# Patient Record
Sex: FEMALE | Race: WHITE | NOT HISPANIC OR LATINO | Employment: UNEMPLOYED | URBAN - METROPOLITAN AREA
[De-identification: names, ages, dates, MRNs, and addresses within clinical notes are randomized per-mention and may not be internally consistent; named-entity substitution may affect disease eponyms.]

---

## 2019-08-13 ENCOUNTER — HOSPITAL ENCOUNTER (EMERGENCY)
Facility: HOSPITAL | Age: 34
Discharge: HOME | End: 2019-08-13
Attending: EMERGENCY MEDICINE | Admitting: EMERGENCY MEDICINE
Payer: COMMERCIAL

## 2019-08-13 ENCOUNTER — APPOINTMENT (EMERGENCY)
Dept: RADIOLOGY | Facility: HOSPITAL | Age: 34
End: 2019-08-13
Payer: COMMERCIAL

## 2019-08-13 VITALS
DIASTOLIC BLOOD PRESSURE: 85 MMHG | HEART RATE: 88 BPM | HEIGHT: 64 IN | SYSTOLIC BLOOD PRESSURE: 125 MMHG | OXYGEN SATURATION: 99 % | WEIGHT: 250 LBS | BODY MASS INDEX: 42.68 KG/M2 | RESPIRATION RATE: 16 BRPM | TEMPERATURE: 96.8 F

## 2019-08-13 DIAGNOSIS — M77.8 LEFT WRIST TENDINITIS: Primary | ICD-10-CM

## 2019-08-13 PROCEDURE — 99283 EMERGENCY DEPT VISIT LOW MDM: CPT

## 2019-08-13 PROCEDURE — 73110 X-RAY EXAM OF WRIST: CPT

## 2019-08-13 RX ORDER — TOPIRAMATE 50 MG/1
TABLET, FILM COATED ORAL
COMMUNITY
Start: 2019-05-10 | End: 2020-06-11 | Stop reason: SDUPTHER

## 2019-08-13 RX ORDER — MONTELUKAST SODIUM 10 MG/1
10 TABLET ORAL
COMMUNITY
Start: 2019-05-10 | End: 2020-06-11 | Stop reason: SDUPTHER

## 2019-08-13 RX ORDER — FLUOXETINE HYDROCHLORIDE 40 MG/1
CAPSULE ORAL
COMMUNITY
Start: 2018-06-07 | End: 2020-06-11 | Stop reason: ALTCHOICE

## 2019-08-13 RX ORDER — IBUPROFEN 600 MG/1
600 TABLET ORAL ONCE
Status: COMPLETED | OUTPATIENT
Start: 2019-08-13 | End: 2019-08-13

## 2019-08-13 RX ORDER — IBUPROFEN 600 MG/1
600 TABLET ORAL EVERY 6 HOURS PRN
Qty: 30 TABLET | Refills: 0 | Status: SHIPPED | OUTPATIENT
Start: 2019-08-13 | End: 2020-03-03 | Stop reason: ALTCHOICE

## 2019-08-13 RX ORDER — ALBUTEROL SULFATE 90 UG/1
2 AEROSOL, METERED RESPIRATORY (INHALATION)
COMMUNITY
Start: 2019-05-10 | End: 2020-06-11 | Stop reason: SDUPTHER

## 2019-08-13 RX ADMIN — IBUPROFEN 600 MG: 600 TABLET ORAL at 14:32

## 2019-08-13 NOTE — ED PROVIDER NOTES
History  Chief Complaint   Patient presents with    Wrist Pain     pain and swelling in the left wrist since Saturday, took tylenol and aleve and advil no relief      Patient is a 80-year-old woman with no medical history says on Saturday she developed pain in her left wrist   Patient denies any overuse injury or direct trauma  She has no fevers or chills  She says the wrist is not warm  She did not break the skin  She says she has pain with flexion and extension  Cannot display prior to admission medications because the patient has not been admitted in this contact  Past Medical History:   Diagnosis Date    Asthma        Past Surgical History:   Procedure Laterality Date    CHOLECYSTECTOMY      TONSILLECTOMY      TUBAL LIGATION         History reviewed  No pertinent family history  I have reviewed and agree with the history as documented  Social History     Tobacco Use    Smoking status: Current Every Day Smoker     Packs/day: 0 50    Smokeless tobacco: Never Used   Substance Use Topics    Alcohol use: Never     Frequency: Never    Drug use: Not Currently        Review of Systems   Constitutional: Negative for chills and fever  HENT: Negative for rhinorrhea and sore throat  Eyes: Negative for visual disturbance  Respiratory: Negative for cough and shortness of breath  Cardiovascular: Negative for chest pain and leg swelling  Gastrointestinal: Negative for abdominal pain, diarrhea, nausea and vomiting  Genitourinary: Negative for dysuria  Musculoskeletal: Negative for back pain and myalgias  Skin: Negative for rash  Neurological: Negative for dizziness and headaches  Psychiatric/Behavioral: Negative for confusion  All other systems reviewed and are negative  Physical Exam  Physical Exam   Constitutional: She is oriented to person, place, and time  She appears well-developed and well-nourished  HENT:   Head: Normocephalic and atraumatic     Nose: Nose normal    Eyes: Pupils are equal, round, and reactive to light  EOM are normal    Neck: Normal range of motion  Neck supple  Cardiovascular: Normal rate, regular rhythm and normal heart sounds  Exam reveals no gallop and no friction rub  No murmur heard  Pulmonary/Chest: Effort normal and breath sounds normal  No respiratory distress  She has no wheezes  She has no rales  Abdominal: Soft  She exhibits no distension  There is no tenderness  There is no rebound and no guarding  Musculoskeletal: She exhibits no edema  Patient's left wrist is not warm and there is no effusion  The appearance is normal   Patient has pain with both flexion and extension as well as inverting an everting her wrist   She has no pain with moving her fingers or her elbow  There is no bony point tenderness  Neurological: She is alert and oriented to person, place, and time  Skin: Skin is warm and dry  Psychiatric: She has a normal mood and affect  Her behavior is normal  Judgment and thought content normal    Nursing note and vitals reviewed        Vital Signs  ED Triage Vitals [08/13/19 1402]   Temperature Pulse Respirations Blood Pressure SpO2   (!) 96 8 °F (36 °C) 88 16 163/95 97 %      Temp Source Heart Rate Source Patient Position - Orthostatic VS BP Location FiO2 (%)   Temporal Monitor Sitting Right arm --      Pain Score       Worst Possible Pain           Vitals:    08/13/19 1402   BP: 163/95   Pulse: 88   Patient Position - Orthostatic VS: Sitting         Visual Acuity      ED Medications  Medications - No data to display    Diagnostic Studies  Results Reviewed     None                 No orders to display              Procedures  Procedures       ED Course  ED Course as of Aug 13 1527   Tue Aug 13, 2019   1525 XR wrist 3+ views LEFT                               MDM    Disposition  Final diagnoses:   None     ED Disposition     None      Follow-up Information    None         Patient's Medications   Discharge Prescriptions    No medications on file     No discharge procedures on file      ED Provider  Electronically Signed by           Jaskaran Montgomery MD  08/13/19 26 [id number]

## 2019-10-30 ENCOUNTER — HOSPITAL ENCOUNTER (EMERGENCY)
Facility: HOSPITAL | Age: 34
Discharge: HOME | End: 2019-10-30
Attending: EMERGENCY MEDICINE
Payer: COMMERCIAL

## 2019-10-30 VITALS
SYSTOLIC BLOOD PRESSURE: 115 MMHG | BODY MASS INDEX: 46.71 KG/M2 | WEIGHT: 273.59 LBS | HEIGHT: 64 IN | OXYGEN SATURATION: 94 % | DIASTOLIC BLOOD PRESSURE: 61 MMHG | HEART RATE: 80 BPM | TEMPERATURE: 97.6 F | RESPIRATION RATE: 12 BRPM

## 2019-10-30 DIAGNOSIS — R51.9 HEADACHE: Primary | ICD-10-CM

## 2019-10-30 PROCEDURE — 96361 HYDRATE IV INFUSION ADD-ON: CPT

## 2019-10-30 PROCEDURE — 96376 TX/PRO/DX INJ SAME DRUG ADON: CPT

## 2019-10-30 PROCEDURE — 96375 TX/PRO/DX INJ NEW DRUG ADDON: CPT

## 2019-10-30 PROCEDURE — 96365 THER/PROPH/DIAG IV INF INIT: CPT

## 2019-10-30 PROCEDURE — 93005 ELECTROCARDIOGRAM TRACING: CPT

## 2019-10-30 PROCEDURE — 99284 EMERGENCY DEPT VISIT MOD MDM: CPT

## 2019-10-30 PROCEDURE — 99284 EMERGENCY DEPT VISIT MOD MDM: CPT | Performed by: EMERGENCY MEDICINE

## 2019-10-30 RX ORDER — CYCLOBENZAPRINE HCL 10 MG
10 TABLET ORAL ONCE
Status: COMPLETED | OUTPATIENT
Start: 2019-10-30 | End: 2019-10-30

## 2019-10-30 RX ORDER — CYCLOBENZAPRINE HCL 10 MG
10 TABLET ORAL 3 TIMES DAILY
Qty: 20 TABLET | Refills: 0 | Status: SHIPPED | OUTPATIENT
Start: 2019-10-30 | End: 2020-06-11 | Stop reason: ALTCHOICE

## 2019-10-30 RX ORDER — METOCLOPRAMIDE HYDROCHLORIDE 5 MG/ML
10 INJECTION INTRAMUSCULAR; INTRAVENOUS ONCE
Status: COMPLETED | OUTPATIENT
Start: 2019-10-30 | End: 2019-10-30

## 2019-10-30 RX ORDER — MAGNESIUM SULFATE HEPTAHYDRATE 40 MG/ML
2 INJECTION, SOLUTION INTRAVENOUS ONCE
Status: COMPLETED | OUTPATIENT
Start: 2019-10-30 | End: 2019-10-30

## 2019-10-30 RX ORDER — DEXAMETHASONE SODIUM PHOSPHATE 10 MG/ML
10 INJECTION, SOLUTION INTRAMUSCULAR; INTRAVENOUS ONCE
Status: COMPLETED | OUTPATIENT
Start: 2019-10-30 | End: 2019-10-30

## 2019-10-30 RX ORDER — KETOROLAC TROMETHAMINE 30 MG/ML
30 INJECTION, SOLUTION INTRAMUSCULAR; INTRAVENOUS ONCE
Status: COMPLETED | OUTPATIENT
Start: 2019-10-30 | End: 2019-10-30

## 2019-10-30 RX ORDER — DIPHENHYDRAMINE HYDROCHLORIDE 50 MG/ML
25 INJECTION INTRAMUSCULAR; INTRAVENOUS ONCE
Status: COMPLETED | OUTPATIENT
Start: 2019-10-30 | End: 2019-10-30

## 2019-10-30 RX ADMIN — KETOROLAC TROMETHAMINE 30 MG: 30 INJECTION, SOLUTION INTRAMUSCULAR at 19:13

## 2019-10-30 RX ADMIN — MAGNESIUM SULFATE HEPTAHYDRATE 2 G: 40 INJECTION, SOLUTION INTRAVENOUS at 19:24

## 2019-10-30 RX ADMIN — METOCLOPRAMIDE 10 MG: 5 INJECTION, SOLUTION INTRAMUSCULAR; INTRAVENOUS at 19:15

## 2019-10-30 RX ADMIN — DIPHENHYDRAMINE HYDROCHLORIDE 25 MG: 50 INJECTION INTRAMUSCULAR; INTRAVENOUS at 19:14

## 2019-10-30 RX ADMIN — CYCLOBENZAPRINE HYDROCHLORIDE 10 MG: 10 TABLET, FILM COATED ORAL at 21:44

## 2019-10-30 RX ADMIN — DEXAMETHASONE SODIUM PHOSPHATE 10 MG: 10 INJECTION, SOLUTION INTRAMUSCULAR; INTRAVENOUS at 19:14

## 2019-10-30 RX ADMIN — METOCLOPRAMIDE 10 MG: 5 INJECTION, SOLUTION INTRAMUSCULAR; INTRAVENOUS at 21:43

## 2019-10-30 RX ADMIN — SODIUM CHLORIDE 1000 ML: 0.9 INJECTION, SOLUTION INTRAVENOUS at 19:13

## 2019-10-30 NOTE — ED PROVIDER NOTES
EMERGENCY DEPARTMENT ENCOUNTER NOTE  ? CHIEF COMPLAINT  Chief Complaint   Patient presents with    Headache     Pt reports headache for about a week  Pt reports nausea and vomiting  HPI  Eugenia Kaufman is a 36 y o  female with PMH of asthma, prior migraine headaches, presenting with a posterior headache over the past 3 days  Headache onset was gradual   It feels like a neck muscle spasm in addition to the headache  There is photosensitivity and phono sensitivity  There is nausea and some emesis  No fevers  Patient's migraines are usually frontal, but this headache is in the back  Patient tried taking Excedrin at home, but this did not help  Headache is associated with anterior chest pain  Chest pain is nonradiating  It is not worsened with activity  It is not worsened with breathing in or out  Patient has not had any cough  There is no shortness of breath  Denies recent head trauma  REVIEW OF SYSTEMS  Constitutional:  No fevers  Eyes:  Has known strabismus, reports having some ongoing issues with her vision  ENT: Denies earache or sore throat  CV: Denies chest pain   Resp: Denies shortness of breath or coughing  GI: ?Denies abdominal pain, vomiting, or diarrhea   Skin: No new rashes  Neuro:  History of migraines  Ten systems were reviewed otherwise were unremarkable    PAST MEDICAL HISTORY  Past Medical History:   Diagnosis Date    Asthma        SURGICAL HISTORY  Past Surgical History:   Procedure Laterality Date    CHOLECYSTECTOMY      TONSILLECTOMY      TUBAL LIGATION         FAMILY HISTORY  History reviewed  No pertinent family history  CURRENT MEDICATIONS  No current facility-administered medications on file prior to encounter        Current Outpatient Medications on File Prior to Encounter   Medication Sig    albuterol (VENTOLIN HFA) 90 mcg/act inhaler Inhale 2 puffs    FLUoxetine (PROzac) 40 MG capsule TAKE 1 CAPSULE BY MOUTH ONCE DAILY    ibuprofen (MOTRIN) 600 mg tablet Take 1 tablet (600 mg total) by mouth every 6 (six) hours as needed for mild pain    montelukast (SINGULAIR) 10 mg tablet Take 10 mg by mouth    topiramate (TOPAMAX) 50 MG tablet Take 1/2 tablet twice daily for 1 week, then 1/2 tab in morning and 1 tab at night for 1 week, then 1 tab twice a day       ALLERGIES  No Known Allergies    SOCIAL HISTORY  Social History     Socioeconomic History    Marital status: Legally      Spouse name: None    Number of children: None    Years of education: None    Highest education level: None   Occupational History    None   Social Needs    Financial resource strain: None    Food insecurity:     Worry: None     Inability: None    Transportation needs:     Medical: None     Non-medical: None   Tobacco Use    Smoking status: Current Every Day Smoker     Packs/day: 0 50     Types: Cigarettes    Smokeless tobacco: Never Used   Substance and Sexual Activity    Alcohol use: Never     Frequency: Never    Drug use: Not Currently    Sexual activity: None   Lifestyle    Physical activity:     Days per week: None     Minutes per session: None    Stress: None   Relationships    Social connections:     Talks on phone: None     Gets together: None     Attends Jew service: None     Active member of club or organization: None     Attends meetings of clubs or organizations: None     Relationship status: None    Intimate partner violence:     Fear of current or ex partner: None     Emotionally abused: None     Physically abused: None     Forced sexual activity: None   Other Topics Concern    None   Social History Narrative    None       PHYSICAL EXAM    /92 (BP Location: Left arm)   Pulse 88   Temp 97 6 °F (36 4 °C) (Temporal)   Resp 16   Ht 5' 4" (1 626 m)   Wt 124 kg (273 lb 9 5 oz)   LMP 10/13/2019   SpO2 97%   BMI 46 96 kg/m²   Vital signs and nursing notes reviewed  CONSTITUTIONAL: female appearing stated age resting in bed, in no acute distress  HEENT: atraumatic, normocephalic  Sclera anicteric, conjunctiva are not injected  Moist oral mucosa  CARDIOVASCULAR/CHEST: RRR, no M/R/G  2+ radial pulses  PULMONARY: Breathing comfortably on RA  Breath sounds are equal and clear to auscultation  ABDOMEN: non-distended  MSK:  No cervical spine tenderness, there is no significant paraspinal cervical muscle spasm noted  No meningismus  moves all extremities, no deformities, no peripheral edema  NEURO: Awake, alert, and oriented x 3  Pupils equal round reactive to light  Extraocular movements are intact with a caveat that patient has a left eye strabismus  Face symmetric  Overload elevates midline, tongue protrudes midline  5/5 strength in bilateral upper and lower extremities  No pronator drift  Normal finger-to-nose, normal heel-to-shin  Patient able to ambulate with a narrow based gait without difficulty  SKIN: Warm, appears well-perfused  MENTAL STATUS: Normal affect  ? ED COURSE & MEDICAL DECISION MAKING  ECG 12 Lead Documentation Only  Date/Time: 10/30/2019 7:07 PM  Performed by: Shea Suárez MD  Authorized by: Shea Suárez MD     Comments:      EKG:  Normal sinus rhythm, ventricular rate 72, ME interval 174, , , normal axis, no ST/T-wave changes to suggest ischemia, no STEMI        Medications   sodium chloride 0 9 % bolus 1,000 mL (0 mL Intravenous Stopped 10/30/19 2143)   diphenhydrAMINE (BENADRYL) injection 25 mg (25 mg Intravenous Given 10/30/19 1914)   metoclopramide (REGLAN) injection 10 mg (10 mg Intravenous Given 10/30/19 1915)   ketorolac (TORADOL) injection 30 mg (30 mg Intravenous Given 10/30/19 1913)   magnesium sulfate 2 g/50 mL IVPB (premix) 2 g (0 g Intravenous Stopped 10/30/19 2036)   dexamethasone (PF) (DECADRON) injection 10 mg (10 mg Intravenous Given 10/30/19 1914)   metoclopramide (REGLAN) injection 10 mg (10 mg Intravenous Given 10/30/19 2143)   cyclobenzaprine (FLEXERIL) tablet 10 mg (10 mg Oral Given 10/30/19 244)     56-year-old female presenting with posterior headache and muscle spasm  Vital signs reviewed, afebrile, mildly hypertensive  Differential diagnosis includes primary cause of headache, such as tension headache, migraine headache, cluster headache, with other etiologies including cervical strain with associated muscle spasm  There are no red flags such as head trauma, fevers, altered mental status, neurologic deficits, to point to other, more sinister etiologies of headache, such as CNS infection, subarachnoid hemorrhage  Patient is also presenting with anterior chest pain  Differential diagnosis includes musculoskeletal pain, acute coronary syndrome, pericarditis, versus another etiology of symptoms  EKG obtained, as read by me, as above, no ischemic changes  Will treat patient symptomatically with Toradol  IV access established, 1 L normal saline, Reglan, Benadryl, Toradol, and magnesium sulfate administered for headache  On re-evaluation, patient reports that pain is improved down to 7/10  Reglan and Flexeril administered  Decadron administered to minimize chance of rebound headache  On re-evaluation, patient reports the headache is now down to 2/10  Chest discomfort is improved  I will send a prescription for Flexeril to patient's preferred pharmacy, as I suspect that there is an element of cervical muscle spasm contributing to patient's symptoms  Patient is to continue with Excedrin  Follow up with primary care physician  Return to emergency department with new or worsening symptoms           MDM  Number of Diagnoses or Management Options  Headache: new and does not require workup     Amount and/or Complexity of Data Reviewed  Review and summarize past medical records: yes    Risk of Complications, Morbidity, and/or Mortality  Presenting problems: moderate  Diagnostic procedures: low  Management options: high    Patient Progress  Patient progress: improved      CLINICAL IMPRESSION  Final diagnoses:   Headache       DISPOSITION  Time reflects when diagnosis was documented in both MDM as applicable and the Disposition within this note     Time User Action Codes Description Comment    10/30/2019 10:33 PM An Oro Add [R51] Headache       ED Disposition     ED Disposition Condition Date/Time Comment    Discharge Stable Wed Oct 30, 2019 10:33 PM William Heena discharge to home/self care  Follow-up Information     Follow up With Specialties Details Why Contact Info Additional Information    Naheed Grover MD [facility] Schedule an appointment as soon as possible for a visit in 1 week As needed [address] [place] [address] Emergency Department Emergency Medicine Go to  As needed, If symptoms worsen [place] [id number] [phone] [place] ED, [address], [address]          This note has been generated using a voice recognition software  There may be typographic, grammatic, or word substitution errors that have escaped editorial review          Deanne Mclean MD  10/31/19 1295

## 2019-10-31 LAB
ATRIAL RATE: 72 BPM
P AXIS: 53 DEGREES
PR INTERVAL: 174 MS
QRS AXIS: 51 DEGREES
QRSD INTERVAL: 102 MS
QT INTERVAL: 414 MS
QTC INTERVAL: 453 MS
T WAVE AXIS: 54 DEGREES
VENTRICULAR RATE: 72 BPM

## 2019-10-31 PROCEDURE — 93010 ELECTROCARDIOGRAM REPORT: CPT | Performed by: INTERNAL MEDICINE

## 2020-03-02 ENCOUNTER — APPOINTMENT (EMERGENCY)
Dept: ULTRASOUND IMAGING | Facility: HOSPITAL | Age: 35
End: 2020-03-02
Payer: COMMERCIAL

## 2020-03-02 ENCOUNTER — HOSPITAL ENCOUNTER (EMERGENCY)
Facility: HOSPITAL | Age: 35
Discharge: HOME | End: 2020-03-03
Attending: EMERGENCY MEDICINE
Payer: COMMERCIAL

## 2020-03-02 ENCOUNTER — APPOINTMENT (EMERGENCY)
Dept: RADIOLOGY | Facility: HOSPITAL | Age: 35
End: 2020-03-02
Payer: COMMERCIAL

## 2020-03-02 ENCOUNTER — APPOINTMENT (EMERGENCY)
Dept: CT IMAGING | Facility: HOSPITAL | Age: 35
End: 2020-03-02
Payer: COMMERCIAL

## 2020-03-02 DIAGNOSIS — B34.9 VIRAL SYNDROME: Primary | ICD-10-CM

## 2020-03-02 DIAGNOSIS — G43.909 MIGRAINE: ICD-10-CM

## 2020-03-02 LAB
ALBUMIN SERPL BCP-MCNC: 3.5 G/DL (ref 3.5–5)
ALP SERPL-CCNC: 78 U/L (ref 46–116)
ALT SERPL W P-5'-P-CCNC: 41 U/L (ref 12–78)
ANION GAP SERPL CALCULATED.3IONS-SCNC: 9 MMOL/L (ref 4–13)
AST SERPL W P-5'-P-CCNC: 21 U/L (ref 5–45)
BACTERIA UR QL AUTO: NORMAL /HPF
BASOPHILS # BLD AUTO: 0.04 THOUSANDS/ΜL (ref 0–0.1)
BASOPHILS NFR BLD AUTO: 0 % (ref 0–1)
BILIRUB SERPL-MCNC: 0.2 MG/DL (ref 0.2–1)
BILIRUB UR QL STRIP: NEGATIVE
BUN SERPL-MCNC: 8 MG/DL (ref 5–25)
CALCIUM SERPL-MCNC: 9.2 MG/DL (ref 8.3–10.1)
CHLORIDE SERPL-SCNC: 104 MMOL/L (ref 100–108)
CLARITY UR: CLEAR
CO2 SERPL-SCNC: 27 MMOL/L (ref 21–32)
COLOR UR: YELLOW
CREAT SERPL-MCNC: 0.64 MG/DL (ref 0.6–1.3)
EOSINOPHIL # BLD AUTO: 0.33 THOUSAND/ΜL (ref 0–0.61)
EOSINOPHIL NFR BLD AUTO: 3 % (ref 0–6)
ERYTHROCYTE [DISTWIDTH] IN BLOOD BY AUTOMATED COUNT: 13.2 % (ref 11.6–15.1)
FLUAV RNA NPH QL NAA+PROBE: NORMAL
FLUBV RNA NPH QL NAA+PROBE: NORMAL
GFR SERPL CREATININE-BSD FRML MDRD: 118 ML/MIN/1.73SQ M
GLUCOSE SERPL-MCNC: 100 MG/DL (ref 65–140)
GLUCOSE UR STRIP-MCNC: NEGATIVE MG/DL
HCT VFR BLD AUTO: 42.4 % (ref 34.8–46.1)
HGB BLD-MCNC: 14.1 G/DL (ref 11.5–15.4)
HGB UR QL STRIP.AUTO: ABNORMAL
IMM GRANULOCYTES # BLD AUTO: 0.09 THOUSAND/UL (ref 0–0.2)
IMM GRANULOCYTES NFR BLD AUTO: 1 % (ref 0–2)
KETONES UR STRIP-MCNC: NEGATIVE MG/DL
LEUKOCYTE ESTERASE UR QL STRIP: NEGATIVE
LYMPHOCYTES # BLD AUTO: 2.9 THOUSANDS/ΜL (ref 0.6–4.47)
LYMPHOCYTES NFR BLD AUTO: 30 % (ref 14–44)
MAGNESIUM SERPL-MCNC: 1.9 MG/DL (ref 1.6–2.6)
MCH RBC QN AUTO: 29 PG (ref 26.8–34.3)
MCHC RBC AUTO-ENTMCNC: 33.3 G/DL (ref 31.4–37.4)
MCV RBC AUTO: 87 FL (ref 82–98)
MONOCYTES # BLD AUTO: 0.49 THOUSAND/ΜL (ref 0.17–1.22)
MONOCYTES NFR BLD AUTO: 5 % (ref 4–12)
NEUTROPHILS # BLD AUTO: 5.81 THOUSANDS/ΜL (ref 1.85–7.62)
NEUTS SEG NFR BLD AUTO: 61 % (ref 43–75)
NITRITE UR QL STRIP: NEGATIVE
NON-SQ EPI CELLS URNS QL MICRO: NORMAL /HPF
NRBC BLD AUTO-RTO: 0 /100 WBCS
PH UR STRIP.AUTO: 6 [PH]
PLATELET # BLD AUTO: 261 THOUSANDS/UL (ref 149–390)
PMV BLD AUTO: 9.2 FL (ref 8.9–12.7)
POTASSIUM SERPL-SCNC: 3.4 MMOL/L (ref 3.5–5.3)
PROT SERPL-MCNC: 7.6 G/DL (ref 6.4–8.2)
PROT UR STRIP-MCNC: NEGATIVE MG/DL
RBC # BLD AUTO: 4.87 MILLION/UL (ref 3.81–5.12)
RBC #/AREA URNS AUTO: NORMAL /HPF
RSV RNA NPH QL NAA+PROBE: NORMAL
SODIUM SERPL-SCNC: 140 MMOL/L (ref 136–145)
SP GR UR STRIP.AUTO: 1.01 (ref 1–1.03)
UROBILINOGEN UR QL STRIP.AUTO: 0.2 E.U./DL
WBC # BLD AUTO: 9.66 THOUSAND/UL (ref 4.31–10.16)
WBC #/AREA URNS AUTO: NORMAL /HPF

## 2020-03-02 PROCEDURE — 96365 THER/PROPH/DIAG IV INF INIT: CPT

## 2020-03-02 PROCEDURE — 76770 US EXAM ABDO BACK WALL COMP: CPT

## 2020-03-02 PROCEDURE — 74176 CT ABD & PELVIS W/O CONTRAST: CPT

## 2020-03-02 PROCEDURE — 83735 ASSAY OF MAGNESIUM: CPT | Performed by: EMERGENCY MEDICINE

## 2020-03-02 PROCEDURE — 96375 TX/PRO/DX INJ NEW DRUG ADDON: CPT

## 2020-03-02 PROCEDURE — 81001 URINALYSIS AUTO W/SCOPE: CPT | Performed by: EMERGENCY MEDICINE

## 2020-03-02 PROCEDURE — 99284 EMERGENCY DEPT VISIT MOD MDM: CPT | Performed by: EMERGENCY MEDICINE

## 2020-03-02 PROCEDURE — 99284 EMERGENCY DEPT VISIT MOD MDM: CPT

## 2020-03-02 PROCEDURE — 71046 X-RAY EXAM CHEST 2 VIEWS: CPT

## 2020-03-02 PROCEDURE — 36415 COLL VENOUS BLD VENIPUNCTURE: CPT | Performed by: EMERGENCY MEDICINE

## 2020-03-02 PROCEDURE — 85025 COMPLETE CBC W/AUTO DIFF WBC: CPT | Performed by: EMERGENCY MEDICINE

## 2020-03-02 PROCEDURE — 87631 RESP VIRUS 3-5 TARGETS: CPT | Performed by: EMERGENCY MEDICINE

## 2020-03-02 PROCEDURE — 80053 COMPREHEN METABOLIC PANEL: CPT | Performed by: EMERGENCY MEDICINE

## 2020-03-02 RX ORDER — MAGNESIUM SULFATE HEPTAHYDRATE 40 MG/ML
2 INJECTION, SOLUTION INTRAVENOUS ONCE
Status: COMPLETED | OUTPATIENT
Start: 2020-03-02 | End: 2020-03-02

## 2020-03-02 RX ORDER — KETOROLAC TROMETHAMINE 30 MG/ML
30 INJECTION, SOLUTION INTRAMUSCULAR; INTRAVENOUS ONCE
Status: COMPLETED | OUTPATIENT
Start: 2020-03-02 | End: 2020-03-02

## 2020-03-02 RX ORDER — DIPHENHYDRAMINE HYDROCHLORIDE 50 MG/ML
25 INJECTION INTRAMUSCULAR; INTRAVENOUS ONCE
Status: COMPLETED | OUTPATIENT
Start: 2020-03-02 | End: 2020-03-02

## 2020-03-02 RX ORDER — METOCLOPRAMIDE HYDROCHLORIDE 5 MG/ML
10 INJECTION INTRAMUSCULAR; INTRAVENOUS ONCE
Status: COMPLETED | OUTPATIENT
Start: 2020-03-02 | End: 2020-03-02

## 2020-03-02 RX ADMIN — KETOROLAC TROMETHAMINE 30 MG: 30 INJECTION, SOLUTION INTRAMUSCULAR at 22:42

## 2020-03-02 RX ADMIN — METOCLOPRAMIDE HYDROCHLORIDE 10 MG: 5 INJECTION INTRAMUSCULAR; INTRAVENOUS at 22:42

## 2020-03-02 RX ADMIN — MAGNESIUM SULFATE HEPTAHYDRATE 2 G: 40 INJECTION, SOLUTION INTRAVENOUS at 22:38

## 2020-03-02 RX ADMIN — SODIUM CHLORIDE 1000 ML: 0.9 INJECTION, SOLUTION INTRAVENOUS at 22:17

## 2020-03-02 RX ADMIN — DIPHENHYDRAMINE HYDROCHLORIDE 25 MG: 50 INJECTION INTRAMUSCULAR; INTRAVENOUS at 22:41

## 2020-03-03 VITALS
RESPIRATION RATE: 18 BRPM | HEART RATE: 98 BPM | WEIGHT: 260 LBS | BODY MASS INDEX: 44.63 KG/M2 | TEMPERATURE: 97.8 F | DIASTOLIC BLOOD PRESSURE: 76 MMHG | OXYGEN SATURATION: 94 % | SYSTOLIC BLOOD PRESSURE: 143 MMHG

## 2020-03-03 RX ORDER — DIPHENHYDRAMINE HCL 25 MG
CAPSULE ORAL
Qty: 20 CAPSULE | Refills: 0 | Status: SHIPPED | OUTPATIENT
Start: 2020-03-03 | End: 2020-10-27

## 2020-03-03 RX ORDER — METOCLOPRAMIDE 10 MG/1
10 TABLET ORAL EVERY 6 HOURS
Qty: 30 TABLET | Refills: 0 | Status: SHIPPED | OUTPATIENT
Start: 2020-03-03 | End: 2020-06-11 | Stop reason: ALTCHOICE

## 2020-03-03 RX ORDER — ONDANSETRON 4 MG/1
4 TABLET, ORALLY DISINTEGRATING ORAL EVERY 8 HOURS PRN
Qty: 12 TABLET | Refills: 0 | Status: SHIPPED | OUTPATIENT
Start: 2020-03-03 | End: 2020-06-11 | Stop reason: ALTCHOICE

## 2020-03-03 RX ORDER — IBUPROFEN 600 MG/1
600 TABLET ORAL EVERY 6 HOURS PRN
Qty: 30 TABLET | Refills: 0 | Status: SHIPPED | OUTPATIENT
Start: 2020-03-03 | End: 2020-06-11 | Stop reason: ALTCHOICE

## 2020-03-03 NOTE — ED NOTES
IV site assessed  Fluids and magnesium running  No complaints at this time        Claudis Moritz, RN  03/02/20 6422

## 2020-03-03 NOTE — ED PROVIDER NOTES
History  Chief Complaint   Patient presents with    URI     uri symptoms, cough, headache, sinus congestion, also rt lower back pain     Patient: Rishabh Betancur  34 y o /female  YOB: 1985  MRN: [id number]  PCP: Fausto See MD  Date of evaluation: 3/2/2020    (N B   [address] may have been used in the preparation of this document  Occasional wrong word or "sound-alike" substitutions may have occurred due to the inherent limitations of voice recognition software  Interpretation should be guided by context )    History provided by:  Patient  Flu Symptoms   Presenting symptoms: cough and headache    Presenting symptoms: no diarrhea, no fever, no nausea, no shortness of breath and no vomiting    Presenting symptoms comment:  Sinus congestion  Onset quality:  Gradual  Chronicity:  New  Relieved by:  Nothing  Associated symptoms: no chills, no mental status change and no neck stiffness        Prior to Admission Medications   Prescriptions Last Dose Informant Patient Reported? Taking? FLUoxetine (PROzac) 40 MG capsule Not Taking at Unknown time  Yes No   Sig: TAKE 1 CAPSULE BY MOUTH ONCE DAILY   albuterol (VENTOLIN HFA) 90 mcg/act inhaler Not Taking at Unknown time  Yes No   Sig: Inhale 2 puffs   cyclobenzaprine (FLEXERIL) 10 mg tablet   No No   Sig: Take 1 tablet (10 mg total) by mouth 3 (three) times a day for 7 days   montelukast (SINGULAIR) 10 mg tablet Not Taking at Unknown time  Yes No   Sig: Take 10 mg by mouth   topiramate (TOPAMAX) 50 MG tablet Not Taking at Unknown time  Yes No   Sig: Take 1/2 tablet twice daily for 1 week, then 1/2 tab in morning and 1 tab at night for 1 week, then 1 tab twice a day      Facility-Administered Medications: None       Past Medical History:   Diagnosis Date    Asthma        Past Surgical History:   Procedure Laterality Date    CHOLECYSTECTOMY      TONSILLECTOMY      TUBAL LIGATION         History reviewed   No pertinent family history  I have reviewed and agree with the history as documented  E-Cigarette/Vaping     E-Cigarette/Vaping Substances     Social History     Tobacco Use    Smoking status: Current Every Day Smoker     Packs/day: 0 50     Types: Cigarettes    Smokeless tobacco: Never Used   Substance Use Topics    Alcohol use: Never     Frequency: Never    Drug use: Not Currently       Review of Systems   Constitutional: Negative for chills and fever  HENT: Negative for hearing loss, trouble swallowing and voice change  Eyes: Negative for pain, redness and visual disturbance  Respiratory: Positive for cough  Negative for shortness of breath  Cardiovascular: Negative for chest pain and palpitations  Gastrointestinal: Negative for abdominal pain, constipation, diarrhea, nausea and vomiting  Genitourinary: Negative for dysuria, hematuria, vaginal bleeding and vaginal discharge  Musculoskeletal: Negative for back pain, gait problem, neck pain and neck stiffness  Skin: Negative for color change and rash  Neurological: Positive for headaches  Negative for weakness and light-headedness  Psychiatric/Behavioral: Negative for confusion and decreased concentration  The patient is not nervous/anxious  All other systems reviewed and are negative  Physical Exam  Physical Exam   Constitutional: She is oriented to person, place, and time  She appears well-developed and well-nourished  HENT:   Mouth/Throat: Oropharynx is clear and moist and mucous membranes are normal    Voice normal   Eyes: Pupils are equal, round, and reactive to light  EOM are normal    Cardiovascular: Normal rate and regular rhythm  Pulmonary/Chest: Effort normal    Abdominal: Soft  Bowel sounds are normal    Neurological: She is alert and oriented to person, place, and time  GCS eye subscore is 4  GCS verbal subscore is 5  GCS motor subscore is 6  Skin: Skin is warm and dry  Psychiatric: She has a normal mood and affect   Her speech is normal and behavior is normal    Nursing note and vitals reviewed        Vital Signs  ED Triage Vitals [03/02/20 2110]   Temperature Pulse Respirations Blood Pressure SpO2   97 8 °F (36 6 °C) 100 18 140/83 97 %      Temp Source Heart Rate Source Patient Position - Orthostatic VS BP Location FiO2 (%)   Temporal Monitor Sitting Right arm --      Pain Score       8           Vitals:    03/02/20 2245 03/02/20 2300 03/02/20 2330 03/03/20 0000   BP: 127/75 147/80 141/89 143/76   Pulse: 93 95 94 98   Patient Position - Orthostatic VS: Sitting Sitting Sitting Sitting         Visual Acuity  Visual Acuity      Most Recent Value   L Pupil Size (mm)  3   R Pupil Size (mm)  3          ED Medications  Medications   sodium chloride 0 9 % bolus 1,000 mL (0 mL Intravenous Stopped 3/2/20 2331)   diphenhydrAMINE (BENADRYL) injection 25 mg (25 mg Intravenous Given 3/2/20 2241)   metoclopramide (REGLAN) injection 10 mg (10 mg Intravenous Given 3/2/20 2242)   ketorolac (TORADOL) injection 30 mg (30 mg Intravenous Given 3/2/20 2242)   magnesium sulfate 2 g/50 mL IVPB (premix) 2 g (0 g Intravenous Stopped 3/2/20 2335)       Diagnostic Studies  Results Reviewed     Procedure Component Value Units Date/Time    Urine Microscopic [[id number]]  (Normal) Collected:  03/02/20 2218    Lab Status:  Final result Specimen:  Urine, Clean Catch Updated:  03/02/20 2244     RBC, UA None Seen /hpf      WBC, UA None Seen /hpf      Epithelial Cells Occasional /hpf      Bacteria, UA Occasional /hpf     Comprehensive metabolic panel [[id number]]  (Abnormal) Collected:  03/02/20 2218    Lab Status:  Final result Specimen:  Blood from Arm, Left Updated:  03/02/20 2241     Sodium 140 mmol/L      Potassium 3 4 mmol/L      Chloride 104 mmol/L      CO2 27 mmol/L      ANION GAP 9 mmol/L      BUN 8 mg/dL      Creatinine 0 64 mg/dL      Glucose 100 mg/dL      Calcium 9 2 mg/dL      AST 21 U/L      ALT 41 U/L      Alkaline Phosphatase 78 U/L      Total Protein 7 6 g/dL Albumin 3 5 g/dL      Total Bilirubin 0 20 mg/dL      eGFR 118 ml/min/1 73sq m     Narrative:       [place] guidelines for Chronic Kidney Disease (CKD):     Stage 1 with normal or high GFR (GFR > 90 mL/min/1 73 square meters)    Stage 2 Mild CKD (GFR = 60-89 mL/min/1 73 square meters)    Stage 3A Moderate CKD (GFR = 45-59 mL/min/1 73 square meters)    Stage 3B Moderate CKD (GFR = 30-44 mL/min/1 73 square meters)    Stage 4 Severe CKD (GFR = 15-29 mL/min/1 73 square meters)    Stage 5 End Stage CKD (GFR <15 mL/min/1 73 square meters)  Note: GFR calculation is accurate only with a steady state creatinine    Magnesium [[id number]]  (Normal) Collected:  03/02/20 2218    Lab Status:  Final result Specimen:  Blood from Arm, Left Updated:  03/02/20 2235     Magnesium 1 9 mg/dL     UA w Reflex to Microscopic w Reflex to Culture [[id number]]  (Abnormal) Collected:  03/02/20 2218    Lab Status:  Final result Specimen:  Urine, Clean Catch Updated:  03/02/20 2224     Color, UA Yellow     Clarity, UA Clear     Specific Gravity, UA 1 010     pH, UA 6 0     Leukocytes, UA Negative     Nitrite, UA Negative     Protein, UA Negative mg/dl      Glucose, UA Negative mg/dl      Ketones, UA Negative mg/dl      Urobilinogen, UA 0 2 E U /dl      Bilirubin, UA Negative     Blood, UA Trace-Intact    CBC and differential [[id number]] Collected:  03/02/20 2218    Lab Status:  Final result Specimen:  Blood from Arm, Left Updated:  03/02/20 2224     WBC 9 66 Thousand/uL      RBC 4 87 Million/uL      Hemoglobin 14 1 g/dL      Hematocrit 42 4 %      MCV 87 fL      MCH 29 0 pg      MCHC 33 3 g/dL      RDW 13 2 %      MPV 9 2 fL      Platelets 544 Thousands/uL      nRBC 0 /100 WBCs      Neutrophils Relative 61 %      Immat GRANS % 1 %      Lymphocytes Relative 30 %      Monocytes Relative 5 %      Eosinophils Relative 3 %      Basophils Relative 0 %      Neutrophils Absolute 5 81 Thousands/µL      Immature Grans Absolute 0 09 Thousand/uL      Lymphocytes Absolute 2 90 Thousands/µL      Monocytes Absolute 0 49 Thousand/µL      Eosinophils Absolute 0 33 Thousand/µL      Basophils Absolute 0 04 Thousands/µL     Influenza A/B and RSV PCR [[id number]]  (Normal) Collected:  03/02/20 2134    Lab Status:  Final result Specimen:  Nose Updated:  03/02/20 2223     INFLUENZA A PCR None Detected     INFLUENZA B PCR None Detected     RSV PCR None Detected                 CT renal stone study abdomen pelvis without contrast   Final Result by Agustín Nagel MD (03/03 0033)      No evidence of obstructive uropathy  No hydronephrosis  No evidence of bowel obstruction  Normal appendix  Workstation performed: [id number]         US kidney and bladder   Final Result by Nathan Martinez MD (03/02 2304)      Unremarkable kidneys given limitations  Minimally distended limited bladder evaluation, correlate with urinalysis  Workstation performed: [id number]         XR chest 2 views   ED Interpretation by Dilia Velasco MD (03/02 2314)   Diffuse interstitial prominence; no thing focal      Final Result by Yuan Mcadams MD (03/03 1772)      No acute cardiopulmonary disease              Workstation performed: [id number]                    Procedures  Procedures         ED Course  ED Course as of Mar 14 0424   Mon Mar 02, 2020   2237 Leukocytes, UA: Negative   2237 Nitrite, UA: Negative   2237 Blood, UA(!): Trace-Intact   2237 WBC: 9 66   2237 Hemoglobin: 14 1   2237 Platelet Count: 621   5880 INFLU A PCR: None Detected   2237 INFLU B PCR: None Detected   2237 RSV PCR: None Detected   2312 Sodium: 140   2312 Potassium(!): 3 4   2312 Anion Gap: 9   2312 eGFR: 118   2312 Leukocytes, UA: Negative   2312 Nitrite, UA: Negative   2312 Blood, UA(!): Trace-Intact   2312 Bacteria, UA: Occasional   2312 WBC, UA: None Seen   2312 RBC, UA: None Seen   2313 ULTRASOUND:Formal Radiologist reading includes the following:    Minimally distended limited bladder evaluation although no identified although no obvious intraluminal mass  Bilateral ureteral jets detected         IMPRESSION:     Unremarkable kidneys given limitations  Minimally distended limited bladder evaluation, correlate with urinalysis      Workstation performed: [id number]      5911 Awaiting response to migraine therapy  2353 Staff report pt's HA is down to 4/10  MDM  Number of Diagnoses or Management Options  Migraine:   Viral syndrome:      Amount and/or Complexity of Data Reviewed  Tests in the radiology section of CPT®: ordered and reviewed  Tests in the medicine section of CPT®: ordered and reviewed    Risk of Complications, Morbidity, and/or Mortality  Diagnostic procedures: high    Patient Progress  Patient progress: improved        Disposition  Final diagnoses:   Viral syndrome   Migraine     Time reflects when diagnosis was documented in both MDM as applicable and the Disposition within this note     Time User Action Codes Description Comment    3/3/2020  1:20 AM Diana GALO Add [B34 9] Viral syndrome     3/3/2020  1:20 AM Liset Benites Add [G43 909] Migraine       ED Disposition     ED Disposition Condition Date/Time Comment    Discharge Stable Tue Mar 3, 2020  1:20 AM [facility] discharge to home/self care              Follow-up Information     Follow up With Specialties Details Why [address] [id number] [facility]  [phone]            Discharge Medication List as of 3/3/2020  1:22 AM      START taking these medications    Details   diphenhydrAMINE (BENADRYL) 25 mg capsule Take one with each Reglan (metoclopramide), Normal      ibuprofen (MOTRIN) 600 mg tablet Take 1 tablet (600 mg total) by mouth every 6 (six) hours as needed (pain, fever (= 3 of the 200 mg OTC tablets)), Starting Tue 3/3/2020, Normal      metoclopramide (REGLAN) 10 mg tablet Take 1 tablet (10 mg total) by mouth every 6 (six) hours Take with a Benadryl, Starting Tue 3/3/2020, Normal      ondansetron (ZOFRAN-ODT) 4 mg disintegrating tablet Take 1 tablet (4 mg total) by mouth every 8 (eight) hours as needed for nausea or vomiting, Starting Tue 3/3/2020, Normal         CONTINUE these medications which have NOT CHANGED    Details   albuterol (VENTOLIN HFA) 90 mcg/act inhaler Inhale 2 puffs, Starting Fri 5/10/2019, Historical Med      cyclobenzaprine (FLEXERIL) 10 mg tablet Take 1 tablet (10 mg total) by mouth 3 (three) times a day for 7 days, Starting Wed 10/30/2019, Until Wed 11/6/2019, Normal      FLUoxetine (PROzac) 40 MG capsule TAKE 1 CAPSULE BY MOUTH ONCE DAILY, Historical Med      montelukast (SINGULAIR) 10 mg tablet Take 10 mg by mouth, Starting Fri 5/10/2019, Historical Med      topiramate (TOPAMAX) 50 MG tablet Take 1/2 tablet twice daily for 1 week, then 1/2 tab in morning and 1 tab at night for 1 week, then 1 tab twice a day, Historical Med           No discharge procedures on file      PDMP Review     None          ED Provider  Electronically Signed by           Esau Mckinnon MD  03/14/20 0251

## 2020-06-11 ENCOUNTER — OFFICE VISIT (OUTPATIENT)
Dept: FAMILY MEDICINE CLINIC | Facility: CLINIC | Age: 35
End: 2020-06-11
Payer: COMMERCIAL

## 2020-06-11 VITALS
WEIGHT: 293 LBS | HEIGHT: 64 IN | HEART RATE: 106 BPM | BODY MASS INDEX: 50.02 KG/M2 | RESPIRATION RATE: 18 BRPM | SYSTOLIC BLOOD PRESSURE: 116 MMHG | OXYGEN SATURATION: 96 % | TEMPERATURE: 98.6 F | DIASTOLIC BLOOD PRESSURE: 84 MMHG

## 2020-06-11 DIAGNOSIS — Z76.89 ENCOUNTER TO ESTABLISH CARE: Primary | ICD-10-CM

## 2020-06-11 DIAGNOSIS — G43.009 MIGRAINE WITHOUT AURA AND WITHOUT STATUS MIGRAINOSUS, NOT INTRACTABLE: ICD-10-CM

## 2020-06-11 DIAGNOSIS — Z11.4 SCREENING FOR HIV WITHOUT PRESENCE OF RISK FACTORS: ICD-10-CM

## 2020-06-11 DIAGNOSIS — M25.552 BILATERAL HIP PAIN: ICD-10-CM

## 2020-06-11 DIAGNOSIS — E66.01 MORBID OBESITY WITH BMI OF 50.0-59.9, ADULT (HCC): ICD-10-CM

## 2020-06-11 DIAGNOSIS — K21.9 GASTROESOPHAGEAL REFLUX DISEASE, ESOPHAGITIS PRESENCE NOT SPECIFIED: ICD-10-CM

## 2020-06-11 DIAGNOSIS — J45.30 MILD PERSISTENT ASTHMA WITHOUT COMPLICATION: ICD-10-CM

## 2020-06-11 DIAGNOSIS — M25.551 BILATERAL HIP PAIN: ICD-10-CM

## 2020-06-11 DIAGNOSIS — Z12.4 CERVICAL CANCER SCREENING: ICD-10-CM

## 2020-06-11 PROBLEM — F31.76 BIPOLAR DISORDER, IN FULL REMISSION, MOST RECENT EPISODE DEPRESSED (HCC): Status: ACTIVE | Noted: 2019-05-10

## 2020-06-11 PROCEDURE — 99214 OFFICE O/P EST MOD 30 MIN: CPT | Performed by: FAMILY MEDICINE

## 2020-06-11 PROCEDURE — T1015 CLINIC SERVICE: HCPCS | Performed by: FAMILY MEDICINE

## 2020-06-11 RX ORDER — ALBUTEROL SULFATE 2.5 MG/3ML
2.5 SOLUTION RESPIRATORY (INHALATION) EVERY 6 HOURS PRN
Qty: 60 VIAL | Refills: 5 | Status: SHIPPED | OUTPATIENT
Start: 2020-06-11 | End: 2022-07-26 | Stop reason: SDUPTHER

## 2020-06-11 RX ORDER — TOPIRAMATE 50 MG/1
50 TABLET, FILM COATED ORAL 2 TIMES DAILY
Qty: 60 TABLET | Refills: 5 | Status: SHIPPED | OUTPATIENT
Start: 2020-06-11

## 2020-06-11 RX ORDER — MONTELUKAST SODIUM 10 MG/1
10 TABLET ORAL DAILY
Qty: 30 TABLET | Refills: 5 | Status: SHIPPED | OUTPATIENT
Start: 2020-06-11

## 2020-06-11 RX ORDER — LORAZEPAM 1 MG/1
1 TABLET ORAL
COMMUNITY

## 2020-06-11 RX ORDER — FAMOTIDINE 20 MG/1
20 TABLET, FILM COATED ORAL 2 TIMES DAILY
Qty: 60 TABLET | Refills: 5 | Status: SHIPPED | OUTPATIENT
Start: 2020-06-11 | End: 2020-08-17 | Stop reason: SDUPTHER

## 2020-06-11 RX ORDER — ALBUTEROL SULFATE 90 UG/1
2 AEROSOL, METERED RESPIRATORY (INHALATION) EVERY 4 HOURS PRN
Qty: 18 G | Refills: 5 | Status: SHIPPED | OUTPATIENT
Start: 2020-06-11 | End: 2022-07-26 | Stop reason: SDUPTHER

## 2020-06-16 ENCOUNTER — TELEPHONE (OUTPATIENT)
Dept: FAMILY MEDICINE CLINIC | Facility: CLINIC | Age: 35
End: 2020-06-16

## 2020-06-22 ENCOUNTER — OFFICE VISIT (OUTPATIENT)
Dept: FAMILY MEDICINE CLINIC | Facility: CLINIC | Age: 35
End: 2020-06-22
Payer: COMMERCIAL

## 2020-06-22 ENCOUNTER — APPOINTMENT (OUTPATIENT)
Dept: LAB | Facility: HOSPITAL | Age: 35
End: 2020-06-22
Payer: COMMERCIAL

## 2020-06-22 VITALS
HEART RATE: 100 BPM | HEIGHT: 66 IN | DIASTOLIC BLOOD PRESSURE: 84 MMHG | RESPIRATION RATE: 18 BRPM | BODY MASS INDEX: 47.09 KG/M2 | WEIGHT: 293 LBS | SYSTOLIC BLOOD PRESSURE: 120 MMHG | OXYGEN SATURATION: 99 % | TEMPERATURE: 97.4 F

## 2020-06-22 DIAGNOSIS — Z72.0 TOBACCO ABUSE: Primary | ICD-10-CM

## 2020-06-22 DIAGNOSIS — E78.00 PURE HYPERCHOLESTEROLEMIA: ICD-10-CM

## 2020-06-22 DIAGNOSIS — M25.551 BILATERAL HIP PAIN: ICD-10-CM

## 2020-06-22 DIAGNOSIS — E66.01 MORBID OBESITY WITH BMI OF 50.0-59.9, ADULT (HCC): ICD-10-CM

## 2020-06-22 DIAGNOSIS — Z11.4 SCREENING FOR HIV WITHOUT PRESENCE OF RISK FACTORS: ICD-10-CM

## 2020-06-22 DIAGNOSIS — M25.552 BILATERAL HIP PAIN: ICD-10-CM

## 2020-06-22 LAB
ALBUMIN SERPL BCP-MCNC: 3.6 G/DL (ref 3.5–5)
ALP SERPL-CCNC: 80 U/L (ref 46–116)
ALT SERPL W P-5'-P-CCNC: 52 U/L (ref 12–78)
ANION GAP SERPL CALCULATED.3IONS-SCNC: 11 MMOL/L (ref 4–13)
AST SERPL W P-5'-P-CCNC: 31 U/L (ref 5–45)
BASOPHILS # BLD AUTO: 0.05 THOUSANDS/ΜL (ref 0–0.1)
BASOPHILS NFR BLD AUTO: 1 % (ref 0–1)
BILIRUB SERPL-MCNC: 0.5 MG/DL (ref 0.2–1)
BUN SERPL-MCNC: 8 MG/DL (ref 5–25)
CALCIUM SERPL-MCNC: 9 MG/DL (ref 8.3–10.1)
CHLORIDE SERPL-SCNC: 106 MMOL/L (ref 100–108)
CHOLEST SERPL-MCNC: 204 MG/DL (ref 50–200)
CO2 SERPL-SCNC: 22 MMOL/L (ref 21–32)
CREAT SERPL-MCNC: 0.68 MG/DL (ref 0.6–1.3)
EOSINOPHIL # BLD AUTO: 0.24 THOUSAND/ΜL (ref 0–0.61)
EOSINOPHIL NFR BLD AUTO: 2 % (ref 0–6)
ERYTHROCYTE [DISTWIDTH] IN BLOOD BY AUTOMATED COUNT: 13.4 % (ref 11.6–15.1)
GFR SERPL CREATININE-BSD FRML MDRD: 115 ML/MIN/1.73SQ M
GLUCOSE P FAST SERPL-MCNC: 97 MG/DL (ref 65–99)
HCT VFR BLD AUTO: 44.5 % (ref 34.8–46.1)
HDLC SERPL-MCNC: 30 MG/DL
HGB BLD-MCNC: 14.7 G/DL (ref 11.5–15.4)
IMM GRANULOCYTES # BLD AUTO: 0.08 THOUSAND/UL (ref 0–0.2)
IMM GRANULOCYTES NFR BLD AUTO: 1 % (ref 0–2)
LDLC SERPL CALC-MCNC: 148 MG/DL (ref 0–100)
LYMPHOCYTES # BLD AUTO: 2.6 THOUSANDS/ΜL (ref 0.6–4.47)
LYMPHOCYTES NFR BLD AUTO: 24 % (ref 14–44)
MCH RBC QN AUTO: 29.2 PG (ref 26.8–34.3)
MCHC RBC AUTO-ENTMCNC: 33 G/DL (ref 31.4–37.4)
MCV RBC AUTO: 88 FL (ref 82–98)
MONOCYTES # BLD AUTO: 0.46 THOUSAND/ΜL (ref 0.17–1.22)
MONOCYTES NFR BLD AUTO: 4 % (ref 4–12)
NEUTROPHILS # BLD AUTO: 7.35 THOUSANDS/ΜL (ref 1.85–7.62)
NEUTS SEG NFR BLD AUTO: 68 % (ref 43–75)
NONHDLC SERPL-MCNC: 174 MG/DL
NRBC BLD AUTO-RTO: 0 /100 WBCS
PLATELET # BLD AUTO: 314 THOUSANDS/UL (ref 149–390)
PMV BLD AUTO: 8.8 FL (ref 8.9–12.7)
POTASSIUM SERPL-SCNC: 3.7 MMOL/L (ref 3.5–5.3)
PROT SERPL-MCNC: 7.7 G/DL (ref 6.4–8.2)
RBC # BLD AUTO: 5.04 MILLION/UL (ref 3.81–5.12)
SODIUM SERPL-SCNC: 139 MMOL/L (ref 136–145)
TRIGL SERPL-MCNC: 131 MG/DL
TSH SERPL DL<=0.05 MIU/L-ACNC: 1.37 UIU/ML (ref 0.36–3.74)
WBC # BLD AUTO: 10.78 THOUSAND/UL (ref 4.31–10.16)

## 2020-06-22 PROCEDURE — 80053 COMPREHEN METABOLIC PANEL: CPT

## 2020-06-22 PROCEDURE — 84443 ASSAY THYROID STIM HORMONE: CPT

## 2020-06-22 PROCEDURE — 85025 COMPLETE CBC W/AUTO DIFF WBC: CPT

## 2020-06-22 PROCEDURE — 87389 HIV-1 AG W/HIV-1&-2 AB AG IA: CPT

## 2020-06-22 PROCEDURE — 36415 COLL VENOUS BLD VENIPUNCTURE: CPT

## 2020-06-22 PROCEDURE — 80061 LIPID PANEL: CPT

## 2020-06-22 PROCEDURE — 99213 OFFICE O/P EST LOW 20 MIN: CPT | Performed by: FAMILY MEDICINE

## 2020-06-22 PROCEDURE — 3008F BODY MASS INDEX DOCD: CPT | Performed by: ORTHOPAEDIC SURGERY

## 2020-06-22 PROCEDURE — T1015 CLINIC SERVICE: HCPCS | Performed by: FAMILY MEDICINE

## 2020-06-22 RX ORDER — ARIPIPRAZOLE 10 MG/1
10 TABLET ORAL DAILY
COMMUNITY

## 2020-06-22 RX ORDER — ATORVASTATIN CALCIUM 10 MG/1
10 TABLET, FILM COATED ORAL DAILY
Qty: 30 TABLET | Refills: 2 | Status: SHIPPED | OUTPATIENT
Start: 2020-06-22 | End: 2020-09-30 | Stop reason: SDUPTHER

## 2020-06-22 RX ORDER — MELOXICAM 7.5 MG/1
7.5 TABLET ORAL DAILY
Qty: 30 TABLET | Refills: 2 | Status: SHIPPED | OUTPATIENT
Start: 2020-06-22 | End: 2020-07-21 | Stop reason: ALTCHOICE

## 2020-06-22 RX ORDER — VARENICLINE TARTRATE 25 MG
KIT ORAL
Qty: 53 TABLET | Refills: 0 | Status: SHIPPED | OUTPATIENT
Start: 2020-06-22 | End: 2020-07-21 | Stop reason: DRUGHIGH

## 2020-06-23 LAB — HIV 1+2 AB+HIV1 P24 AG SERPL QL IA: NORMAL

## 2020-07-02 ENCOUNTER — HOSPITAL ENCOUNTER (OUTPATIENT)
Dept: RADIOLOGY | Facility: HOSPITAL | Age: 35
Discharge: HOME | End: 2020-07-02
Payer: COMMERCIAL

## 2020-07-02 DIAGNOSIS — M25.552 BILATERAL HIP PAIN: ICD-10-CM

## 2020-07-02 DIAGNOSIS — M25.551 BILATERAL HIP PAIN: ICD-10-CM

## 2020-07-02 PROCEDURE — 73523 X-RAY EXAM HIPS BI 5/> VIEWS: CPT

## 2020-07-08 DIAGNOSIS — M16.0 OSTEOARTHRITIS OF BOTH HIPS, UNSPECIFIED OSTEOARTHRITIS TYPE: Primary | ICD-10-CM

## 2020-07-14 DIAGNOSIS — Z72.0 TOBACCO ABUSE: ICD-10-CM

## 2020-07-14 RX ORDER — VARENICLINE TARTRATE 25 MG
KIT ORAL
Qty: 53 TABLET | Refills: 0 | Status: CANCELLED | OUTPATIENT
Start: 2020-07-14

## 2020-07-15 DIAGNOSIS — Z72.0 TOBACCO ABUSE: ICD-10-CM

## 2020-07-15 RX ORDER — VARENICLINE TARTRATE 25 MG
KIT ORAL
Qty: 53 TABLET | Refills: 0 | OUTPATIENT
Start: 2020-07-15

## 2020-07-15 NOTE — TELEPHONE ENCOUNTER
Patient should scheduled apt with Aileen Pérez to discuss if continuing chantix is appropriate  Thanks!

## 2020-07-16 NOTE — TELEPHONE ENCOUNTER
I put her in for a virtual visit for next Monday with [facility]  Thanks!

## 2020-07-21 ENCOUNTER — HOSPITAL ENCOUNTER (EMERGENCY)
Facility: HOSPITAL | Age: 35
Discharge: HOME | End: 2020-07-21
Attending: EMERGENCY MEDICINE | Admitting: EMERGENCY MEDICINE
Payer: COMMERCIAL

## 2020-07-21 ENCOUNTER — APPOINTMENT (EMERGENCY)
Dept: RADIOLOGY | Facility: HOSPITAL | Age: 35
End: 2020-07-21
Payer: COMMERCIAL

## 2020-07-21 ENCOUNTER — TELEMEDICINE (OUTPATIENT)
Dept: FAMILY MEDICINE CLINIC | Facility: CLINIC | Age: 35
End: 2020-07-21
Payer: COMMERCIAL

## 2020-07-21 VITALS
TEMPERATURE: 97 F | HEIGHT: 64 IN | WEIGHT: 293 LBS | SYSTOLIC BLOOD PRESSURE: 133 MMHG | BODY MASS INDEX: 50.02 KG/M2 | OXYGEN SATURATION: 97 % | HEART RATE: 100 BPM | RESPIRATION RATE: 16 BRPM | DIASTOLIC BLOOD PRESSURE: 85 MMHG

## 2020-07-21 DIAGNOSIS — M16.0 OSTEOARTHRITIS OF BOTH HIPS, UNSPECIFIED OSTEOARTHRITIS TYPE: ICD-10-CM

## 2020-07-21 DIAGNOSIS — M25.512 SHOULDER PAIN, LEFT: Primary | ICD-10-CM

## 2020-07-21 DIAGNOSIS — Z72.0 TOBACCO ABUSE: Primary | ICD-10-CM

## 2020-07-21 PROCEDURE — 99284 EMERGENCY DEPT VISIT MOD MDM: CPT | Performed by: EMERGENCY MEDICINE

## 2020-07-21 PROCEDURE — G0071 COMM SVCS BY RHC/FQHC 5 MIN: HCPCS | Performed by: FAMILY MEDICINE

## 2020-07-21 PROCEDURE — 96372 THER/PROPH/DIAG INJ SC/IM: CPT

## 2020-07-21 PROCEDURE — 99283 EMERGENCY DEPT VISIT LOW MDM: CPT

## 2020-07-21 PROCEDURE — 73030 X-RAY EXAM OF SHOULDER: CPT

## 2020-07-21 RX ORDER — COVID-19 ANTIGEN TEST
KIT MISCELLANEOUS
COMMUNITY
End: 2020-10-27

## 2020-07-21 RX ORDER — METHOCARBAMOL 500 MG/1
500 TABLET, FILM COATED ORAL 2 TIMES DAILY
Qty: 20 TABLET | Refills: 0 | Status: SHIPPED | OUTPATIENT
Start: 2020-07-21 | End: 2020-10-27

## 2020-07-21 RX ORDER — METHOCARBAMOL 500 MG/1
500 TABLET, FILM COATED ORAL ONCE
Status: COMPLETED | OUTPATIENT
Start: 2020-07-21 | End: 2020-07-21

## 2020-07-21 RX ORDER — ACETAMINOPHEN 325 MG/1
650 TABLET ORAL ONCE
Status: COMPLETED | OUTPATIENT
Start: 2020-07-21 | End: 2020-07-21

## 2020-07-21 RX ORDER — KETOROLAC TROMETHAMINE 30 MG/ML
15 INJECTION, SOLUTION INTRAMUSCULAR; INTRAVENOUS ONCE
Status: COMPLETED | OUTPATIENT
Start: 2020-07-21 | End: 2020-07-21

## 2020-07-21 RX ORDER — VARENICLINE TARTRATE 1 MG/1
1 TABLET, FILM COATED ORAL 2 TIMES DAILY
Qty: 56 TABLET | Refills: 1 | Status: SHIPPED | OUTPATIENT
Start: 2020-07-21 | End: 2020-10-27

## 2020-07-21 RX ADMIN — ACETAMINOPHEN 650 MG: 325 TABLET, FILM COATED ORAL at 20:03

## 2020-07-21 RX ADMIN — METHOCARBAMOL TABLETS 500 MG: 500 TABLET, COATED ORAL at 20:03

## 2020-07-21 RX ADMIN — KETOROLAC TROMETHAMINE 15 MG: 30 INJECTION, SOLUTION INTRAMUSCULAR at 20:03

## 2020-07-21 NOTE — PROGRESS NOTES
Virtual Brief Visit    Assessment/Plan:    Problem List Items Addressed This Visit     None      Visit Diagnoses     Tobacco abuse    -  Primary    Relevant Medications    varenicline (Chantix Continuing Month Kenan) 1 mg tablet    Osteoarthritis of both hips, unspecified osteoarthritis type        Relevant Medications    diclofenac sodium (VOLTAREN) 50 mg EC tablet        - Will stop meloxicam and try Voltaren 50 mg BID for bilateral hip pain  Advised to schedule with orthopedics  - Will continue with Chantix and follow up in 1 month        Reason for visit is   Chief Complaint   Patient presents with    Medication Refill     pt wants her chantix refilled, down to 3-4 cigarettes a week - currently on chantix starter pack     Virtual Brief Visit        Encounter provider William Garcia PA-C    Provider located at [address] [address]    Recent Visits  No visits were found meeting these conditions  Showing recent visits within past 7 days and meeting all other requirements     Today's Visits  Date Type Provider Dept   07/21/20 [address] [facility], [place] [address] today's visits and meeting all other requirements     Future Appointments  No visits were found meeting these conditions  Showing future appointments within next 150 days and meeting all other requirements        After connecting through telephone, the patient was identified by name and date of birth  Jovana Liz was informed that this is a telemedicine visit and that the visit is being conducted through telephone  My office door was closed  No one else was in the room  She acknowledged consent and understanding of privacy and security of the platform  The patient has agreed to participate and understands she can discontinue the visit at any time    It was my intent to perform this visit via video technology but the patient was not able to do a video connection so the visit was completed via audio telephone only  Patient is aware this is a billable service  Subjective    Adriana Lopez is a 30 y o  female  Adithya Lane is a 36year old female presenting for follow up since starting chantix on 6/22 for smoking cessation  Patient was previously smoking 1/2 ppd, now down to 3-4 cigarettes per day  Denies any side effects from the medication, including bad dreams, worsening depression or anxiety      Patient also complains of ongoing bilateral hip pain  She has tried naproxen and voltaren gel in the past with little relief  At last visit, she was started on meloxicam 7 5 mg daily which she states did not do anything for her pain  Xrays done 7/2/20 showed bilateral hip joint mild osteoarthritis  She has been referred to orthopedics but has not yet followed up         Past Medical History:   Diagnosis Date    Asthma        Past Surgical History:   Procedure Laterality Date    CHOLECYSTECTOMY      TONSILLECTOMY      TUBAL LIGATION         Current Outpatient Medications   Medication Sig Dispense Refill    albuterol (2 5 mg/3 mL) 0 083 % nebulizer solution Take 1 vial (2 5 mg total) by nebulization every 6 (six) hours as needed for wheezing or shortness of breath 60 vial 5    albuterol (Ventolin HFA) 90 mcg/act inhaler Inhale 2 puffs every 4 (four) hours as needed for wheezing or shortness of breath 18 g 5    ARIPiprazole (ABILIFY) 10 mg tablet Take 10 mg by mouth daily      atorvastatin (LIPITOR) 10 mg tablet Take 1 tablet (10 mg total) by mouth daily 30 tablet 2    diclofenac sodium (VOLTAREN) 1 % Apply 2 g topically 4 (four) times a day 100 g 2    famotidine (PEPCID) 20 mg tablet Take 1 tablet (20 mg total) by mouth 2 (two) times a day 60 tablet 5    LORazepam (ATIVAN) 1 mg tablet Take 1 mg by mouth daily at bedtime      montelukast (SINGULAIR) 10 mg tablet Take 1 tablet (10 mg total) by mouth daily 30 tablet 5    Nebulizers (NEBULIZER COMPRESSOR) MISC Use as directed 1 each 0    topiramate (Topamax) 50 MG tablet Take 1 tablet (50 mg total) by mouth 2 (two) times a day 60 tablet 5    diclofenac sodium (VOLTAREN) 50 mg EC tablet Take 1 tablet (50 mg total) by mouth 2 (two) times a day 60 tablet 1    diphenhydrAMINE (BENADRYL) 25 mg capsule Take one with each Reglan (metoclopramide) (Patient not taking: Reported on 6/22/2020) 20 capsule 0    varenicline (Chantix Continuing Month Kenan) 1 mg tablet Take 1 tablet (1 mg total) by mouth 2 (two) times a day 56 tablet 1     No current facility-administered medications for this visit  Allergies   Allergen Reactions    Medical Tape Rash     Rips skin off       Review of Systems   Constitutional: Negative for appetite change, chills, diaphoresis, fever and unexpected weight change  HENT: Negative for ear pain, sore throat and trouble swallowing  Eyes: Negative for photophobia, pain, discharge, redness, itching and visual disturbance  Respiratory: Negative for cough, chest tightness, shortness of breath and wheezing  Cardiovascular: Negative for chest pain, palpitations and leg swelling  Gastrointestinal: Negative for abdominal pain, constipation, diarrhea, nausea and vomiting  Genitourinary: Negative for difficulty urinating, dysuria, frequency and hematuria  Musculoskeletal: Positive for arthralgias (bilateral hips)  Negative for back pain, neck pain and neck stiffness  Skin: Negative for rash and wound  Neurological: Positive for headaches (chronic)  Negative for dizziness, syncope, weakness and light-headedness  Psychiatric/Behavioral: Negative for dysphoric mood, self-injury, sleep disturbance and suicidal ideas  The patient is not nervous/anxious  There were no vitals filed for this visit        I spent 10 minutes directly with the patient during this visit    VIRTUAL VISIT DISCLAIMER    Rosa Sebastian acknowledges that she has consented to an online visit or consultation  She understands that the online visit is based solely on information provided by her, and that, in the absence of a face-to-face physical evaluation by the physician, the diagnosis she receives is both limited and provisional in terms of accuracy and completeness  This is not intended to replace a full medical face-to-face evaluation by the physician  James Ma understands and accepts these terms

## 2020-07-23 NOTE — ED PROVIDER NOTES
History  Chief Complaint   Patient presents with    Shoulder Pain     c/o left shoulder pain x2 weeks  denies injury  last had ibuprofen this morning      HPI  61-year-old woman comes in with left shoulder pain  Patient states it has been intermittent but progressively worsening for the last 2 weeks  Denies any inciting event, denies any trauma, denies any lifting  Pain is left side of her neck, left trapezius into her left shoulder  She now has pain movement of her left shoulder  Has not been taking any over-the-counter medicine  Denies any numbness or tingling in her left arm, denies any problems with   Denies fevers chills sweats denies chest pain or abdominal pain  Prior to Admission Medications   Prescriptions Last Dose Informant Patient Reported? Taking?    ARIPiprazole (ABILIFY) 10 mg tablet 7/21/2020 at Unknown time  Yes Yes   Sig: Take 10 mg by mouth daily   LORazepam (ATIVAN) 1 mg tablet 7/20/2020 at Unknown time  Yes Yes   Sig: Take 1 mg by mouth daily at bedtime   Naproxen Sodium (Aleve) 220 MG CAPS 7/21/2020 at Unknown time  Yes Yes   Sig: Take by mouth   Nebulizers (NEBULIZER COMPRESSOR) MISC   No No   Sig: Use as directed   albuterol (2 5 mg/3 mL) 0 083 % nebulizer solution Past Week at Unknown time  No Yes   Sig: Take 1 vial (2 5 mg total) by nebulization every 6 (six) hours as needed for wheezing or shortness of breath   albuterol (Ventolin HFA) 90 mcg/act inhaler 7/21/2020 at Unknown time  No Yes   Sig: Inhale 2 puffs every 4 (four) hours as needed for wheezing or shortness of breath   atorvastatin (LIPITOR) 10 mg tablet 7/21/2020 at Unknown time  No Yes   Sig: Take 1 tablet (10 mg total) by mouth daily   diclofenac sodium (VOLTAREN) 1 % 7/21/2020 at Unknown time  No Yes   Sig: Apply 2 g topically 4 (four) times a day   diclofenac sodium (VOLTAREN) 50 mg EC tablet 7/21/2020 at Unknown time  No Yes   Sig: Take 1 tablet (50 mg total) by mouth 2 (two) times a day   diphenhydrAMINE (BENADRYL) 25 mg capsule Not Taking at Unknown time  No No   Sig: Take one with each Reglan (metoclopramide)   Patient not taking: Reported on 6/22/2020   famotidine (PEPCID) 20 mg tablet 7/21/2020 at Unknown time  No Yes   Sig: Take 1 tablet (20 mg total) by mouth 2 (two) times a day   montelukast (SINGULAIR) 10 mg tablet 7/21/2020 at Unknown time  No Yes   Sig: Take 1 tablet (10 mg total) by mouth daily   topiramate (Topamax) 50 MG tablet 7/21/2020 at Unknown time  No Yes   Sig: Take 1 tablet (50 mg total) by mouth 2 (two) times a day   varenicline (Chantix Continuing Month Kenan) 1 mg tablet 7/21/2020 at Unknown time  No Yes   Sig: Take 1 tablet (1 mg total) by mouth 2 (two) times a day      Facility-Administered Medications: None       Past Medical History:   Diagnosis Date    Asthma        Past Surgical History:   Procedure Laterality Date    CHOLECYSTECTOMY      TONSILLECTOMY      TUBAL LIGATION         Family History   Problem Relation Age of Onset    Mental illness Mother     Depression Mother     Cancer Mother     Hyperlipidemia Mother     Asthma Father     Arthritis Father     Cancer Brother     Psoriasis Daughter      I have reviewed and agree with the history as documented  E-Cigarette/Vaping    E-Cigarette Use Never User      E-Cigarette/Vaping Substances    Nicotine No     THC No     CBD No     Flavoring No     Other No     Unknown No      Social History     Tobacco Use    Smoking status: Current Some Day Smoker     Packs/day: 0 50     Types: Cigarettes    Smokeless tobacco: Never Used   Substance Use Topics    Alcohol use: Never     Frequency: Never    Drug use: Not Currently       Review of Systems   Constitutional: Negative  Negative for chills and fever  HENT: Negative  Negative for congestion and sore throat  Eyes: Negative  Negative for discharge and redness  Respiratory: Negative  Negative for chest tightness and shortness of breath      Cardiovascular: Negative  Negative for chest pain and palpitations  Gastrointestinal: Negative  Negative for abdominal pain, nausea and vomiting  Endocrine: Negative  Negative for cold intolerance and polyphagia  Genitourinary: Negative  Negative for difficulty urinating and dysuria  Musculoskeletal: Positive for arthralgias (Left shoulder pain)  Negative for back pain  Skin: Negative  Negative for color change and wound  Allergic/Immunologic: Negative  Negative for environmental allergies  Neurological: Negative  Negative for dizziness, weakness and headaches  Hematological: Negative  Psychiatric/Behavioral: Negative  Negative for behavioral problems  The patient is not nervous/anxious  All other systems reviewed and are negative  Physical Exam  Physical Exam   Constitutional: She is oriented to person, place, and time  She appears well-developed and well-nourished  No distress  HENT:   Head: Normocephalic and atraumatic  Right Ear: External ear normal    Left Ear: External ear normal    Mouth/Throat: Oropharynx is clear and moist    Eyes: Pupils are equal, round, and reactive to light  Conjunctivae and EOM are normal  Right eye exhibits no discharge  Left eye exhibits no discharge  No scleral icterus  Neck: Normal range of motion  Neck supple  No tracheal deviation present  No thyromegaly present  Cardiovascular: Normal rate, regular rhythm and intact distal pulses  Exam reveals no gallop and no friction rub  No murmur heard  Pulmonary/Chest: Effort normal and breath sounds normal  No stridor  No respiratory distress  She has no wheezes  She has no rales  Abdominal: Soft  Bowel sounds are normal  She exhibits no distension  There is no tenderness  There is no rebound and no guarding  Musculoskeletal: She exhibits tenderness (Patient is tender in the left trapezius)  She exhibits no edema or deformity     Patient has decreased range of motion of left shoulder, she has a hard time raising her arms above her head, she has a hard time abducting her arm, external rotation is very hard on the left  Neurological: She is alert and oriented to person, place, and time  No cranial nerve deficit  Skin: Skin is warm and dry  No rash noted  She is not diaphoretic  No erythema  Psychiatric: She has a normal mood and affect  Her behavior is normal  Thought content normal    Nursing note and vitals reviewed  Vital Signs  ED Triage Vitals [07/21/20 1912]   Temperature Pulse Respirations Blood Pressure SpO2   (!) 97 °F (36 1 °C) 96 16 132/79 98 %      Temp Source Heart Rate Source Patient Position - Orthostatic VS BP Location FiO2 (%)   Temporal Monitor Lying Left arm --      Pain Score       8           Vitals:    07/21/20 1912 07/21/20 2010   BP: 132/79 133/85   Pulse: 96 100   Patient Position - Orthostatic VS: Lying Lying         Visual Acuity      ED Medications  Medications   ketorolac (TORADOL) injection 15 mg (15 mg Intramuscular Given 7/21/20 2003)   methocarbamol (ROBAXIN) tablet 500 mg (500 mg Oral Given 7/21/20 2003)   acetaminophen (TYLENOL) tablet 650 mg (650 mg Oral Given 7/21/20 2003)       Diagnostic Studies  Results Reviewed     None                 XR shoulder 2+ views LEFT   Final Result by Dede Mishra MD (07/21 2048)      No acute osseous abnormality  Workstation performed: [id number]                    Procedures  Procedures         ED Course      x-ray unremarkable  Will discharge patient with follow-up with her primary care provider  I personally discussed return precautions with this patient and family  I provided the patient with written discharge instructions and particularly highlighted specific areas of interest to this patient, including but not limited to: medications for symptom managment, follow up recommendations, and return precautions  Patient and family are in agreement with this plan as outlined above          US AUDIT      Most Recent Value Initial Alcohol Screen: US AUDIT-C    1  How often do you have a drink containing alcohol?  0 Filed at: 07/21/2020 1912   2  How many drinks containing alcohol do you have on a typical day you are drinking? 0 Filed at: 07/21/2020 1912   3b  FEMALE Any Age, or MALE 65+: How often do you have 4 or more drinks on one occassion? 0 Filed at: 07/21/2020 1912   Audit-C Score  0 Filed at: 07/21/2020 1912                  YINKA/DAST-10      Most Recent Value   How many times in the past year have you    Used an illegal drug or used a prescription medication for non-medical reasons? Never Filed at: 07/21/2020 1920                                MDM  Number of Diagnoses or Management Options  Shoulder pain, left:   Diagnosis management comments: 30 woman with left shoulder pain  Will get an x-ray to evaluate for any fracture or dislocation or arthritis  Will give muscle relaxants  Disposition  Final diagnoses:   Shoulder pain, left     Time reflects when diagnosis was documented in both MDM as applicable and the Disposition within this note     Time User Action Codes Description Comment    7/21/2020  7:49 PM Jayla Meraz Add [E47 [id number]] Shoulder pain, left       ED Disposition     ED Disposition Condition Date/Time Comment    Discharge Stable Tue Jul 21, 2020  8:05 PM Rambo Graff discharge to home/self care              Follow-up Information     Follow up With Specialties Details Why Contact Info Additional [address] Deon Hebert PA-C Family Medicine Call in 1 day follow up being seen in the emergency department [address] [place] [address] [id number] [place] [address] Emergency Department Emergency Medicine Go to  As needed, If symptoms worsen [phone]  [phone] [place] [facility], [address], [address]          Discharge Medication List as of 7/21/2020  8:05 PM      START taking these medications    Details methocarbamol (ROBAXIN) 500 mg tablet Take 1 tablet (500 mg total) by mouth 2 (two) times a day, Starting Tue 7/21/2020, Normal         CONTINUE these medications which have NOT CHANGED    Details   albuterol (2 5 mg/3 mL) 0 083 % nebulizer solution Take 1 vial (2 5 mg total) by nebulization every 6 (six) hours as needed for wheezing or shortness of breath, Starting Thu 6/11/2020, Normal      albuterol (Ventolin HFA) 90 mcg/act inhaler Inhale 2 puffs every 4 (four) hours as needed for wheezing or shortness of breath, Starting Thu 6/11/2020, Normal      ARIPiprazole (ABILIFY) 10 mg tablet Take 10 mg by mouth daily, Historical Med      atorvastatin (LIPITOR) 10 mg tablet Take 1 tablet (10 mg total) by mouth daily, Starting Mon 6/22/2020, Normal      diclofenac sodium (VOLTAREN) 1 % Apply 2 g topically 4 (four) times a day, Starting Thu 6/11/2020, Normal      diclofenac sodium (VOLTAREN) 50 mg EC tablet Take 1 tablet (50 mg total) by mouth 2 (two) times a day, Starting Tue 7/21/2020, Normal      famotidine (PEPCID) 20 mg tablet Take 1 tablet (20 mg total) by mouth 2 (two) times a day, Starting Thu 6/11/2020, Normal      LORazepam (ATIVAN) 1 mg tablet Take 1 mg by mouth daily at bedtime, Historical Med      montelukast (SINGULAIR) 10 mg tablet Take 1 tablet (10 mg total) by mouth daily, Starting Thu 6/11/2020, Normal      Naproxen Sodium (Aleve) 220 MG CAPS Take by mouth, Historical Med      topiramate (Topamax) 50 MG tablet Take 1 tablet (50 mg total) by mouth 2 (two) times a day, Starting Thu 6/11/2020, Normal      varenicline (Chantix Continuing Month Pak) 1 mg tablet Take 1 tablet (1 mg total) by mouth 2 (two) times a day, Starting Tue 7/21/2020, Normal      diphenhydrAMINE (BENADRYL) 25 mg capsule Take one with each Reglan (metoclopramide), Normal      Nebulizers (NEBULIZER COMPRESSOR) MISC Use as directed, Normal           No discharge procedures on file      PDMP Review     None          ED Provider  Electronically Signed by           Андрей Delgadillo MD  07/23/20 9182

## 2020-07-28 ENCOUNTER — OFFICE VISIT (OUTPATIENT)
Dept: OBGYN CLINIC | Facility: CLINIC | Age: 35
End: 2020-07-28
Payer: COMMERCIAL

## 2020-07-28 VITALS
WEIGHT: 293 LBS | HEART RATE: 74 BPM | DIASTOLIC BLOOD PRESSURE: 81 MMHG | TEMPERATURE: 97.7 F | BODY MASS INDEX: 53.21 KG/M2 | SYSTOLIC BLOOD PRESSURE: 109 MMHG

## 2020-07-28 DIAGNOSIS — E66.01 CLASS 3 SEVERE OBESITY WITH BODY MASS INDEX (BMI) OF 50.0 TO 59.9 IN ADULT, UNSPECIFIED OBESITY TYPE, UNSPECIFIED WHETHER SERIOUS COMORBIDITY PRESENT (HCC): ICD-10-CM

## 2020-07-28 DIAGNOSIS — M16.0 BILATERAL HIP JOINT ARTHRITIS: Primary | ICD-10-CM

## 2020-07-28 PROCEDURE — 99203 OFFICE O/P NEW LOW 30 MIN: CPT | Performed by: ORTHOPAEDIC SURGERY

## 2020-07-28 NOTE — PROGRESS NOTES
30 y o female presents for evaluation of right hip pain  She was told she has bilateral hip arthritis  Her BMI is 53 21  Her brother has gone to [facility] She has not had any other treatment for this  She notes her right hip is more symptomatic than her left  Her primary complaints are pain over the anterior lateral aspect of each hip without radiation down the leg  She states that she will have morning stiffness that is present for approximately 1 hour upon rising  She denies numbness or tingling  She has no known contributing trauma or injury but did play basketball when she was younger  Although having multiple tattoos she does have a fear of needles and has some concern about hip injection      Review of Systems  Review of systems negative unless otherwise specified in HPI    Past Medical History  Past Medical History:   Diagnosis Date    Asthma      Past Surgical History  Past Surgical History:   Procedure Laterality Date    CHOLECYSTECTOMY      TONSILLECTOMY      TUBAL LIGATION       Current Medications  Current Outpatient Medications on File Prior to Visit   Medication Sig Dispense Refill    albuterol (2 5 mg/3 mL) 0 083 % nebulizer solution Take 1 vial (2 5 mg total) by nebulization every 6 (six) hours as needed for wheezing or shortness of breath 60 vial 5    albuterol (Ventolin HFA) 90 mcg/act inhaler Inhale 2 puffs every 4 (four) hours as needed for wheezing or shortness of breath 18 g 5    ARIPiprazole (ABILIFY) 10 mg tablet Take 10 mg by mouth daily      atorvastatin (LIPITOR) 10 mg tablet Take 1 tablet (10 mg total) by mouth daily 30 tablet 2    diclofenac sodium (VOLTAREN) 1 % Apply 2 g topically 4 (four) times a day 100 g 2    diclofenac sodium (VOLTAREN) 50 mg EC tablet Take 1 tablet (50 mg total) by mouth 2 (two) times a day 60 tablet 1    famotidine (PEPCID) 20 mg tablet Take 1 tablet (20 mg total) by mouth 2 (two) times a day 60 tablet 5    LORazepam (ATIVAN) 1 mg tablet Take 1 mg by mouth daily at bedtime      methocarbamol (ROBAXIN) 500 mg tablet Take 1 tablet (500 mg total) by mouth 2 (two) times a day 20 tablet 0    montelukast (SINGULAIR) 10 mg tablet Take 1 tablet (10 mg total) by mouth daily 30 tablet 5    Naproxen Sodium (Aleve) 220 MG CAPS Take by mouth      Nebulizers (NEBULIZER COMPRESSOR) MISC Use as directed 1 each 0    topiramate (Topamax) 50 MG tablet Take 1 tablet (50 mg total) by mouth 2 (two) times a day 60 tablet 5    varenicline (Chantix Continuing Month Kenan) 1 mg tablet Take 1 tablet (1 mg total) by mouth 2 (two) times a day 56 tablet 1    diphenhydrAMINE (BENADRYL) 25 mg capsule Take one with each Reglan (metoclopramide) (Patient not taking: Reported on 6/22/2020) 20 capsule 0     No current facility-administered medications on file prior to visit  Recent Labs [facility])  0   Lab Value Date/Time    HCT 44 5 06/22/2020 1226    HCT 44 4 04/14/2015 1426    HGB 14 7 06/22/2020 1226    HGB 15 4 04/14/2015 1426    WBC 10 78 (H) 06/22/2020 1226    WBC 9 96 04/14/2015 1426    INR 0 99 12/05/2014 2155    GLUCOSE 111 04/14/2015 1400    HGBA1C 5 3 02/23/2018 0917     Physical exam  Body mass index is 53 21 kg/m²  · General: Awake, Alert, Oriented  · Eyes: Pupils equal, round and reactive to light  · Heart: regular rate and rhythm  · Lungs: No audible wheezing  · Abdomen: soft  bilateral Lower extremity  · Patient was slight antalgic gait upon rising for the 1st few steps  Seated right hip motion approximately 20° of internal rotation approximately 40° external rotation  Seated left hip motion notes 35° of internal rotation and 50° of external rotation  Leg lengths grossly the same  Thigh is a large in rotund  Adequate abduction strength and abduction strength  Grossly neurovascular intact  Procedure  None today    Imaging  Previous x-rays both hips from July 2, 2020 note mild arthritic changes bleed be osteoarthritis    No fracture dislocation  1  Bilateral hip joint arthritis      Assessment:  bilateral hip hip joint arthritis and a young person with a BMI 53 21  Plan:  Patient already has referral for physical therapy set up her PCP  We will also refer the patient to bariatric  Also refer the patient for a right hip cortisone and local anesthetic hip injection with the standard orthopedic protocol  Will see this patient back in 3 months for follow-up  She is encouraged to do or with the weight loss  She may use over-the-counter Tylenol or ibuprofen or other anti-inflammatory medication (Voltaren and Aleve show on her medications) but is not to use more than 1 anti-inflammatory medication on an as-needed basis  Ice to the hip as needed for discomfort  Case discussed with Dr Isabel Sawyer  This note was created with voice recognition software

## 2020-07-28 NOTE — PATIENT INSTRUCTIONS
Patient already has referral for physical therapy set up her PCP  We will also refer the patient to bariatric  Also refer the patient for a right hip cortisone and local anesthetic hip injection with the standard orthopedic protocol  Will see this patient back in 3 months for follow-up  She is encouraged to do or with the weight loss  She may use over-the-counter Tylenol or ibuprofen or other anti-inflammatory medication but is not to use more than 1 anti-inflammatory medication on an as-needed basis  Ice to the hip as needed for discomfort

## 2020-08-10 ENCOUNTER — HOSPITAL ENCOUNTER (OUTPATIENT)
Dept: RADIOLOGY | Facility: HOSPITAL | Age: 35
Discharge: HOME | End: 2020-08-10
Admitting: RADIOLOGY
Payer: COMMERCIAL

## 2020-08-10 DIAGNOSIS — M16.0 BILATERAL HIP JOINT ARTHRITIS: ICD-10-CM

## 2020-08-10 PROCEDURE — 77002 NEEDLE LOCALIZATION BY XRAY: CPT

## 2020-08-10 PROCEDURE — 20610 DRAIN/INJ JOINT/BURSA W/O US: CPT

## 2020-08-10 RX ORDER — LIDOCAINE HYDROCHLORIDE 10 MG/ML
5 INJECTION, SOLUTION EPIDURAL; INFILTRATION; INTRACAUDAL; PERINEURAL
Status: COMPLETED | OUTPATIENT
Start: 2020-08-10 | End: 2020-08-10

## 2020-08-10 RX ORDER — METHYLPREDNISOLONE ACETATE 80 MG/ML
80 INJECTION, SUSPENSION INTRA-ARTICULAR; INTRALESIONAL; INTRAMUSCULAR; SOFT TISSUE
Status: COMPLETED | OUTPATIENT
Start: 2020-08-10 | End: 2020-08-10

## 2020-08-10 RX ORDER — BUPIVACAINE HYDROCHLORIDE 2.5 MG/ML
20 INJECTION, SOLUTION EPIDURAL; INFILTRATION; INTRACAUDAL
Status: COMPLETED | OUTPATIENT
Start: 2020-08-10 | End: 2020-08-10

## 2020-08-10 RX ADMIN — BUPIVACAINE HYDROCHLORIDE 2 ML: 2.5 INJECTION, SOLUTION EPIDURAL; INFILTRATION; INTRACAUDAL; PERINEURAL at 11:48

## 2020-08-10 RX ADMIN — METHYLPREDNISOLONE ACETATE 80 MG: 80 INJECTION, SUSPENSION INTRA-ARTICULAR; INTRALESIONAL; INTRAMUSCULAR; SOFT TISSUE at 11:46

## 2020-08-10 RX ADMIN — IOHEXOL 1 ML: 300 INJECTION, SOLUTION INTRAVENOUS at 11:43

## 2020-08-10 RX ADMIN — LIDOCAINE HYDROCHLORIDE 7 ML: 10 INJECTION, SOLUTION EPIDURAL; INFILTRATION; INTRACAUDAL; PERINEURAL at 11:43

## 2020-08-16 ENCOUNTER — HOSPITAL ENCOUNTER (EMERGENCY)
Facility: HOSPITAL | Age: 35
Discharge: HOME | End: 2020-08-16
Attending: EMERGENCY MEDICINE | Admitting: EMERGENCY MEDICINE
Payer: COMMERCIAL

## 2020-08-16 VITALS
HEIGHT: 64 IN | OXYGEN SATURATION: 94 % | HEART RATE: 83 BPM | WEIGHT: 293 LBS | RESPIRATION RATE: 18 BRPM | SYSTOLIC BLOOD PRESSURE: 124 MMHG | DIASTOLIC BLOOD PRESSURE: 60 MMHG | BODY MASS INDEX: 50.02 KG/M2 | TEMPERATURE: 97.4 F

## 2020-08-16 DIAGNOSIS — G43.909 MIGRAINE: Primary | ICD-10-CM

## 2020-08-16 LAB
EXT PREG TEST URINE: NEGATIVE
EXT. CONTROL ED NAV: NORMAL

## 2020-08-16 PROCEDURE — 81025 URINE PREGNANCY TEST: CPT | Performed by: EMERGENCY MEDICINE

## 2020-08-16 PROCEDURE — 99284 EMERGENCY DEPT VISIT MOD MDM: CPT | Performed by: EMERGENCY MEDICINE

## 2020-08-16 PROCEDURE — 96367 TX/PROPH/DG ADDL SEQ IV INF: CPT

## 2020-08-16 PROCEDURE — 96365 THER/PROPH/DIAG IV INF INIT: CPT

## 2020-08-16 PROCEDURE — 96375 TX/PRO/DX INJ NEW DRUG ADDON: CPT

## 2020-08-16 PROCEDURE — 96372 THER/PROPH/DIAG INJ SC/IM: CPT

## 2020-08-16 PROCEDURE — 99284 EMERGENCY DEPT VISIT MOD MDM: CPT

## 2020-08-16 RX ORDER — DIPHENHYDRAMINE HYDROCHLORIDE 50 MG/ML
25 INJECTION INTRAMUSCULAR; INTRAVENOUS ONCE
Status: COMPLETED | OUTPATIENT
Start: 2020-08-16 | End: 2020-08-16

## 2020-08-16 RX ORDER — KETOROLAC TROMETHAMINE 30 MG/ML
15 INJECTION, SOLUTION INTRAMUSCULAR; INTRAVENOUS ONCE
Status: COMPLETED | OUTPATIENT
Start: 2020-08-16 | End: 2020-08-16

## 2020-08-16 RX ORDER — MAGNESIUM SULFATE HEPTAHYDRATE 40 MG/ML
2 INJECTION, SOLUTION INTRAVENOUS ONCE
Status: COMPLETED | OUTPATIENT
Start: 2020-08-16 | End: 2020-08-16

## 2020-08-16 RX ORDER — HALOPERIDOL 5 MG/ML
5 INJECTION INTRAMUSCULAR ONCE
Status: COMPLETED | OUTPATIENT
Start: 2020-08-16 | End: 2020-08-16

## 2020-08-16 RX ADMIN — SODIUM CHLORIDE 1000 ML: 0.9 INJECTION, SOLUTION INTRAVENOUS at 18:24

## 2020-08-16 RX ADMIN — VALPROATE SODIUM 1000 MG: 100 INJECTION, SOLUTION INTRAVENOUS at 19:20

## 2020-08-16 RX ADMIN — KETOROLAC TROMETHAMINE 15 MG: 30 INJECTION, SOLUTION INTRAMUSCULAR at 18:20

## 2020-08-16 RX ADMIN — MAGNESIUM SULFATE IN WATER 2 G: 40 INJECTION, SOLUTION INTRAVENOUS at 18:25

## 2020-08-16 RX ADMIN — DIPHENHYDRAMINE HYDROCHLORIDE 25 MG: 50 INJECTION, SOLUTION INTRAMUSCULAR; INTRAVENOUS at 18:18

## 2020-08-16 RX ADMIN — HALOPERIDOL LACTATE 5 MG: 5 INJECTION, SOLUTION INTRAMUSCULAR at 18:21

## 2020-08-16 NOTE — ED PROVIDER NOTES
History  Chief Complaint   Patient presents with    Migraine     patient reports a generalized migraine for the past 1 1/2 weeks  she reports nausea without vomiting  she has tried advil, aleve, and topamax all without relief  This is a 78-year-old female with a history of migraine who presents with her typical migraine  Patient states that for the past week, she has been suffering from a diffuse headache described as throbbing, constant, waxing and waning in intensity, associated with nausea without vomiting  Headache is made worse with bright lights and loud noises  She has taken Advil, Aleve, and Topamax without relief  States that this feels the same as her previous migraines  Denies fever/chills, vomiting, lightheadedness/dizziness, numbness/weakness, change in vision, URI symptoms, neck pain, chest pain, palpitations, shortness of breath, cough, back pain, flank pain, abdominal pain, diarrhea, hematochezia, melena, dysuria, hematuria, abnormal vaginal discharge/bleeding  Prior to Admission Medications   Prescriptions Last Dose Informant Patient Reported? Taking?    ARIPiprazole (ABILIFY) 10 mg tablet   Yes No   Sig: Take 10 mg by mouth daily   LORazepam (ATIVAN) 1 mg tablet   Yes No   Sig: Take 1 mg by mouth daily at bedtime   Naproxen Sodium (Aleve) 220 MG CAPS   Yes No   Sig: Take by mouth   Nebulizers (NEBULIZER COMPRESSOR) MISC   No No   Sig: Use as directed   albuterol (2 5 mg/3 mL) 0 083 % nebulizer solution   No No   Sig: Take 1 vial (2 5 mg total) by nebulization every 6 (six) hours as needed for wheezing or shortness of breath   albuterol (Ventolin HFA) 90 mcg/act inhaler   No No   Sig: Inhale 2 puffs every 4 (four) hours as needed for wheezing or shortness of breath   atorvastatin (LIPITOR) 10 mg tablet   No No   Sig: Take 1 tablet (10 mg total) by mouth daily   diclofenac sodium (VOLTAREN) 1 %   No No   Sig: Apply 2 g topically 4 (four) times a day   diclofenac sodium (VOLTAREN) 50 mg EC tablet   No No   Sig: Take 1 tablet (50 mg total) by mouth 2 (two) times a day   diphenhydrAMINE (BENADRYL) 25 mg capsule   No No   Sig: Take one with each Reglan (metoclopramide)   Patient not taking: Reported on 6/22/2020   famotidine (PEPCID) 20 mg tablet   No No   Sig: Take 1 tablet (20 mg total) by mouth 2 (two) times a day   methocarbamol (ROBAXIN) 500 mg tablet   No No   Sig: Take 1 tablet (500 mg total) by mouth 2 (two) times a day   montelukast (SINGULAIR) 10 mg tablet   No No   Sig: Take 1 tablet (10 mg total) by mouth daily   topiramate (Topamax) 50 MG tablet   No No   Sig: Take 1 tablet (50 mg total) by mouth 2 (two) times a day   varenicline (Chantix Continuing Month Kenan) 1 mg tablet   No No   Sig: Take 1 tablet (1 mg total) by mouth 2 (two) times a day      Facility-Administered Medications: None       Past Medical History:   Diagnosis Date    Asthma        Past Surgical History:   Procedure Laterality Date    CHOLECYSTECTOMY      FL INJECTION RIGHT HIP (NON ARTHROGRAM)  8/10/2020    TONSILLECTOMY      TUBAL LIGATION         Family History   Problem Relation Age of Onset    Mental illness Mother     Depression Mother     Cancer Mother     Hyperlipidemia Mother     Asthma Father     Arthritis Father     Cancer Brother     Psoriasis Daughter      I have reviewed and agree with the history as documented  E-Cigarette/Vaping    E-Cigarette Use Never User      E-Cigarette/Vaping Substances    Nicotine No     THC No     CBD No     Flavoring No     Other No     Unknown No      Social History     Tobacco Use    Smoking status: Current Some Day Smoker     Packs/day: 0 50     Types: Cigarettes    Smokeless tobacco: Never Used   Substance Use Topics    Alcohol use: Never     Frequency: Never    Drug use: Not Currently       Review of Systems   Constitutional: Negative for chills and fever  HENT: Negative for rhinorrhea, sore throat and trouble swallowing      Eyes: Negative for photophobia and visual disturbance  Respiratory: Negative for cough, chest tightness and shortness of breath  Cardiovascular: Negative for chest pain, palpitations and leg swelling  Gastrointestinal: Positive for nausea  Negative for abdominal pain, blood in stool, diarrhea and vomiting  Endocrine: Negative for polyuria  Genitourinary: Negative for dysuria, flank pain, hematuria, vaginal bleeding and vaginal discharge  Musculoskeletal: Negative for back pain and neck pain  Skin: Negative for color change and rash  Allergic/Immunologic: Negative for immunocompromised state  Neurological: Positive for headaches  Negative for dizziness, weakness, light-headedness and numbness  All other systems reviewed and are negative  Physical Exam  Physical Exam  Constitutional:       General: She is not in acute distress  Appearance: Normal appearance  She is well-developed  She is not ill-appearing or toxic-appearing  HENT:      Head: Normocephalic and atraumatic  Nose: Nose normal       Mouth/Throat:      Pharynx: Uvula midline  Tonsils: No tonsillar exudate  Eyes:      General: Lids are normal       Conjunctiva/sclera: Conjunctivae normal       Pupils: Pupils are equal, round, and reactive to light  Funduscopic exam:     Right eye: No AV nicking or papilledema  Left eye: No AV nicking or papilledema  Neck:      Musculoskeletal: Full passive range of motion without pain, normal range of motion and neck supple  Thyroid: No thyroid mass  Trachea: Trachea normal       Meningeal: Brudzinski's sign and Kernig's sign absent  Cardiovascular:      Rate and Rhythm: Normal rate and regular rhythm  Pulses: Normal pulses  Heart sounds: Normal heart sounds  No murmur  Pulmonary:      Effort: Pulmonary effort is normal       Breath sounds: Normal breath sounds  Abdominal:      General: Bowel sounds are normal       Palpations: Abdomen is soft   Abdomen is not rigid  Tenderness: There is no abdominal tenderness  There is no guarding or rebound  Neurological:      Mental Status: She is alert  Cranial Nerves: No cranial nerve deficit  Sensory: No sensory deficit  Coordination: Coordination normal       Gait: Gait normal       Comments: Cranial nerves 2-12 intact, strength 5/5 throughout, sensation intact throughout  Visual fields normal  Normal finger-to-nose and heel-to-shin  Normal gait  Psychiatric:         Speech: Speech normal          Behavior: Behavior normal  Behavior is cooperative  Thought Content:  Thought content normal          Vital Signs  ED Triage Vitals [08/16/20 1727]   Temperature Pulse Respirations Blood Pressure SpO2   (!) 97 4 °F (36 3 °C) 86 16 132/77 97 %      Temp Source Heart Rate Source Patient Position - Orthostatic VS BP Location FiO2 (%)   Temporal Monitor Sitting Left arm --      Pain Score       9           Vitals:    08/16/20 1727 08/16/20 1730   BP: 132/77 123/60   Pulse: 86 90   Patient Position - Orthostatic VS: Sitting Sitting         Visual Acuity      ED Medications  Medications   sodium chloride 0 9 % bolus 1,000 mL (1,000 mL Intravenous New Bag 8/16/20 1824)   magnesium sulfate 2 g/50 mL IVPB (premix) 2 g (2 g Intravenous New Bag 8/16/20 1825)   valproate (DEPACON) 1,000 mg in sodium chloride 0 9 % 50 mL for headache (has no administration in time range)   diphenhydrAMINE (BENADRYL) injection 25 mg (25 mg Intravenous Given 8/16/20 1818)   ketorolac (TORADOL) injection 15 mg (15 mg Intravenous Given 8/16/20 1820)   haloperidol lactate (HALDOL) injection 5 mg (5 mg Intramuscular Given 8/16/20 1821)       Diagnostic Studies  Results Reviewed     Procedure Component Value Units Date/Time    POCT pregnancy, urine [[id number]]  (Normal) Resulted:  08/16/20 1800    Lab Status:  Final result Specimen:  Urine Updated:  08/16/20 1800     EXT PREG TEST UR (Ref: Negative) Negative     Control Valid No orders to display              Procedures  Procedures         ED Course       US AUDIT      Most Recent Value   Initial Alcohol Screen: US AUDIT-C    1  How often do you have a drink containing alcohol?  0 Filed at: 08/16/2020 1728   2  How many drinks containing alcohol do you have on a typical day you are drinking? 0 Filed at: 08/16/2020 1728   3a  Male UNDER 65: How often do you have five or more drinks on one occasion? 0 Filed at: 08/16/2020 1728   3b  FEMALE Any Age, or MALE 65+: How often do you have 4 or more drinks on one occassion? 0 Filed at: 08/16/2020 1728   Audit-C Score  0 Filed at: 08/16/2020 1728                  YINKA/DAST-10      Most Recent Value   How many times in the past year have you    Used an illegal drug or used a prescription medication for non-medical reasons? Never Filed at: 08/16/2020 1729                                [facility]  Number of Diagnoses or Management Options  Diagnosis management comments: Plan to treat the patient symptomatically  Plan to re-eval and discharge if feeling better  Disposition  Final diagnoses:   Migraine     Time reflects when diagnosis was documented in both MDM as applicable and the Disposition within this note     Time User Action Codes Description Comment    8/16/2020  6:22 PM Fabby Thayer Add [S19 [id number]] Migraine       ED Disposition     ED Disposition Condition Date/Time Comment    Discharge Stable Sun Aug 16, 2020  6:22 PM Catalina Rivera discharge to home/self care              Follow-up Information     Follow up With Specialties Details Why Contact Info Additional [address], [place] Family Medicine Schedule an appointment as soon as possible for a visit    O  [address]       [facility] Emergency Department Emergency Medicine Go to  If symptoms worsen Asif Gautam [phone]  [phone] [place] ED, [address], [address]          Patient's Medications   Discharge Prescriptions    No medications on file     No discharge procedures on file      PDMP Review     None          ED Provider  Electronically Signed by           Diallo Goode MD  08/16/20 8496

## 2020-08-17 DIAGNOSIS — K21.9 GASTROESOPHAGEAL REFLUX DISEASE, ESOPHAGITIS PRESENCE NOT SPECIFIED: ICD-10-CM

## 2020-08-17 RX ORDER — FAMOTIDINE 20 MG/1
20 TABLET, FILM COATED ORAL 2 TIMES DAILY
Qty: 60 TABLET | Refills: 2 | Status: SHIPPED | OUTPATIENT
Start: 2020-08-17

## 2020-09-02 ENCOUNTER — APPOINTMENT (EMERGENCY)
Dept: RADIOLOGY | Facility: HOSPITAL | Age: 35
End: 2020-09-02
Payer: COMMERCIAL

## 2020-09-02 ENCOUNTER — HOSPITAL ENCOUNTER (EMERGENCY)
Facility: HOSPITAL | Age: 35
Discharge: HOME | End: 2020-09-02
Attending: EMERGENCY MEDICINE
Payer: COMMERCIAL

## 2020-09-02 VITALS
WEIGHT: 293 LBS | BODY MASS INDEX: 50.02 KG/M2 | DIASTOLIC BLOOD PRESSURE: 89 MMHG | HEIGHT: 64 IN | HEART RATE: 106 BPM | SYSTOLIC BLOOD PRESSURE: 139 MMHG | RESPIRATION RATE: 18 BRPM | OXYGEN SATURATION: 100 % | TEMPERATURE: 98.2 F

## 2020-09-02 DIAGNOSIS — S93.402A LEFT ANKLE SPRAIN: Primary | ICD-10-CM

## 2020-09-02 PROCEDURE — 99283 EMERGENCY DEPT VISIT LOW MDM: CPT

## 2020-09-02 PROCEDURE — 99283 EMERGENCY DEPT VISIT LOW MDM: CPT | Performed by: EMERGENCY MEDICINE

## 2020-09-02 PROCEDURE — 73610 X-RAY EXAM OF ANKLE: CPT

## 2020-09-02 NOTE — ED PROVIDER NOTES
History  Chief Complaint   Patient presents with    Ankle Pain     c/o left lateral ankle pain and swelling x2 days  denies injury          67-year-old female with past medical history of morbid obesity, migraine, and asthma who is presenting with left ankle pain  Patient states that her symptoms began 2 days ago  She is not aware of any specific injuries but she states that she is clumsy and might have twisted her ankle without realizing it  She noticed diffuse ankle swelling and pain starting 2 days ago  The swelling has improved but the pain is persistent  It is located primarily along the anterolateral left ankle  Patient denies any numbness, tingling, or weakness of her left foot  No foot pain or calf pain  No other complaints on review of systems  She has been taking ibuprofen and Tylenol with some relief of her symptoms  Last doses were this morning  Prior to Admission Medications   Prescriptions Last Dose Informant Patient Reported? Taking?    ARIPiprazole (ABILIFY) 10 mg tablet   Yes No   Sig: Take 10 mg by mouth daily   LORazepam (ATIVAN) 1 mg tablet   Yes No   Sig: Take 1 mg by mouth daily at bedtime   Naproxen Sodium (Aleve) 220 MG CAPS   Yes No   Sig: Take by mouth   Nebulizers (NEBULIZER COMPRESSOR) MISC   No No   Sig: Use as directed   albuterol (2 5 mg/3 mL) 0 083 % nebulizer solution   No No   Sig: Take 1 vial (2 5 mg total) by nebulization every 6 (six) hours as needed for wheezing or shortness of breath   albuterol (Ventolin HFA) 90 mcg/act inhaler   No No   Sig: Inhale 2 puffs every 4 (four) hours as needed for wheezing or shortness of breath   atorvastatin (LIPITOR) 10 mg tablet   No No   Sig: Take 1 tablet (10 mg total) by mouth daily   diclofenac sodium (VOLTAREN) 1 %   No No   Sig: Apply 2 g topically 4 (four) times a day   diclofenac sodium (VOLTAREN) 50 mg EC tablet   No No   Sig: Take 1 tablet (50 mg total) by mouth 2 (two) times a day   diphenhydrAMINE (BENADRYL) 25 mg capsule   No No   Sig: Take one with each Reglan (metoclopramide)   Patient not taking: Reported on 6/22/2020   famotidine (PEPCID) 20 mg tablet   No No   Sig: Take 1 tablet (20 mg total) by mouth 2 (two) times a day   methocarbamol (ROBAXIN) 500 mg tablet   No No   Sig: Take 1 tablet (500 mg total) by mouth 2 (two) times a day   montelukast (SINGULAIR) 10 mg tablet   No No   Sig: Take 1 tablet (10 mg total) by mouth daily   topiramate (Topamax) 50 MG tablet   No No   Sig: Take 1 tablet (50 mg total) by mouth 2 (two) times a day   varenicline (Chantix Continuing Month Kenan) 1 mg tablet   No No   Sig: Take 1 tablet (1 mg total) by mouth 2 (two) times a day      Facility-Administered Medications: None       Past Medical History:   Diagnosis Date    Asthma        Past Surgical History:   Procedure Laterality Date    CHOLECYSTECTOMY      FL INJECTION RIGHT HIP (NON ARTHROGRAM)  8/10/2020    TONSILLECTOMY      TUBAL LIGATION         Family History   Problem Relation Age of Onset    Mental illness Mother     Depression Mother     Cancer Mother     Hyperlipidemia Mother     Asthma Father     Arthritis Father     Cancer Brother     Psoriasis Daughter      I have reviewed and agree with the history as documented  E-Cigarette/Vaping    E-Cigarette Use Never User      E-Cigarette/Vaping Substances    Nicotine No     THC No     CBD No     Flavoring No     Other No     Unknown No      Social History     Tobacco Use    Smoking status: Current Some Day Smoker     Packs/day: 0 50     Types: Cigarettes    Smokeless tobacco: Never Used   Substance Use Topics    Alcohol use: Never     Frequency: Never    Drug use: Not Currently       Review of Systems   Constitutional: Negative for diaphoresis, fever and unexpected weight change  HENT: Negative for congestion, rhinorrhea and sore throat  Eyes: Negative for pain, discharge and visual disturbance     Respiratory: Negative for cough, shortness of breath and wheezing  Cardiovascular: Negative for chest pain, palpitations and leg swelling  Gastrointestinal: Negative for abdominal pain, blood in stool, constipation, diarrhea, nausea and vomiting  Genitourinary: Negative for dysuria, flank pain and hematuria  Musculoskeletal: Positive for arthralgias (left ankle)  Negative for joint swelling  Skin: Negative for rash and wound  Allergic/Immunologic: Negative for environmental allergies and food allergies  Neurological: Negative for dizziness, seizures, weakness and numbness  Hematological: Negative for adenopathy  Psychiatric/Behavioral: Negative for confusion and hallucinations  Physical Exam  Physical Exam  Vitals signs and nursing note reviewed  Constitutional:       General: She is not in acute distress  Appearance: She is well-developed  She is obese  HENT:      Head: Normocephalic and atraumatic  Right Ear: External ear normal       Left Ear: External ear normal    Eyes:      Conjunctiva/sclera: Conjunctivae normal       Pupils: Pupils are equal, round, and reactive to light  Pulmonary:      Effort: Pulmonary effort is normal  No respiratory distress  Musculoskeletal: Normal range of motion  General: Tenderness present  No deformity  Comments: Tenderness to palpation of the anterolateral left ankle  No deformity  No ecchymosis  DP pulse palpable and 2+ in the left foot  No foot tenderness, calf tenderness, or proximal fibular tenderness  Skin:     General: Skin is warm and dry  Neurological:      Mental Status: She is alert and oriented to person, place, and time  Comments: No gross motor deficits noted  Cranial nerves II-XII are intact  Speech is fluent without dysarthria or aphasia     Psychiatric:         Mood and Affect: Mood normal          Behavior: Behavior normal          Vital Signs  ED Triage Vitals [09/02/20 1951]   Temperature Pulse Respirations Blood Pressure SpO2   98 2 °F (36 8 °C) (!) 106 18 139/89 100 %      Temp Source Heart Rate Source Patient Position - Orthostatic VS BP Location FiO2 (%)   Temporal Monitor Lying Left arm --      Pain Score       --           Vitals:    09/02/20 1951 09/02/20 2044   BP: 139/89 139/89   Pulse: (!) 106 (!) 106   Patient Position - Orthostatic VS: Lying Sitting         Visual Acuity      ED Medications  Medications - No data to display    Diagnostic Studies  Results Reviewed     None                 XR ankle 3+ views LEFT   ED Interpretation by Priyank Mares MD (09/02 2028)   X-ray interpreted by me, formal Radiology read to follow  No fracture or dislocation  Procedures  Procedures         ED Course       US AUDIT      Most Recent Value   Initial Alcohol Screen: US AUDIT-C    1  How often do you have a drink containing alcohol?  0 Filed at: 09/02/2020 1952   2  How many drinks containing alcohol do you have on a typical day you are drinking? 0 Filed at: 09/02/2020 1952   3b  FEMALE Any Age, or MALE 65+: How often do you have 4 or more drinks on one occassion? 0 Filed at: 09/02/2020 1952   Audit-C Score  0 Filed at: 09/02/2020 1952                                            [facility]  Number of Diagnoses or Management Options  Left ankle sprain: new and requires workup  Diagnosis management comments:     Patient presented with left ankle pain and swelling as detailed above  Physical examination demonstrated anterolateral left ankle tenderness  No ecchymosis or deformity  Left foot neurovascularly intact  History and examination consistent with left ankle sprain  X-ray was ordered and did not demonstrate any fracture or dislocation  Patient was given an Aircast   She was advised to continue with conservative treatment  Return precautions were discussed  Patient verbalized understanding         Amount and/or Complexity of Data Reviewed  Tests in the radiology section of CPT®: ordered and reviewed  Decide to obtain previous medical records or to obtain history from someone other than the patient: yes  Review and summarize past medical records: yes  Independent visualization of images, tracings, or specimens: yes    Risk of Complications, Morbidity, and/or Mortality  Presenting problems: low  Diagnostic procedures: minimal  Management options: minimal    Patient Progress  Patient progress: stable        Disposition  Final diagnoses:   Left ankle sprain     Time reflects when diagnosis was documented in both MDM as applicable and the Disposition within this note     Time User Action Codes Description Comment    9/2/2020  8:29 PM Jonny Olea Add [K90 726I] Left ankle sprain       ED Disposition     ED Disposition Condition Date/Time Comment    Discharge Good Wed Sep 2, 2020  8:29 PM Chiquita August discharge to home/self care  Follow-up Information     Follow up With Specialties Details Why Contact Info Additional [address], [place] Family Medicine Call  As needed  [address]  [facility] [phone]       [facility] Emergency Department Emergency Medicine Go to  If symptoms worsen   [place] [id number]  [phone] [place] ED, [address], [address] PA-C Family Medicine   [facility] [place] [address]  [phone]             Discharge Medication List as of 9/2/2020  8:30 PM      CONTINUE these medications which have NOT CHANGED    Details   albuterol (2 5 mg/3 mL) 0 083 % nebulizer solution Take 1 vial (2 5 mg total) by nebulization every 6 (six) hours as needed for wheezing or shortness of breath, Starting Thu 6/11/2020, Normal      albuterol (Ventolin HFA) 90 mcg/act inhaler Inhale 2 puffs every 4 (four) hours as needed for wheezing or shortness of breath, Starting Thu 6/11/2020, Normal      ARIPiprazole (ABILIFY) 10 mg tablet Take 10 mg by mouth daily, Historical Med      atorvastatin (LIPITOR) 10 mg tablet Take 1 tablet (10 mg total) by mouth daily, Starting Mon 6/22/2020, Normal      diclofenac sodium (VOLTAREN) 1 % Apply 2 g topically 4 (four) times a day, Starting Thu 6/11/2020, Normal      diclofenac sodium (VOLTAREN) 50 mg EC tablet Take 1 tablet (50 mg total) by mouth 2 (two) times a day, Starting Tue 7/21/2020, Normal      diphenhydrAMINE (BENADRYL) 25 mg capsule Take one with each Reglan (metoclopramide), Normal      famotidine (PEPCID) 20 mg tablet Take 1 tablet (20 mg total) by mouth 2 (two) times a day, Starting Mon 8/17/2020, Normal      LORazepam (ATIVAN) 1 mg tablet Take 1 mg by mouth daily at bedtime, Historical Med      methocarbamol (ROBAXIN) 500 mg tablet Take 1 tablet (500 mg total) by mouth 2 (two) times a day, Starting Tue 7/21/2020, Normal      montelukast (SINGULAIR) 10 mg tablet Take 1 tablet (10 mg total) by mouth daily, Starting Thu 6/11/2020, Normal      Naproxen Sodium (Aleve) 220 MG CAPS Take by mouth, Historical Med      Nebulizers (NEBULIZER COMPRESSOR) MISC Use as directed, Normal      topiramate (Topamax) 50 MG tablet Take 1 tablet (50 mg total) by mouth 2 (two) times a day, Starting Thu 6/11/2020, Normal      varenicline (Chantix Continuing Month Kenan) 1 mg tablet Take 1 tablet (1 mg total) by mouth 2 (two) times a day, Starting Tue 7/21/2020, Normal           No discharge procedures on file      PDMP Review     None          ED Provider  Electronically Signed by           Kelly Pepper MD  09/02/20 3940

## 2020-09-30 DIAGNOSIS — E78.00 PURE HYPERCHOLESTEROLEMIA: ICD-10-CM

## 2020-09-30 RX ORDER — ATORVASTATIN CALCIUM 10 MG/1
10 TABLET, FILM COATED ORAL DAILY
Qty: 30 TABLET | Refills: 5 | Status: SHIPPED | OUTPATIENT
Start: 2020-09-30

## 2020-10-27 ENCOUNTER — OFFICE VISIT (OUTPATIENT)
Dept: FAMILY MEDICINE CLINIC | Facility: CLINIC | Age: 35
End: 2020-10-27
Payer: COMMERCIAL

## 2020-10-27 VITALS
TEMPERATURE: 98.3 F | OXYGEN SATURATION: 98 % | WEIGHT: 293 LBS | BODY MASS INDEX: 54.28 KG/M2 | HEART RATE: 100 BPM | DIASTOLIC BLOOD PRESSURE: 90 MMHG | RESPIRATION RATE: 16 BRPM | SYSTOLIC BLOOD PRESSURE: 124 MMHG

## 2020-10-27 DIAGNOSIS — G43.009 MIGRAINE WITHOUT AURA AND WITHOUT STATUS MIGRAINOSUS, NOT INTRACTABLE: Primary | ICD-10-CM

## 2020-10-27 DIAGNOSIS — G89.29 CHRONIC LOW BACK PAIN WITHOUT SCIATICA, UNSPECIFIED BACK PAIN LATERALITY: ICD-10-CM

## 2020-10-27 DIAGNOSIS — M54.50 CHRONIC LOW BACK PAIN WITHOUT SCIATICA, UNSPECIFIED BACK PAIN LATERALITY: ICD-10-CM

## 2020-10-27 PROCEDURE — T1015 CLINIC SERVICE: HCPCS | Performed by: FAMILY MEDICINE

## 2020-10-27 PROCEDURE — 99213 OFFICE O/P EST LOW 20 MIN: CPT | Performed by: FAMILY MEDICINE

## 2020-10-27 RX ORDER — SUMATRIPTAN 50 MG/1
50 TABLET, FILM COATED ORAL ONCE AS NEEDED
Qty: 9 TABLET | Refills: 0 | Status: SHIPPED | OUTPATIENT
Start: 2020-10-27 | End: 2022-07-26 | Stop reason: SDUPTHER

## 2020-10-28 ENCOUNTER — OFFICE VISIT (OUTPATIENT)
Dept: OBGYN CLINIC | Facility: CLINIC | Age: 35
End: 2020-10-28
Payer: COMMERCIAL

## 2020-10-28 VITALS
BODY MASS INDEX: 48.82 KG/M2 | WEIGHT: 293 LBS | HEART RATE: 88 BPM | HEIGHT: 65 IN | TEMPERATURE: 97.8 F | SYSTOLIC BLOOD PRESSURE: 107 MMHG | DIASTOLIC BLOOD PRESSURE: 72 MMHG

## 2020-10-28 DIAGNOSIS — E66.01 CLASS 3 SEVERE OBESITY WITH BODY MASS INDEX (BMI) OF 50.0 TO 59.9 IN ADULT, UNSPECIFIED OBESITY TYPE, UNSPECIFIED WHETHER SERIOUS COMORBIDITY PRESENT (HCC): ICD-10-CM

## 2020-10-28 DIAGNOSIS — M16.0 BILATERAL HIP JOINT ARTHRITIS: Primary | ICD-10-CM

## 2020-10-28 PROCEDURE — 99213 OFFICE O/P EST LOW 20 MIN: CPT | Performed by: ORTHOPAEDIC SURGERY

## 2020-11-10 ENCOUNTER — CONSULT (OUTPATIENT)
Dept: PAIN MEDICINE | Facility: CLINIC | Age: 35
End: 2020-11-10
Payer: COMMERCIAL

## 2020-11-10 VITALS
HEIGHT: 65 IN | BODY MASS INDEX: 48.82 KG/M2 | SYSTOLIC BLOOD PRESSURE: 124 MMHG | WEIGHT: 293 LBS | DIASTOLIC BLOOD PRESSURE: 78 MMHG | TEMPERATURE: 97.4 F

## 2020-11-10 DIAGNOSIS — Z79.891 LONG-TERM CURRENT USE OF OPIATE ANALGESIC: ICD-10-CM

## 2020-11-10 DIAGNOSIS — G89.4 CHRONIC PAIN SYNDROME: ICD-10-CM

## 2020-11-10 DIAGNOSIS — M16.0 BILATERAL HIP JOINT ARTHRITIS: ICD-10-CM

## 2020-11-10 DIAGNOSIS — S73.191D TEAR OF RIGHT ACETABULAR LABRUM, SUBSEQUENT ENCOUNTER: Primary | ICD-10-CM

## 2020-11-10 DIAGNOSIS — F11.20 UNCOMPLICATED OPIOID DEPENDENCE (HCC): ICD-10-CM

## 2020-11-10 PROCEDURE — 3008F BODY MASS INDEX DOCD: CPT | Performed by: ANESTHESIOLOGY

## 2020-11-10 PROCEDURE — 99244 OFF/OP CNSLTJ NEW/EST MOD 40: CPT | Performed by: ANESTHESIOLOGY

## 2020-11-11 ENCOUNTER — HOSPITAL ENCOUNTER (EMERGENCY)
Facility: HOSPITAL | Age: 35
Discharge: HOME | End: 2020-11-11
Attending: EMERGENCY MEDICINE | Admitting: EMERGENCY MEDICINE
Payer: COMMERCIAL

## 2020-11-11 ENCOUNTER — TRANSCRIBE ORDERS (OUTPATIENT)
Dept: ADMINISTRATIVE | Facility: HOSPITAL | Age: 35
End: 2020-11-11

## 2020-11-11 ENCOUNTER — APPOINTMENT (EMERGENCY)
Dept: CT IMAGING | Facility: HOSPITAL | Age: 35
End: 2020-11-11
Payer: COMMERCIAL

## 2020-11-11 VITALS
BODY MASS INDEX: 53.6 KG/M2 | WEIGHT: 293 LBS | RESPIRATION RATE: 16 BRPM | TEMPERATURE: 97 F | HEART RATE: 90 BPM | SYSTOLIC BLOOD PRESSURE: 104 MMHG | OXYGEN SATURATION: 95 % | DIASTOLIC BLOOD PRESSURE: 59 MMHG

## 2020-11-11 DIAGNOSIS — G43.009 MIGRAINE WITHOUT AURA AND WITHOUT STATUS MIGRAINOSUS, NOT INTRACTABLE: Primary | ICD-10-CM

## 2020-11-11 DIAGNOSIS — S73.191S TEAR OF ACETABULAR LABRUM, RIGHT, SEQUELA: Primary | ICD-10-CM

## 2020-11-11 DIAGNOSIS — M16.0 BILATERAL PRIMARY OSTEOARTHRITIS OF HIP: ICD-10-CM

## 2020-11-11 LAB
ALBUMIN SERPL BCP-MCNC: 3.4 G/DL (ref 3.5–5)
ALP SERPL-CCNC: 93 U/L (ref 46–116)
ALT SERPL W P-5'-P-CCNC: 59 U/L (ref 12–78)
ANION GAP SERPL CALCULATED.3IONS-SCNC: 10 MMOL/L (ref 4–13)
APTT PPP: 32 SECONDS (ref 23–37)
AST SERPL W P-5'-P-CCNC: 37 U/L (ref 5–45)
BASOPHILS # BLD AUTO: 0.05 THOUSANDS/ΜL (ref 0–0.1)
BASOPHILS NFR BLD AUTO: 1 % (ref 0–1)
BILIRUB SERPL-MCNC: 0.4 MG/DL (ref 0.2–1)
BILIRUB UR QL STRIP: NEGATIVE
BUN SERPL-MCNC: 5 MG/DL (ref 5–25)
CALCIUM ALBUM COR SERPL-MCNC: 9.7 MG/DL (ref 8.3–10.1)
CALCIUM SERPL-MCNC: 9.2 MG/DL (ref 8.3–10.1)
CHLORIDE SERPL-SCNC: 102 MMOL/L (ref 100–108)
CLARITY UR: CLEAR
CO2 SERPL-SCNC: 27 MMOL/L (ref 21–32)
COLOR UR: YELLOW
CREAT SERPL-MCNC: 0.79 MG/DL (ref 0.6–1.3)
EOSINOPHIL # BLD AUTO: 0.27 THOUSAND/ΜL (ref 0–0.61)
EOSINOPHIL NFR BLD AUTO: 3 % (ref 0–6)
ERYTHROCYTE [DISTWIDTH] IN BLOOD BY AUTOMATED COUNT: 13.2 % (ref 11.6–15.1)
EXT PREG TEST URINE: NEGATIVE
EXT. CONTROL ED NAV: NORMAL
GFR SERPL CREATININE-BSD FRML MDRD: 98 ML/MIN/1.73SQ M
GLUCOSE SERPL-MCNC: 140 MG/DL (ref 65–140)
GLUCOSE UR STRIP-MCNC: NEGATIVE MG/DL
HCT VFR BLD AUTO: 43.7 % (ref 34.8–46.1)
HGB BLD-MCNC: 14.7 G/DL (ref 11.5–15.4)
HGB UR QL STRIP.AUTO: NEGATIVE
IMM GRANULOCYTES # BLD AUTO: 0.08 THOUSAND/UL (ref 0–0.2)
IMM GRANULOCYTES NFR BLD AUTO: 1 % (ref 0–2)
INR PPP: 0.96 (ref 0.84–1.19)
KETONES UR STRIP-MCNC: NEGATIVE MG/DL
LEUKOCYTE ESTERASE UR QL STRIP: NEGATIVE
LYMPHOCYTES # BLD AUTO: 2.93 THOUSANDS/ΜL (ref 0.6–4.47)
LYMPHOCYTES NFR BLD AUTO: 30 % (ref 14–44)
MAGNESIUM SERPL-MCNC: 2 MG/DL (ref 1.6–2.6)
MCH RBC QN AUTO: 29.5 PG (ref 26.8–34.3)
MCHC RBC AUTO-ENTMCNC: 33.6 G/DL (ref 31.4–37.4)
MCV RBC AUTO: 88 FL (ref 82–98)
MONOCYTES # BLD AUTO: 0.4 THOUSAND/ΜL (ref 0.17–1.22)
MONOCYTES NFR BLD AUTO: 4 % (ref 4–12)
NEUTROPHILS # BLD AUTO: 6 THOUSANDS/ΜL (ref 1.85–7.62)
NEUTS SEG NFR BLD AUTO: 61 % (ref 43–75)
NITRITE UR QL STRIP: NEGATIVE
NRBC BLD AUTO-RTO: 0 /100 WBCS
PH UR STRIP.AUTO: 6 [PH]
PLATELET # BLD AUTO: 280 THOUSANDS/UL (ref 149–390)
PMV BLD AUTO: 9.1 FL (ref 8.9–12.7)
POTASSIUM SERPL-SCNC: 3.1 MMOL/L (ref 3.5–5.3)
PROT SERPL-MCNC: 7.6 G/DL (ref 6.4–8.2)
PROT UR STRIP-MCNC: NEGATIVE MG/DL
PROTHROMBIN TIME: 12.6 SECONDS (ref 11.6–14.5)
RBC # BLD AUTO: 4.99 MILLION/UL (ref 3.81–5.12)
SODIUM SERPL-SCNC: 139 MMOL/L (ref 136–145)
SP GR UR STRIP.AUTO: 1.02 (ref 1–1.03)
UROBILINOGEN UR QL STRIP.AUTO: 0.2 E.U./DL
WBC # BLD AUTO: 9.73 THOUSAND/UL (ref 4.31–10.16)

## 2020-11-11 PROCEDURE — 99284 EMERGENCY DEPT VISIT MOD MDM: CPT | Performed by: EMERGENCY MEDICINE

## 2020-11-11 PROCEDURE — 36415 COLL VENOUS BLD VENIPUNCTURE: CPT | Performed by: EMERGENCY MEDICINE

## 2020-11-11 PROCEDURE — 85730 THROMBOPLASTIN TIME PARTIAL: CPT | Performed by: EMERGENCY MEDICINE

## 2020-11-11 PROCEDURE — 96361 HYDRATE IV INFUSION ADD-ON: CPT

## 2020-11-11 PROCEDURE — 85610 PROTHROMBIN TIME: CPT | Performed by: EMERGENCY MEDICINE

## 2020-11-11 PROCEDURE — 70450 CT HEAD/BRAIN W/O DYE: CPT

## 2020-11-11 PROCEDURE — 81025 URINE PREGNANCY TEST: CPT | Performed by: EMERGENCY MEDICINE

## 2020-11-11 PROCEDURE — 96374 THER/PROPH/DIAG INJ IV PUSH: CPT

## 2020-11-11 PROCEDURE — 81003 URINALYSIS AUTO W/O SCOPE: CPT | Performed by: EMERGENCY MEDICINE

## 2020-11-11 PROCEDURE — G1004 CDSM NDSC: HCPCS

## 2020-11-11 PROCEDURE — 80053 COMPREHEN METABOLIC PANEL: CPT | Performed by: EMERGENCY MEDICINE

## 2020-11-11 PROCEDURE — 85025 COMPLETE CBC W/AUTO DIFF WBC: CPT | Performed by: EMERGENCY MEDICINE

## 2020-11-11 PROCEDURE — 96375 TX/PRO/DX INJ NEW DRUG ADDON: CPT

## 2020-11-11 PROCEDURE — 99284 EMERGENCY DEPT VISIT MOD MDM: CPT

## 2020-11-11 PROCEDURE — 83735 ASSAY OF MAGNESIUM: CPT | Performed by: EMERGENCY MEDICINE

## 2020-11-11 RX ORDER — METOCLOPRAMIDE 10 MG/1
10 TABLET ORAL EVERY 6 HOURS PRN
Qty: 30 TABLET | Refills: 0 | Status: SHIPPED | OUTPATIENT
Start: 2020-11-11

## 2020-11-11 RX ORDER — NAPROXEN 500 MG/1
500 TABLET ORAL 2 TIMES DAILY WITH MEALS
Qty: 10 TABLET | Refills: 0 | Status: SHIPPED | OUTPATIENT
Start: 2020-11-11 | End: 2020-12-08

## 2020-11-11 RX ORDER — LORAZEPAM 2 MG/ML
2 INJECTION INTRAMUSCULAR ONCE
Status: COMPLETED | OUTPATIENT
Start: 2020-11-11 | End: 2020-11-11

## 2020-11-11 RX ORDER — KETOROLAC TROMETHAMINE 30 MG/ML
30 INJECTION, SOLUTION INTRAMUSCULAR; INTRAVENOUS ONCE
Status: COMPLETED | OUTPATIENT
Start: 2020-11-11 | End: 2020-11-11

## 2020-11-11 RX ORDER — METOCLOPRAMIDE HYDROCHLORIDE 5 MG/ML
10 INJECTION INTRAMUSCULAR; INTRAVENOUS ONCE
Status: COMPLETED | OUTPATIENT
Start: 2020-11-11 | End: 2020-11-11

## 2020-11-11 RX ADMIN — SODIUM CHLORIDE 1000 ML: 0.9 INJECTION, SOLUTION INTRAVENOUS at 18:31

## 2020-11-11 RX ADMIN — LORAZEPAM 2 MG: 2 INJECTION INTRAMUSCULAR; INTRAVENOUS at 18:39

## 2020-11-11 RX ADMIN — KETOROLAC TROMETHAMINE 30 MG: 30 INJECTION, SOLUTION INTRAMUSCULAR at 18:39

## 2020-11-11 RX ADMIN — METOCLOPRAMIDE HYDROCHLORIDE 10 MG: 5 INJECTION INTRAMUSCULAR; INTRAVENOUS at 18:39

## 2020-11-12 LAB
6MAM UR QL CFM: NEGATIVE NG/ML
7AMINOCLONAZEPAM UR QL CFM: NEGATIVE NG/ML
A-OH ALPRAZ UR QL CFM: NEGATIVE NG/ML
ACCEPTABLE CREAT UR QL: NORMAL MG/DL
ACCEPTIBLE SP GR UR QL: NORMAL
AMITRIP UR QL CFM: NEGATIVE NG/ML
AMPHET UR QL CFM: NEGATIVE NG/ML
AMPHET UR QL CFM: NEGATIVE NG/ML
BENZODIAZ UR QL SCN: NORMAL
BUPRENORPHINE UR QL CFM: NEGATIVE NG/ML
BUTALBITAL UR QL CFM: NEGATIVE NG/ML
BZE UR QL CFM: NEGATIVE NG/ML
CARISOPRODOL UR QL CFM: NEGATIVE NG/ML
CODEINE UR QL CFM: NEGATIVE NG/ML
DESIPRAMINE UR QL CFM: NEGATIVE NG/ML
EDDP UR QL CFM: NEGATIVE NG/ML
FENTANYL UR QL CFM: NEGATIVE NG/ML
GLIADIN IGG SER IA-ACNC: NEGATIVE NG/ML
GLUCOSE 30M P 50 G LAC PO SERPL-MCNC: NORMAL NG/ML
HYDROCODONE UR QL CFM: NEGATIVE NG/ML
HYDROCODONE UR QL CFM: NEGATIVE NG/ML
HYDROMORPHONE UR QL CFM: NEGATIVE NG/ML
LORAZEPAM UR QL CFM: NORMAL NG/ML
MDMA UR QL CFM: NEGATIVE NG/ML
MEPROBAMATE UR QL CFM: NEGATIVE NG/ML
METHADONE UR QL CFM: NEGATIVE NG/ML
METHAMPHET UR QL CFM: NEGATIVE NG/ML
MORPHINE UR QL CFM: NEGATIVE NG/ML
MORPHINE UR QL CFM: NEGATIVE NG/ML
NALTREXONE UR QL CFM: NEGATIVE NG/ML
NITRITE UR QL: NORMAL UG/ML
NORBUPRENORPHINE UR QL CFM: NEGATIVE NG/ML
NORDIAZEPAM UR QL CFM: NEGATIVE NG/ML
NORFENTANYL UR QL CFM: NEGATIVE NG/ML
NORHYDROCODONE UR QL CFM: NEGATIVE NG/ML
NORHYDROCODONE UR QL CFM: NEGATIVE NG/ML
NOROXYCODONE UR QL CFM: NEGATIVE NG/ML
NORTRIP UR QL CFM: NEGATIVE NG/ML
OPC-3373 QUANTIFICATION: NORMAL
OXAZEPAM UR QL CFM: NEGATIVE NG/ML
OXYCODONE UR QL CFM: NEGATIVE NG/ML
OXYMORPHONE UR QL CFM: NEGATIVE NG/ML
OXYMORPHONE UR QL CFM: NEGATIVE NG/ML
PCP UR QL CFM: NEGATIVE NG/ML
PHENOBARB UR QL CFM: NEGATIVE NG/ML
RESULT ALL_PRESCRIBED MEDS AND SPECIAL INSTRUCTIONS: NORMAL
SECOBARBITAL UR QL CFM: NEGATIVE NG/ML
SL AMB 3-METHYL-FENTANYL QUANTIFICATION: NORMAL NG/ML
SL AMB 4-ANPP QUANTIFICATION: NORMAL NG/ML
SL AMB 4-FIBF QUANTIFICATION: NORMAL NG/ML
SL AMB 7-OH-MITRAGYNINE (KRATOM ALKALOID) QUANTIFICATION: NEGATIVE NG/ML
SL AMB ACETYL FENTANYL QUANTIFICATION: NORMAL NG/ML
SL AMB ACETYL NORFENTANYL QUANTIFICATION: NORMAL NG/ML
SL AMB ACRYL FENTANYL QUANTIFICATION: NORMAL NG/ML
SL AMB BUTRYL FENTANYL QUANTIFICATION: NORMAL NG/ML
SL AMB CARFENTANIL QUANTIFICATION: NORMAL NG/ML
SL AMB CLOZAPINE QUANTIFICATION: NEGATIVE NG/ML
SL AMB CTHC (MARIJUANA METABOLITE) QUANTIFICATION: NEGATIVE NG/ML
SL AMB CYCLOPROPYL FENTANYL QUANTIFICATION: NORMAL NG/ML
SL AMB FURANYL FENTANYL QUANTIFICATION: NORMAL NG/ML
SL AMB HALOPERIDOL  QUANTIFICATION: NEGATIVE NG/ML
SL AMB HALOPERIDOL METABOLITE QUANTIFICATION: NEGATIVE NG/ML
SL AMB HYDROXYBUPROPION QUANTIFICATION: NEGATIVE NG/ML
SL AMB HYDROXYRISPERIDONE QUANTIFICATION: NEGATIVE NG/ML
SL AMB METHOXYACETYL FENTANYL QUANTIFICATION: NORMAL NG/ML
SL AMB N-DESMETHYL U-47700 QUANTIFICATION: NORMAL NG/ML
SL AMB N-DESMETHYL-TRAMADOL QUANTIFICATION: NEGATIVE NG/ML
SL AMB N-DESMETHYLCLOZAPINE QUANTIFICATION: NEGATIVE NG/ML
SL AMB PHENTERMINE QUANTIFICATION: NEGATIVE NG/ML
SL AMB PREGABALIN QUANTIFICATION: NEGATIVE
SL AMB RISPERIDONE QUANTIFICATION: NEGATIVE NG/ML
SL AMB U-47700 QUANTIFICATION: NORMAL NG/ML
SMOOTH MUSCLE AB TITR SER IF: NEGATIVE NG/ML
SPECIMEN PH ACCEPTABLE UR: NORMAL
TAPENTADOL UR QL CFM: NEGATIVE NG/ML
TEMAZEPAM UR QL CFM: NEGATIVE NG/ML
TEMAZEPAM UR QL CFM: NEGATIVE NG/ML
TRAMADOL UR QL CFM: NEGATIVE NG/ML
URATE/CREAT 24H UR: NEGATIVE NG/ML

## 2020-11-19 ENCOUNTER — TELEPHONE (OUTPATIENT)
Dept: INTERVENTIONAL RADIOLOGY/VASCULAR | Facility: HOSPITAL | Age: 35
End: 2020-11-19

## 2020-11-23 ENCOUNTER — HOSPITAL ENCOUNTER (OUTPATIENT)
Dept: MRI IMAGING | Facility: HOSPITAL | Age: 35
Discharge: HOME | End: 2020-11-23
Attending: ANESTHESIOLOGY
Payer: COMMERCIAL

## 2020-11-23 ENCOUNTER — HOSPITAL ENCOUNTER (OUTPATIENT)
Dept: RADIOLOGY | Facility: HOSPITAL | Age: 35
Discharge: HOME | End: 2020-11-23
Attending: ANESTHESIOLOGY
Payer: COMMERCIAL

## 2020-11-23 DIAGNOSIS — M16.0 BILATERAL HIP JOINT ARTHRITIS: ICD-10-CM

## 2020-11-23 DIAGNOSIS — M16.0 BILATERAL PRIMARY OSTEOARTHRITIS OF HIP: ICD-10-CM

## 2020-11-23 DIAGNOSIS — S73.191D TEAR OF RIGHT ACETABULAR LABRUM, SUBSEQUENT ENCOUNTER: ICD-10-CM

## 2020-11-23 DIAGNOSIS — S73.191S TEAR OF ACETABULAR LABRUM, RIGHT, SEQUELA: ICD-10-CM

## 2020-11-23 PROCEDURE — 27095 INJECTION FOR HIP X-RAY: CPT

## 2020-11-23 PROCEDURE — 73722 MRI JOINT OF LWR EXTR W/DYE: CPT

## 2020-11-23 PROCEDURE — G1004 CDSM NDSC: HCPCS

## 2020-11-23 PROCEDURE — 77002 NEEDLE LOCALIZATION BY XRAY: CPT

## 2020-11-23 PROCEDURE — A9585 GADOBUTROL INJECTION: HCPCS | Performed by: ANESTHESIOLOGY

## 2020-11-23 RX ORDER — SODIUM CHLORIDE 9 MG/ML
5 INJECTION INTRAVENOUS
Status: COMPLETED | OUTPATIENT
Start: 2020-11-23 | End: 2020-11-23

## 2020-11-23 RX ORDER — LIDOCAINE HYDROCHLORIDE 10 MG/ML
10 INJECTION, SOLUTION EPIDURAL; INFILTRATION; INTRACAUDAL; PERINEURAL
Status: COMPLETED | OUTPATIENT
Start: 2020-11-23 | End: 2020-11-23

## 2020-11-23 RX ADMIN — IOHEXOL 3 ML: 300 INJECTION, SOLUTION INTRAVENOUS at 11:58

## 2020-11-23 RX ADMIN — LIDOCAINE HYDROCHLORIDE 7 ML: 10 INJECTION, SOLUTION EPIDURAL; INFILTRATION; INTRACAUDAL; PERINEURAL at 11:58

## 2020-11-23 RX ADMIN — GADOBUTROL 2 ML: 604.72 INJECTION INTRAVENOUS at 11:58

## 2020-11-23 RX ADMIN — SODIUM CHLORIDE 5 ML: 9 INJECTION, SOLUTION INTRAMUSCULAR; INTRAVENOUS; SUBCUTANEOUS at 11:59

## 2020-12-08 ENCOUNTER — OFFICE VISIT (OUTPATIENT)
Dept: PAIN MEDICINE | Facility: CLINIC | Age: 35
End: 2020-12-08
Payer: COMMERCIAL

## 2020-12-08 VITALS
DIASTOLIC BLOOD PRESSURE: 70 MMHG | SYSTOLIC BLOOD PRESSURE: 108 MMHG | WEIGHT: 293 LBS | HEIGHT: 65 IN | BODY MASS INDEX: 48.82 KG/M2

## 2020-12-08 DIAGNOSIS — M70.61 GREATER TROCHANTERIC BURSITIS OF BOTH HIPS: ICD-10-CM

## 2020-12-08 DIAGNOSIS — M46.1 SACROILIITIS (HCC): ICD-10-CM

## 2020-12-08 DIAGNOSIS — G89.29 CHRONIC BILATERAL LOW BACK PAIN WITHOUT SCIATICA: ICD-10-CM

## 2020-12-08 DIAGNOSIS — M54.50 CHRONIC BILATERAL LOW BACK PAIN WITHOUT SCIATICA: ICD-10-CM

## 2020-12-08 DIAGNOSIS — G89.29 CHRONIC HIP PAIN, BILATERAL: ICD-10-CM

## 2020-12-08 DIAGNOSIS — M25.552 CHRONIC HIP PAIN, BILATERAL: ICD-10-CM

## 2020-12-08 DIAGNOSIS — M70.62 GREATER TROCHANTERIC BURSITIS OF BOTH HIPS: ICD-10-CM

## 2020-12-08 DIAGNOSIS — M25.551 CHRONIC HIP PAIN, BILATERAL: ICD-10-CM

## 2020-12-08 DIAGNOSIS — G89.4 CHRONIC PAIN SYNDROME: Primary | ICD-10-CM

## 2020-12-08 PROCEDURE — 99213 OFFICE O/P EST LOW 20 MIN: CPT | Performed by: NURSE PRACTITIONER

## 2020-12-08 PROCEDURE — 3008F BODY MASS INDEX DOCD: CPT | Performed by: ANESTHESIOLOGY

## 2020-12-08 RX ORDER — DICLOFENAC SODIUM 75 MG/1
75 TABLET, DELAYED RELEASE ORAL 2 TIMES DAILY
Qty: 60 TABLET | Refills: 1 | Status: SHIPPED | OUTPATIENT
Start: 2020-12-08

## 2020-12-30 ENCOUNTER — EVALUATION (OUTPATIENT)
Dept: PHYSICAL THERAPY | Facility: HOME HEALTHCARE | Age: 35
End: 2020-12-30
Payer: COMMERCIAL

## 2020-12-30 DIAGNOSIS — M25.552 CHRONIC HIP PAIN, BILATERAL: ICD-10-CM

## 2020-12-30 DIAGNOSIS — G89.29 CHRONIC HIP PAIN, BILATERAL: ICD-10-CM

## 2020-12-30 DIAGNOSIS — M25.551 CHRONIC HIP PAIN, BILATERAL: ICD-10-CM

## 2020-12-30 DIAGNOSIS — M46.1 SACROILIITIS (HCC): ICD-10-CM

## 2020-12-30 DIAGNOSIS — M54.50 CHRONIC BILATERAL LOW BACK PAIN WITHOUT SCIATICA: ICD-10-CM

## 2020-12-30 DIAGNOSIS — G89.29 CHRONIC BILATERAL LOW BACK PAIN WITHOUT SCIATICA: ICD-10-CM

## 2020-12-30 DIAGNOSIS — G89.4 CHRONIC PAIN SYNDROME: Primary | ICD-10-CM

## 2020-12-30 PROCEDURE — 97110 THERAPEUTIC EXERCISES: CPT | Performed by: PHYSICAL THERAPIST

## 2020-12-30 PROCEDURE — 97162 PT EVAL MOD COMPLEX 30 MIN: CPT | Performed by: PHYSICAL THERAPIST

## 2020-12-30 PROCEDURE — 97535 SELF CARE MNGMENT TRAINING: CPT | Performed by: PHYSICAL THERAPIST

## 2021-01-07 ENCOUNTER — OFFICE VISIT (OUTPATIENT)
Dept: PHYSICAL THERAPY | Facility: HOME HEALTHCARE | Age: 36
End: 2021-01-07
Payer: COMMERCIAL

## 2021-01-07 DIAGNOSIS — G89.4 CHRONIC PAIN SYNDROME: Primary | ICD-10-CM

## 2021-01-07 PROCEDURE — 97140 MANUAL THERAPY 1/> REGIONS: CPT | Performed by: PHYSICAL THERAPIST

## 2021-01-07 PROCEDURE — 97110 THERAPEUTIC EXERCISES: CPT | Performed by: PHYSICAL THERAPIST

## 2021-01-07 NOTE — PROGRESS NOTES
Daily Note     Today's date: 2021  Patient name: Debora Britton  : 1985  MRN: [id number]  Referring provider: MAGALYS Mcarthur  Dx:   Encounter Diagnosis     ICD-10-CM    1  Chronic pain syndrome  G89 4                   Subjective: The patient states, "Today's a bad day and I'm not sure why "  She rates pain 9/10 at start of session  Objective: See treatment diary below  Assessment: Initiated TE as outlined below in daily treatment diary  Fair tolerance to initiation of TE today  She had some pain with supine SLR  HP x 10 minutes to end session and she reports decreased pain after this  Plan: Continue per plan of care  Progress as able in upcoming visits         Precautions: None  Re-Eval DUE: 21    Manuals 20      BLE NV 10 minutes                              Neuro Re-Ed  21      MTP/LTP  Green 1 x 10 each                                                      Ther Ex 20      NuStep Level 1  10 minutes Level 1  10 minutes      UBE Retro        HR/TR  1 x 10 each      SLR x 3  Nhaid 1 x 10 each       Marches  1 x 10      Squats  1 x 10       LAQ        PPT  3" 1 x 10      PPT w/march        Uri Hip Flex        Hooklying Heel Walk        SLR  Nahid 1 x 10      Bridges        Hip Add w/Ball  1 x 10      Hip Abd with TBand        LTR  1 x 10      SKTC        S/L Hip Abd        Clamshells                Ther Activity                        Gait Training                        Modalities  21      HP/CP prn  HP Seated 10 minutes

## 2021-01-11 ENCOUNTER — APPOINTMENT (OUTPATIENT)
Dept: PHYSICAL THERAPY | Facility: HOME HEALTHCARE | Age: 36
End: 2021-01-11
Payer: COMMERCIAL

## 2021-01-21 ENCOUNTER — APPOINTMENT (OUTPATIENT)
Dept: PHYSICAL THERAPY | Facility: HOME HEALTHCARE | Age: 36
End: 2021-01-21
Payer: COMMERCIAL

## 2021-01-24 NOTE — PROGRESS NOTES
PT Discharge    Today's date: 2021  Patient name: Justyna Alexander  : 1985  MRN: [id number]  Referring provider: MAGALYS Aguilar  Dx:   Encounter Diagnosis     ICD-10-CM    1  Chronic pain syndrome  G89 4        Start Time:   Stop Time: 738  Total time in clinic (min): 50 minutes    Assessment  Assessment details: The patient had her PT IE on 20 and she was seen in PT for one more visit on 21  She did not show for her next four appointments and did not return for more treatment  Unable to assess her current status, refer to her PT IE for her final assessment  She did not meet her goals secondary to being seen for only two visits  Unable to keep patient on hold, D/C PT secondary to patient non-compliant with PT visits  D/C PT  Impairments: abnormal or restricted ROM, activity intolerance, impaired physical strength, lacks appropriate home exercise program, pain with function, poor posture  and poor body mechanics  Other impairment: decreased flexibility  Functional limitations: difficulty sleepingUnderstanding of Dx/Px/POC: good   Prognosis: fair    Goals  STGs:  1  Initiate and complete HEP with verbal cues  - not met  2  Improve BLE ROM by 5-10 degrees in 4 weeks  - not met  3  Improve BLE strength by 1/2 grade in 4 weeks  - not met  4  Decrease LBP and hip pain by > 25% in 4 weeks  - not met  LTGs:  1  Patient to be I with HEP in 12 weeks  - not met  2  Improve L/S ROM to WNL t/o in 12 weeks to improve function  - not met  3  Decrease LBP and hip pain to < or = to 3-4/10 with activity in 12 weeks to improve function  - not met  4  Improve BLE strength to > or = to 4+ to 5/5 t/o in 12 weeks to improve function  - not met  5  Improve BLE ROM to WNL t/o in 12 weeks to improve function  - not met  6  Postural control is improved to maximal level of function in 12 weeks  - not met  7  Stair negotiation is improved to maximal level of function in 12 weeks  - not met  8  Recreational performance is improved to maximal level of function in 12 weeks  - not met  9  ADL performance is improved to maximal level of function in 12 weeks  - not met  10  Patient to report improved sleep in 12 weeks  - not met      Plan  Plan details: Unable to keep patient on hold, D/C PT secondary to patient non-compliant with PT visits  D/C PT  Planned modality interventions: cryotherapy, thermotherapy: hydrocollator packs and unattended electrical stimulation  Other planned modality interventions: Graston technique  Planned therapy interventions: abdominal trunk stabilization, manual therapy, behavior modification, body mechanics training, neuromuscular re-education, patient education, postural training, self care, strengthening, stretching, therapeutic activities, therapeutic exercise, flexibility and home exercise program        Subjective Evaluation    History of Present Illness  Mechanism of injury: The patient states that she has had back pain and hip pain for years  She notes that prior treatment has included PT and pain management  She had seen her PCP and the orthopedic doctor and both had referred her to pain management  She had been seeing Dr Augustus Councilman over the summer and he did an injection in her R hip, per patient with little relief for only a few weeks  She had been back to see him the end of October and then she was referred to Dr Toney Yap  She had initially seen him the beginning of November  She also had an MRI of her R hip the end of November and per patient she was told there was no tears  She had followed up [facility]om[facility] in December and from there she was referred to [facility]  and she now presents for her evaluation  She will be going back to see pain management for her follow up appointment on 21  Quality of life: fair    Pain  At best pain ratin  At worst pain ratin  Location: LBP - pain into B hips R > L  Denies N&T into legs      Quality: sharp, dull ache and discomfort  Aggravating factors: sitting, standing, stair climbing, walking and lifting    Social Support  Steps to enter house: yes  Stairs in house: yes   Lives in: multiple-level home  Lives with: significant other and young children    Employment status: not working    Diagnostic Tests  MRI studies: normal  Treatments  Previous treatment: injection treatment and physical therapy  Patient Goals  Patient goals for therapy: increased motion, decreased pain and increased strength  Patient goal: "To help with the pain in my back and hip, to get the pain to ease up "          Objective     Concurrent Complaints  Positive for night pain and disturbed sleep  Negative for bladder dysfunction, bowel dysfunction and saddle (S4) numbness    Postural Observations  Seated posture: fair  Standing posture: fair  Correction of posture: has no consistent effect        Palpation   Left   Tenderness of the erector spinae and lumbar paraspinals  Right   Tenderness of the erector spinae and lumbar paraspinals  Tenderness     Left Hip   No tenderness in the PSIS  Right Hip   No tenderness in the PSIS  Active Range of Motion     Lumbar   Flexion: Active lumbar flexion: 23" from floor    with pain  Extension:  with pain Restriction level: minimal  Left lateral flexion: Active left lumbar lateral flexion: 25"    with pain  Right lateral flexion: Active right lumbar lateral flexion: 25"  with pain  Left rotation:  with pain Restriction level: minimal  Right rotation:  with pain Restriction level: moderate  Left Hip   Flexion: 80 degrees with pain  Abduction: 30 degrees   External rotation (90/90): 40 degrees   Internal rotation (90/90): 20 degrees     Right Hip   Flexion: 50 degrees with pain  Abduction: 12 degrees with pain  External rotation (90/90): 25 degrees with pain  Internal rotation (90/90): 10 degrees with pain    Additional Active Range of Motion Details  L SLR: 40  R SLR: 22  Mechanical Assessment    Cervical      Thoracic      Lumbar    Standing flexion: repeated movements   Pain location:no change  Standing extension: repeated movements  Pain location: no change    Strength/Myotome Testing     Left Hip   Planes of Motion   Flexion: 4  Abduction: 4  Adduction: 4+  External rotation: 4+  Internal rotation: 4+    Right Hip   Planes of Motion   Flexion: 4-  Abduction: 4-  Adduction: 4+  External rotation: 4  Internal rotation: 4    Left Knee   Flexion: WFL  Extension: WFL    Right Knee   Flexion: 4+  Extension: 4    Ambulation   Weight-Bearing Status   Assistive device used: none    Ambulation: Level Surfaces   Ambulation without assistive device: independent    Ambulation: Stairs   Ascend stairs: independent  Pattern: non-reciprocal  Railings: one rail  Descend stairs: independent  Pattern: non-reciprocal  Railings: one rail    Observational Gait   Decreased walking speed

## 2021-01-25 ENCOUNTER — APPOINTMENT (OUTPATIENT)
Dept: PHYSICAL THERAPY | Facility: HOME HEALTHCARE | Age: 36
End: 2021-01-25
Payer: COMMERCIAL

## 2021-01-28 ENCOUNTER — APPOINTMENT (OUTPATIENT)
Dept: PHYSICAL THERAPY | Facility: HOME HEALTHCARE | Age: 36
End: 2021-01-28
Payer: COMMERCIAL

## 2021-01-29 ENCOUNTER — HOSPITAL ENCOUNTER (EMERGENCY)
Facility: HOSPITAL | Age: 36
Discharge: HOME | End: 2021-01-30
Attending: EMERGENCY MEDICINE
Payer: COMMERCIAL

## 2021-01-29 VITALS
WEIGHT: 293 LBS | SYSTOLIC BLOOD PRESSURE: 153 MMHG | BODY MASS INDEX: 48.82 KG/M2 | HEIGHT: 65 IN | DIASTOLIC BLOOD PRESSURE: 86 MMHG | OXYGEN SATURATION: 98 % | RESPIRATION RATE: 16 BRPM | TEMPERATURE: 97.6 F | HEART RATE: 90 BPM

## 2021-01-29 DIAGNOSIS — G43.909 MIGRAINE: Primary | ICD-10-CM

## 2021-01-29 PROCEDURE — 99284 EMERGENCY DEPT VISIT MOD MDM: CPT | Performed by: EMERGENCY MEDICINE

## 2021-01-29 PROCEDURE — 96372 THER/PROPH/DIAG INJ SC/IM: CPT

## 2021-01-29 PROCEDURE — 81025 URINE PREGNANCY TEST: CPT | Performed by: EMERGENCY MEDICINE

## 2021-01-29 PROCEDURE — 99283 EMERGENCY DEPT VISIT LOW MDM: CPT

## 2021-01-29 PROCEDURE — 96365 THER/PROPH/DIAG IV INF INIT: CPT

## 2021-01-29 PROCEDURE — 96375 TX/PRO/DX INJ NEW DRUG ADDON: CPT

## 2021-01-29 RX ORDER — HALOPERIDOL 5 MG/ML
5 INJECTION INTRAMUSCULAR ONCE
Status: COMPLETED | OUTPATIENT
Start: 2021-01-29 | End: 2021-01-29

## 2021-01-29 RX ORDER — MAGNESIUM SULFATE HEPTAHYDRATE 40 MG/ML
2 INJECTION, SOLUTION INTRAVENOUS ONCE
Status: COMPLETED | OUTPATIENT
Start: 2021-01-29 | End: 2021-01-30

## 2021-01-29 RX ORDER — KETOROLAC TROMETHAMINE 30 MG/ML
15 INJECTION, SOLUTION INTRAMUSCULAR; INTRAVENOUS ONCE
Status: COMPLETED | OUTPATIENT
Start: 2021-01-29 | End: 2021-01-29

## 2021-01-29 RX ORDER — DEXAMETHASONE SODIUM PHOSPHATE 10 MG/ML
10 INJECTION, SOLUTION INTRAMUSCULAR; INTRAVENOUS ONCE
Status: COMPLETED | OUTPATIENT
Start: 2021-01-29 | End: 2021-01-29

## 2021-01-29 RX ADMIN — MAGNESIUM SULFATE IN WATER 2 G: 40 INJECTION, SOLUTION INTRAVENOUS at 23:42

## 2021-01-29 RX ADMIN — DEXAMETHASONE SODIUM PHOSPHATE 10 MG: 10 INJECTION, SOLUTION INTRAMUSCULAR; INTRAVENOUS at 23:37

## 2021-01-29 RX ADMIN — HALOPERIDOL LACTATE 5 MG: 5 INJECTION, SOLUTION INTRAMUSCULAR at 23:45

## 2021-01-29 RX ADMIN — KETOROLAC TROMETHAMINE 15 MG: 30 INJECTION, SOLUTION INTRAMUSCULAR at 23:40

## 2021-01-29 RX ADMIN — SODIUM CHLORIDE 1000 ML: 0.9 INJECTION, SOLUTION INTRAVENOUS at 23:36

## 2021-01-30 LAB
EXT PREG TEST URINE: NEGATIVE
EXT. CONTROL ED NAV: NORMAL

## 2021-01-30 PROCEDURE — 96367 TX/PROPH/DG ADDL SEQ IV INF: CPT

## 2021-01-30 PROCEDURE — 96366 THER/PROPH/DIAG IV INF ADDON: CPT

## 2021-01-30 RX ADMIN — VALPROATE SODIUM 1000 MG: 100 INJECTION, SOLUTION INTRAVENOUS at 01:35

## 2021-01-30 NOTE — ED PROVIDER NOTES
History  Chief Complaint   Patient presents with    Migraine     constant ongoing headache x2 weeks; tylenol and advil taken with no relief; associated with nausea, photosensitivity     This is a morbidly obese 44-year-old female with a history of bipolar disorder, hyperlipidemia, GERD, migraines who presents with a headache  Over the past 2 weeks, the patient has been experiencing a throbbing, frontal headache which is nonradiating, worsening over the past 2 weeks, associated with nausea without vomiting  She has experienced similar migraines in the past   She has tried taking Tylenol and Advil without relief  Pain is made worse with bright lights  Denies fever/chills, vomiting, lightheadedness/dizziness, numbness/weakness, change in vision, URI symptoms, neck pain, chest pain, palpitations, shortness of breath, cough, back pain, flank pain, abdominal pain, diarrhea, hematochezia, melena, dysuria, hematuria, abnormal vaginal discharge/bleeding  Prior to Admission Medications   Prescriptions Last Dose Informant Patient Reported? Taking?    ARIPiprazole (ABILIFY) 10 mg tablet   Yes No   Sig: Take 10 mg by mouth daily   LORazepam (ATIVAN) 1 mg tablet   Yes No   Sig: Take 1 mg by mouth daily at bedtime   Nebulizers (NEBULIZER COMPRESSOR) MISC   No No   Sig: Use as directed   SUMAtriptan (IMITREX) 50 mg tablet   No No   Sig: Take 1 tablet (50 mg total) by mouth once as needed for migraine for up to 1 dose   albuterol (2 5 mg/3 mL) 0 083 % nebulizer solution   No No   Sig: Take 1 vial (2 5 mg total) by nebulization every 6 (six) hours as needed for wheezing or shortness of breath   albuterol (Ventolin HFA) 90 mcg/act inhaler   No No   Sig: Inhale 2 puffs every 4 (four) hours as needed for wheezing or shortness of breath   atorvastatin (LIPITOR) 10 mg tablet   No No   Sig: Take 1 tablet (10 mg total) by mouth daily   diclofenac (VOLTAREN) 75 mg EC tablet   No No   Sig: Take 1 tablet (75 mg total) by mouth 2 (two) times a day With food   diclofenac sodium (VOLTAREN) 1 %   No No   Sig: Apply 2 g topically 4 (four) times a day   famotidine (PEPCID) 20 mg tablet   No No   Sig: Take 1 tablet (20 mg total) by mouth 2 (two) times a day   magnesium oxide (MAG-OX) 400 mg   No No   Sig: Take 1 tablet (400 mg total) by mouth daily   metoclopramide (REGLAN) 10 mg tablet   No No   Sig: Take 1 tablet (10 mg total) by mouth every 6 (six) hours as needed (n/v) for up to 30 doses   montelukast (SINGULAIR) 10 mg tablet   No No   Sig: Take 1 tablet (10 mg total) by mouth daily   naproxen (NAPROSYN) 500 mg tablet   No No   Sig: Take 1 tablet (500 mg total) by mouth 2 (two) times a day with meals for 10 doses   topiramate (Topamax) 50 MG tablet   No No   Sig: Take 1 tablet (50 mg total) by mouth 2 (two) times a day      Facility-Administered Medications: None       Past Medical History:   Diagnosis Date    Anxiety     Asthma     Bipolar 1 disorder (HCC)     Depression     GERD (gastroesophageal reflux disease)     Hypercholesterolemia        Past Surgical History:   Procedure Laterality Date    CHOLECYSTECTOMY      FL INJECTION RIGHT HIP (ARTHROGRAM)  11/23/2020    FL INJECTION RIGHT HIP (NON ARTHROGRAM)  8/10/2020    TONSILLECTOMY      TUBAL LIGATION         Family History   Problem Relation Age of Onset    Mental illness Mother     Depression Mother     Cancer Mother     Hyperlipidemia Mother     Asthma Father     Arthritis Father     Cancer Brother     Psoriasis Daughter      I have reviewed and agree with the history as documented      E-Cigarette/Vaping    E-Cigarette Use Never User      E-Cigarette/Vaping Substances    Nicotine No     THC No     CBD No     Flavoring No     Other No     Unknown No      Social History     Tobacco Use    Smoking status: Current Every Day Smoker     Packs/day: 0 50     Types: Cigarettes    Smokeless tobacco: Never Used   Substance Use Topics    Alcohol use: Never Frequency: Never    Drug use: Not Currently       Review of Systems   Constitutional: Negative for chills and fever  HENT: Negative for rhinorrhea, sore throat and trouble swallowing  Eyes: Negative for photophobia and visual disturbance  Respiratory: Negative for cough, chest tightness and shortness of breath  Cardiovascular: Negative for chest pain, palpitations and leg swelling  Gastrointestinal: Positive for nausea  Negative for abdominal pain, blood in stool, diarrhea and vomiting  Endocrine: Negative for polyuria  Genitourinary: Negative for dysuria, flank pain, hematuria, vaginal bleeding and vaginal discharge  Musculoskeletal: Negative for back pain and neck pain  Skin: Negative for color change and rash  Allergic/Immunologic: Negative for immunocompromised state  Neurological: Positive for headaches  Negative for dizziness, weakness, light-headedness and numbness  All other systems reviewed and are negative  Physical Exam  Physical Exam  Constitutional:       General: She is not in acute distress  Appearance: Normal appearance  She is well-developed  She is not ill-appearing or toxic-appearing  HENT:      Head: Normocephalic and atraumatic  Nose: Nose normal    Eyes:      General: Lids are normal       Conjunctiva/sclera: Conjunctivae normal       Pupils: Pupils are equal, round, and reactive to light  Funduscopic exam:     Right eye: No AV nicking or papilledema  Left eye: No AV nicking or papilledema  Comments: Lateral strabismus of left eye (chronic for patient)  Neck:      Musculoskeletal: Full passive range of motion without pain, normal range of motion and neck supple  Thyroid: No thyroid mass  Trachea: Trachea normal    Cardiovascular:      Rate and Rhythm: Normal rate and regular rhythm  Pulses: Normal pulses  Heart sounds: Normal heart sounds  No murmur     Pulmonary:      Effort: Pulmonary effort is normal  Breath sounds: Normal breath sounds  Abdominal:      General: Bowel sounds are normal       Palpations: Abdomen is soft  Abdomen is not rigid  Tenderness: There is no abdominal tenderness  There is no guarding or rebound  Neurological:      Mental Status: She is alert and oriented to person, place, and time  GCS: GCS eye subscore is 4  GCS verbal subscore is 5  GCS motor subscore is 6  Cranial Nerves: No cranial nerve deficit  Sensory: No sensory deficit  Coordination: Coordination normal       Gait: Gait normal       Comments: Cranial nerves 2-12 intact, strength 5/5 throughout, sensation intact throughout  Visual fields normal  Normal finger-to-nose and heel-to-shin  Normal gait  Psychiatric:         Speech: Speech normal          Behavior: Behavior normal  Behavior is cooperative  Thought Content:  Thought content normal          Vital Signs  ED Triage Vitals [01/29/21 2300]   Temperature Pulse Respirations Blood Pressure SpO2   97 6 °F (36 4 °C) 90 16 153/86 98 %      Temp Source Heart Rate Source Patient Position - Orthostatic VS BP Location FiO2 (%)   Temporal Monitor Lying Right arm --      Pain Score       Worst Possible Pain           Vitals:    01/29/21 2300   BP: 153/86   Pulse: 90   Patient Position - Orthostatic VS: Lying         Visual Acuity      ED Medications  Medications   sodium chloride 0 9 % bolus 1,000 mL (0 mL Intravenous Stopped 1/30/21 0133)   valproate (DEPACON) 1,000 mg in sodium chloride 0 9 % 50 mL for headache (0 g Intravenous Stopped 1/30/21 0204)   haloperidol lactate (HALDOL) injection 5 mg (5 mg Intramuscular Given 1/29/21 2345)   ketorolac (TORADOL) injection 15 mg (15 mg Intravenous Given 1/29/21 2340)   magnesium sulfate 2 g/50 mL IVPB (premix) 2 g (0 g Intravenous Stopped 1/30/21 0133)   dexamethasone (PF) (DECADRON) injection 10 mg (10 mg Intravenous Given 1/29/21 2337)       Diagnostic Studies  Results Reviewed     Procedure Component Value Units Date/Time    POCT pregnancy, urine [[id number]]  (Normal) Resulted: 01/30/21 0136    Lab Status: Final result Updated: 01/30/21 0136     EXT PREG TEST UR (Ref: Negative) NEGATIVE     Control valid                 No orders to display              Procedures  Procedures         ED Course  ED Course as of Jan 30 0223   Sat Jan 30, 2021 0222 Re-evaluated the patient  Patient was sleeping comfortably  Had to wake her up from sleep  States that she is feeling a little bit better  Will discharge at this time  Recommend follow up with family doctor  May ultimately benefit from a neurologist   However, will defer that to the family doctor  Strict return precautions given  MDM  Number of Diagnoses or Management Options  Diagnosis management comments: Normal neuro exam in a patient with a migraine headache  Will treat the patient symptomatically  Disposition  Final diagnoses:   Migraine     Time reflects when diagnosis was documented in both MDM as applicable and the Disposition within this note     Time User Action Codes Description Comment    1/30/2021  2:22 AM Khoa Zeng Add [G43 909] Migraine       ED Disposition     ED Disposition Condition Date/Time Comment    Discharge Stable Sat Jan 30, 2021  2:22 AM Ade Osuna discharge to home/self care              Follow-up Information     Follow up With Specialties Details Why Contact Info Jossie Squires 47, PA-C Family Medicine Schedule an appointment as soon as possible for a visit    [address]  [place] [address] [phone]       [facility] Emergency Department Emergency Medicine Go to  If symptoms worsen [phone]  [facility] Emergency Department, [address], [address]          Patient's Medications   Discharge Prescriptions    No medications on file     No discharge procedures on file      PDMP Review     None          ED Provider  Electronically Signed by           Kate Murdock MD  01/30/21 6812

## 2021-02-15 DIAGNOSIS — Z12.4 SCREENING FOR CERVICAL CANCER: Primary | ICD-10-CM

## 2021-03-11 ENCOUNTER — HOSPITAL ENCOUNTER (EMERGENCY)
Facility: HOSPITAL | Age: 36
Discharge: HOME | End: 2021-03-11
Attending: EMERGENCY MEDICINE
Payer: COMMERCIAL

## 2021-03-11 ENCOUNTER — APPOINTMENT (EMERGENCY)
Dept: RADIOLOGY | Facility: HOSPITAL | Age: 36
End: 2021-03-11
Payer: COMMERCIAL

## 2021-03-11 VITALS
HEART RATE: 95 BPM | SYSTOLIC BLOOD PRESSURE: 135 MMHG | TEMPERATURE: 98.9 F | HEIGHT: 65 IN | OXYGEN SATURATION: 99 % | WEIGHT: 293 LBS | BODY MASS INDEX: 48.82 KG/M2 | RESPIRATION RATE: 18 BRPM | DIASTOLIC BLOOD PRESSURE: 78 MMHG

## 2021-03-11 DIAGNOSIS — M79.672 PAIN OF LEFT HEEL: Primary | ICD-10-CM

## 2021-03-11 DIAGNOSIS — M72.2 PLANTAR FASCIITIS: ICD-10-CM

## 2021-03-11 LAB
EXT PREG TEST URINE: NEGATIVE
EXT. CONTROL ED NAV: NORMAL

## 2021-03-11 PROCEDURE — 81025 URINE PREGNANCY TEST: CPT | Performed by: PHYSICIAN ASSISTANT

## 2021-03-11 PROCEDURE — 73630 X-RAY EXAM OF FOOT: CPT

## 2021-03-11 PROCEDURE — 96372 THER/PROPH/DIAG INJ SC/IM: CPT

## 2021-03-11 PROCEDURE — 99284 EMERGENCY DEPT VISIT MOD MDM: CPT

## 2021-03-11 PROCEDURE — 99284 EMERGENCY DEPT VISIT MOD MDM: CPT | Performed by: PHYSICIAN ASSISTANT

## 2021-03-11 RX ORDER — NAPROXEN 500 MG/1
500 TABLET ORAL 2 TIMES DAILY WITH MEALS
Qty: 30 TABLET | Refills: 0 | Status: SHIPPED | OUTPATIENT
Start: 2021-03-11

## 2021-03-11 RX ORDER — KETOROLAC TROMETHAMINE 30 MG/ML
30 INJECTION, SOLUTION INTRAMUSCULAR; INTRAVENOUS ONCE
Status: COMPLETED | OUTPATIENT
Start: 2021-03-11 | End: 2021-03-11

## 2021-03-11 RX ADMIN — KETOROLAC TROMETHAMINE 30 MG: 30 INJECTION, SOLUTION INTRAMUSCULAR at 12:49

## 2021-03-11 NOTE — ED NOTES
New at bedside     Dalton Branch, [facility]  03/11/21 9377

## 2021-03-11 NOTE — ED PROVIDER NOTES
History  Chief Complaint   Patient presents with    Foot Pain     pt reports left heel pain started four days ago, denies injury     30year old female presents ambulatory from home with significant other for evaluation of left heel pain  She reports this started about 4 days ago  Denies injury or trauma  She reports a sharp pain in the back of heel  Pain worse with bearing weight  Pain is improved with rest   Tried tylenol without relief  Denies swelling, redness, numbness or tingling  Denies prior foot problems  Denies fever, chills  Denies recent travel  Denies recent illness  Denies chest pain, SOB, N/V/D, abdominal pain  History provided by:  Patient   used: No        Prior to Admission Medications   Prescriptions Last Dose Informant Patient Reported? Taking?    ARIPiprazole (ABILIFY) 10 mg tablet   Yes No   Sig: Take 10 mg by mouth daily   LORazepam (ATIVAN) 1 mg tablet   Yes No   Sig: Take 1 mg by mouth daily at bedtime   Nebulizers (NEBULIZER COMPRESSOR) MISC   No No   Sig: Use as directed   SUMAtriptan (IMITREX) 50 mg tablet   No No   Sig: Take 1 tablet (50 mg total) by mouth once as needed for migraine for up to 1 dose   albuterol (2 5 mg/3 mL) 0 083 % nebulizer solution   No No   Sig: Take 1 vial (2 5 mg total) by nebulization every 6 (six) hours as needed for wheezing or shortness of breath   albuterol (Ventolin HFA) 90 mcg/act inhaler   No No   Sig: Inhale 2 puffs every 4 (four) hours as needed for wheezing or shortness of breath   atorvastatin (LIPITOR) 10 mg tablet   No No   Sig: Take 1 tablet (10 mg total) by mouth daily   diclofenac (VOLTAREN) 75 mg EC tablet   No No   Sig: Take 1 tablet (75 mg total) by mouth 2 (two) times a day With food   diclofenac sodium (VOLTAREN) 1 %   No No   Sig: Apply 2 g topically 4 (four) times a day   famotidine (PEPCID) 20 mg tablet   No No   Sig: Take 1 tablet (20 mg total) by mouth 2 (two) times a day   magnesium oxide (MAG-OX) 400 mg   No No   Sig: Take 1 tablet (400 mg total) by mouth daily   metoclopramide (REGLAN) 10 mg tablet   No No   Sig: Take 1 tablet (10 mg total) by mouth every 6 (six) hours as needed (n/v) for up to 30 doses   montelukast (SINGULAIR) 10 mg tablet   No No   Sig: Take 1 tablet (10 mg total) by mouth daily   naproxen (NAPROSYN) 500 mg tablet   No No   Sig: Take 1 tablet (500 mg total) by mouth 2 (two) times a day with meals for 10 doses   topiramate (Topamax) 50 MG tablet   No No   Sig: Take 1 tablet (50 mg total) by mouth 2 (two) times a day      Facility-Administered Medications: None       Past Medical History:   Diagnosis Date    Anxiety     Asthma     Bipolar 1 disorder (HCC)     Depression     GERD (gastroesophageal reflux disease)     Hypercholesterolemia        Past Surgical History:   Procedure Laterality Date    CHOLECYSTECTOMY      FL INJECTION RIGHT HIP (ARTHROGRAM)  11/23/2020    FL INJECTION RIGHT HIP (NON ARTHROGRAM)  8/10/2020    TONSILLECTOMY      TUBAL LIGATION         Family History   Problem Relation Age of Onset    Mental illness Mother     Depression Mother     Cancer Mother     Hyperlipidemia Mother     Asthma Father     Arthritis Father     Cancer Brother     Psoriasis Daughter      I have reviewed and agree with the history as documented  E-Cigarette/Vaping    E-Cigarette Use Never User      E-Cigarette/Vaping Substances    Nicotine No     THC No     CBD No     Flavoring No     Other No     Unknown No      Social History     Tobacco Use    Smoking status: Current Every Day Smoker     Packs/day: 0 50     Types: Cigarettes    Smokeless tobacco: Never Used   Substance Use Topics    Alcohol use: Never     Frequency: Never    Drug use: Not Currently       Review of Systems   Constitutional: Negative  Negative for chills, fatigue and fever  HENT: Negative  Negative for congestion, rhinorrhea and sore throat  Eyes: Negative  Negative for visual disturbance  Respiratory: Negative  Negative for cough, shortness of breath and wheezing  Cardiovascular: Negative  Negative for chest pain, palpitations and leg swelling  Gastrointestinal: Negative  Negative for abdominal pain, constipation, diarrhea, nausea and vomiting  Genitourinary: Negative  Negative for dysuria, flank pain, frequency and hematuria  Musculoskeletal: Positive for arthralgias  Negative for back pain  Skin: Negative  Negative for rash  Neurological: Negative  Negative for dizziness, light-headedness and headaches  Psychiatric/Behavioral: Negative  Negative for confusion  All other systems reviewed and are negative  Physical Exam  Physical Exam  Vitals signs and nursing note reviewed  Constitutional:       General: She is not in acute distress  Appearance: She is well-developed  She is obese  She is not toxic-appearing  HENT:      Head: Normocephalic and atraumatic  Right Ear: External ear normal       Left Ear: External ear normal    Eyes:      General: No scleral icterus  Conjunctiva/sclera: Conjunctivae normal       Pupils: Pupils are equal, round, and reactive to light  Neck:      Musculoskeletal: Normal range of motion and neck supple  Trachea: Trachea and phonation normal  No tracheal deviation  Cardiovascular:      Rate and Rhythm: Normal rate and regular rhythm  Pulses: Normal pulses  Heart sounds: Normal heart sounds, S1 normal and S2 normal  No murmur  Pulmonary:      Effort: Pulmonary effort is normal  No respiratory distress  Breath sounds: Normal breath sounds  No wheezing, rhonchi or rales  Musculoskeletal: Normal range of motion  Left ankle: She exhibits normal range of motion  No tenderness  Left foot: Normal capillary refill  Tenderness present  No swelling, deformity or laceration  Feet:       Comments: No overlying skin changes  No wounds  Skin:     General: Skin is warm and dry        Capillary Refill: Capillary refill takes less than 2 seconds  Findings: No bruising, erythema, rash or wound  Neurological:      General: No focal deficit present  Mental Status: She is alert and oriented to person, place, and time  GCS: GCS eye subscore is 4  GCS verbal subscore is 5  GCS motor subscore is 6  Cranial Nerves: No cranial nerve deficit  Sensory: Sensation is intact  No sensory deficit  Motor: Motor function is intact  No abnormal muscle tone  Deep Tendon Reflexes: Reflexes are normal and symmetric  Comments: Ambulated into department unassisted  Psychiatric:         Mood and Affect: Mood normal          Speech: Speech normal          Behavior: Behavior normal          Vital Signs  ED Triage Vitals [03/11/21 1238]   Temperature Pulse Respirations Blood Pressure SpO2   98 9 °F (37 2 °C) 100 18 145/90 98 %      Temp Source Heart Rate Source Patient Position - Orthostatic VS BP Location FiO2 (%)   Temporal Monitor Sitting Right arm --      Pain Score       6           Vitals:    03/11/21 1238 03/11/21 1325   BP: 145/90 135/78   Pulse: 100 95   Patient Position - Orthostatic VS: Sitting Sitting         Visual Acuity      ED Medications  Medications   ketorolac (TORADOL) injection 30 mg (30 mg Intramuscular Given 3/11/21 1249)       Diagnostic Studies  Results Reviewed     Procedure Component Value Units Date/Time    POCT pregnancy, urine [[id number]]  (Normal) Resulted: 03/11/21 1249    Lab Status: Final result Updated: 03/11/21 1249     EXT PREG TEST UR (Ref: Negative) negative     Control valid                 XR foot 3+ views LEFT    (Results Pending)              Procedures  Procedures         ED Course  ED Course as of Mar 11 1502   Thu Mar 11, 2021   1249 PREGNANCY TEST URINE: negative   1309 Independently viewed and interpreted by me - no fracture or acute osseous findings; pending official read  XR foot 3+ views LEFT   1312 Pt updated on results    Suspect plantar fasciitis  Usual course and treatment discussed  Reviewed RICE therapy  Reviewed stretches  Recommended night time splints  Rx NSAID  Can supplement with OTC tylenol  Strict return precautions outlined  Advised outpatient follow up with podiatry if not improving or return to ER for change in condition as outlined  Pt verbalized understanding and had no further questions  SBIRT 20yo+      Most Recent Value   SBIRT (24 yo +)   In order to provide better care to our patients, we are screening all of our patients for alcohol and drug use  Would it be okay to ask you these screening questions? Yes Filed at: 03/11/2021 1240   Initial Alcohol Screen: US AUDIT-C    1  How often do you have a drink containing alcohol?  0 Filed at: 03/11/2021 1240   2  How many drinks containing alcohol do you have on a typical day you are drinking? 0 Filed at: 03/11/2021 1240   3a  Male UNDER 65: How often do you have five or more drinks on one occasion? 0 Filed at: 03/11/2021 1240   3b  FEMALE Any Age, or MALE 65+: How often do you have 4 or more drinks on one occassion? 0 Filed at: 03/11/2021 1240   Audit-C Score  0 Filed at: 03/11/2021 1240   YINKA: How many times in the past year have you    Used an illegal drug or used a prescription medication for non-medical reasons?   Never Filed at: 03/11/2021 1240                    MDM  Number of Diagnoses or Management Options  Pain of left heel: new and requires workup  Plantar fasciitis: new and requires workup     Amount and/or Complexity of Data Reviewed  Tests in the radiology section of CPT®: ordered and reviewed  Decide to obtain previous medical records or to obtain history from someone other than the patient: yes  Obtain history from someone other than the patient: yes  Review and summarize past medical records: yes  Independent visualization of images, tracings, or specimens: yes    Patient Progress  Patient progress: improved      Disposition  Final diagnoses:   Pain of left heel   Plantar fasciitis     Time reflects when diagnosis was documented in both MDM as applicable and the Disposition within this note     Time User Action Codes Description Comment    3/11/2021  1:18 PM Hilariomichael Ofes Add [A83 172] Pain of left heel     3/11/2021  1:18 PM Gwendel Closs Add [M72 2] Plantar fasciitis       ED Disposition     ED Disposition Condition Date/Time Comment    Discharge Stable Thu Mar 11, 2021  1:18 PM Inez Lighter discharge to home/self care              Follow-up Information     Follow up With Specialties Details Why Contact Info Additional Information    Anita Gama DPM Podiatry, Wound Care Go in 1 week if not improving [id number] [facility] Emergency Department Emergency Medicine  As needed [place] [phone]  [facility] Emergency Department, [address], [address]          Discharge Medication List as of 3/11/2021  1:20 PM      CONTINUE these medications which have CHANGED    Details   naproxen (NAPROSYN) 500 mg tablet Take 1 tablet (500 mg total) by mouth 2 (two) times a day with meals, Starting Thu 3/11/2021, Normal         CONTINUE these medications which have NOT CHANGED    Details   albuterol (2 5 mg/3 mL) 0 083 % nebulizer solution Take 1 vial (2 5 mg total) by nebulization every 6 (six) hours as needed for wheezing or shortness of breath, Starting u 6/11/2020, Normal      albuterol (Ventolin HFA) 90 mcg/act inhaler Inhale 2 puffs every 4 (four) hours as needed for wheezing or shortness of breath, Starting Thu 6/11/2020, Normal      ARIPiprazole (ABILIFY) 10 mg tablet Take 10 mg by mouth daily, Historical Med      atorvastatin (LIPITOR) 10 mg tablet Take 1 tablet (10 mg total) by mouth daily, Starting Wed 9/30/2020, Normal      diclofenac (VOLTAREN) 75 mg EC tablet Take 1 tablet (75 mg total) by mouth 2 (two) times a day With food, Starting Tue 12/8/2020, Normal      diclofenac sodium (VOLTAREN) 1 % Apply 2 g topically 4 (four) times a day, Starting Thu 6/11/2020, Normal      famotidine (PEPCID) 20 mg tablet Take 1 tablet (20 mg total) by mouth 2 (two) times a day, Starting Mon 8/17/2020, Normal      LORazepam (ATIVAN) 1 mg tablet Take 1 mg by mouth daily at bedtime, Historical Med      magnesium oxide (MAG-OX) 400 mg Take 1 tablet (400 mg total) by mouth daily, Starting Tue 10/27/2020, Normal      metoclopramide (REGLAN) 10 mg tablet Take 1 tablet (10 mg total) by mouth every 6 (six) hours as needed (n/v) for up to 30 doses, Starting Wed 11/11/2020, Normal      montelukast (SINGULAIR) 10 mg tablet Take 1 tablet (10 mg total) by mouth daily, Starting Thu 6/11/2020, Normal      Nebulizers (NEBULIZER COMPRESSOR) MISC Use as directed, Normal      SUMAtriptan (IMITREX) 50 mg tablet Take 1 tablet (50 mg total) by mouth once as needed for migraine for up to 1 dose, Starting Tue 10/27/2020, Normal      topiramate (Topamax) 50 MG tablet Take 1 tablet (50 mg total) by mouth 2 (two) times a day, Starting Thu 6/11/2020, Normal           No discharge procedures on file      PDMP Review     None          ED Provider  Electronically Signed by           Siva Ozuna PA-C  03/11/21 8420

## 2021-03-11 NOTE — DISCHARGE INSTRUCTIONS
Rest, ice, compression, elevation  Wear supportive foot wear  Stretch the sole of the foot  Take anti-inflammatory as directed with food  Can supplement with OTC tylenol  Follow up with podiatry if symptoms not improving or return to ER as needed

## 2021-03-11 NOTE — ED NOTES
Xray at bedside     Duane Tenorio, [facility]  03/11/21 3830

## 2021-10-22 DIAGNOSIS — Z11.52 ENCOUNTER FOR SCREENING FOR COVID-19: Primary | ICD-10-CM

## 2021-10-22 PROCEDURE — U0005 INFEC AGEN DETEC AMPLI PROBE: HCPCS | Performed by: NURSE PRACTITIONER

## 2021-10-22 PROCEDURE — U0003 INFECTIOUS AGENT DETECTION BY NUCLEIC ACID (DNA OR RNA); SEVERE ACUTE RESPIRATORY SYNDROME CORONAVIRUS 2 (SARS-COV-2) (CORONAVIRUS DISEASE [COVID-19]), AMPLIFIED PROBE TECHNIQUE, MAKING USE OF HIGH THROUGHPUT TECHNOLOGIES AS DESCRIBED BY CMS-2020-01-R: HCPCS | Performed by: NURSE PRACTITIONER

## 2021-10-25 ENCOUNTER — TELEPHONE (OUTPATIENT)
Dept: FAMILY MEDICINE CLINIC | Facility: CLINIC | Age: 36
End: 2021-10-25

## 2022-03-04 ENCOUNTER — HOSPITAL ENCOUNTER (EMERGENCY)
Facility: HOSPITAL | Age: 37
Discharge: HOME | End: 2022-03-04
Attending: EMERGENCY MEDICINE | Admitting: EMERGENCY MEDICINE
Payer: COMMERCIAL

## 2022-03-04 VITALS
SYSTOLIC BLOOD PRESSURE: 146 MMHG | DIASTOLIC BLOOD PRESSURE: 82 MMHG | HEIGHT: 65 IN | BODY MASS INDEX: 48.82 KG/M2 | HEART RATE: 107 BPM | RESPIRATION RATE: 18 BRPM | WEIGHT: 293 LBS | TEMPERATURE: 99.1 F | OXYGEN SATURATION: 96 %

## 2022-03-04 DIAGNOSIS — R11.2 NAUSEA AND VOMITING: ICD-10-CM

## 2022-03-04 DIAGNOSIS — R68.89 FLU-LIKE SYMPTOMS: ICD-10-CM

## 2022-03-04 DIAGNOSIS — J06.9 URI (UPPER RESPIRATORY INFECTION): Primary | ICD-10-CM

## 2022-03-04 LAB
FLUAV RNA RESP QL NAA+PROBE: NEGATIVE
FLUBV RNA RESP QL NAA+PROBE: NEGATIVE
RSV RNA RESP QL NAA+PROBE: NEGATIVE
SARS-COV-2 RNA RESP QL NAA+PROBE: NEGATIVE

## 2022-03-04 PROCEDURE — 99284 EMERGENCY DEPT VISIT MOD MDM: CPT | Performed by: PHYSICIAN ASSISTANT

## 2022-03-04 PROCEDURE — 0241U HB NFCT DS VIR RESP RNA 4 TRGT: CPT | Performed by: EMERGENCY MEDICINE

## 2022-03-04 PROCEDURE — 99283 EMERGENCY DEPT VISIT LOW MDM: CPT

## 2022-03-04 RX ORDER — BENZONATATE 100 MG/1
100 CAPSULE ORAL EVERY 8 HOURS
Qty: 21 CAPSULE | Refills: 0 | Status: SHIPPED | OUTPATIENT
Start: 2022-03-04

## 2022-03-04 RX ORDER — ONDANSETRON 4 MG/1
4 TABLET, ORALLY DISINTEGRATING ORAL EVERY 6 HOURS PRN
Qty: 20 TABLET | Refills: 0 | Status: SHIPPED | OUTPATIENT
Start: 2022-03-04

## 2022-03-04 RX ORDER — IBUPROFEN 600 MG/1
600 TABLET ORAL ONCE
Status: COMPLETED | OUTPATIENT
Start: 2022-03-04 | End: 2022-03-04

## 2022-03-04 RX ORDER — ONDANSETRON 4 MG/1
4 TABLET, ORALLY DISINTEGRATING ORAL ONCE
Status: COMPLETED | OUTPATIENT
Start: 2022-03-04 | End: 2022-03-04

## 2022-03-04 RX ORDER — ACETAMINOPHEN 325 MG/1
975 TABLET ORAL ONCE
Status: COMPLETED | OUTPATIENT
Start: 2022-03-04 | End: 2022-03-04

## 2022-03-04 RX ADMIN — ONDANSETRON 4 MG: 4 TABLET, ORALLY DISINTEGRATING ORAL at 17:41

## 2022-03-04 RX ADMIN — IBUPROFEN 600 MG: 600 TABLET ORAL at 17:41

## 2022-03-04 RX ADMIN — ACETAMINOPHEN 975 MG: 325 TABLET, FILM COATED ORAL at 17:40

## 2022-03-04 NOTE — Clinical Note
Tati Hurst was seen and treated in our emergency department on 3/4/2022  Diagnosis:     Rylan Silvestre  may return to work on return date  She may return on this date: 03/07/2022         If you have any questions or concerns, please don't hesitate to call        Marcos Barbosa PA-C    ______________________________           _______________          _______________  Hospital Representative                              Date                                Time

## 2022-03-04 NOTE — ED PROVIDER NOTES
History  Chief Complaint   Patient presents with    URI     chills, vomiting, migraine, and body aches for two days     Patient is a 81-year-old female presenting to the ED for evaluation of flu-like symptoms x2 days  Patient has been experiencing subjective fevers, chills, cough, N/V, headaches and body aches  Cough is nonproductive and she denies any hemoptysis, chest pain or SOB  She had a few episodes of nonbloody/nonbilious vomiting today and was not able to eat/drink secondary to nausea  She reports mild generalized abdominal discomfort but no severe or localized pain  Denies diarrhea, melena, bloody stools, constipation or urinary symptoms  She has been taking Mucinex with some relief  She denies any close contacts with similar symptoms or known COVID exposures  She did not receive her COVID vaccinations  Prior to Admission Medications   Prescriptions Last Dose Informant Patient Reported? Taking?    ARIPiprazole (ABILIFY) 10 mg tablet   Yes No   Sig: Take 10 mg by mouth daily   LORazepam (ATIVAN) 1 mg tablet   Yes No   Sig: Take 1 mg by mouth daily at bedtime   Nebulizers (NEBULIZER COMPRESSOR) MISC   No No   Sig: Use as directed   SUMAtriptan (IMITREX) 50 mg tablet   No No   Sig: Take 1 tablet (50 mg total) by mouth once as needed for migraine for up to 1 dose   albuterol (2 5 mg/3 mL) 0 083 % nebulizer solution   No No   Sig: Take 1 vial (2 5 mg total) by nebulization every 6 (six) hours as needed for wheezing or shortness of breath   albuterol (Ventolin HFA) 90 mcg/act inhaler   No No   Sig: Inhale 2 puffs every 4 (four) hours as needed for wheezing or shortness of breath   atorvastatin (LIPITOR) 10 mg tablet   No No   Sig: Take 1 tablet (10 mg total) by mouth daily   diclofenac (VOLTAREN) 75 mg EC tablet   No No   Sig: Take 1 tablet (75 mg total) by mouth 2 (two) times a day With food   diclofenac sodium (VOLTAREN) 1 %   No No   Sig: Apply 2 g topically 4 (four) times a day   famotidine (PEPCID) 20 mg tablet   No No   Sig: Take 1 tablet (20 mg total) by mouth 2 (two) times a day   magnesium oxide (MAG-OX) 400 mg   No No   Sig: Take 1 tablet (400 mg total) by mouth daily   metoclopramide (REGLAN) 10 mg tablet   No No   Sig: Take 1 tablet (10 mg total) by mouth every 6 (six) hours as needed (n/v) for up to 30 doses   montelukast (SINGULAIR) 10 mg tablet   No No   Sig: Take 1 tablet (10 mg total) by mouth daily   naproxen (NAPROSYN) 500 mg tablet   No No   Sig: Take 1 tablet (500 mg total) by mouth 2 (two) times a day with meals   topiramate (Topamax) 50 MG tablet   No No   Sig: Take 1 tablet (50 mg total) by mouth 2 (two) times a day      Facility-Administered Medications: None       Past Medical History:   Diagnosis Date    Anxiety     Asthma     Bipolar 1 disorder (HCC)     Depression     GERD (gastroesophageal reflux disease)     Hypercholesterolemia        Past Surgical History:   Procedure Laterality Date    CHOLECYSTECTOMY      FL INJECTION RIGHT HIP (ARTHROGRAM)  11/23/2020    FL INJECTION RIGHT HIP (NON ARTHROGRAM)  8/10/2020    TONSILLECTOMY      TUBAL LIGATION         Family History   Problem Relation Age of Onset    Mental illness Mother     Depression Mother     Cancer Mother     Hyperlipidemia Mother     Asthma Father     Arthritis Father     Cancer Brother     Psoriasis Daughter      I have reviewed and agree with the history as documented  E-Cigarette/Vaping    E-Cigarette Use Never User      E-Cigarette/Vaping Substances    Nicotine No     THC No     CBD No     Flavoring No     Other No     Unknown No      Social History     Tobacco Use    Smoking status: Current Every Day Smoker     Packs/day: 0 50     Types: Cigarettes    Smokeless tobacco: Never Used   Vaping Use    Vaping Use: Never used   Substance Use Topics    Alcohol use: Never    Drug use: Not Currently       Review of Systems   Constitutional: Positive for chills and fatigue   Negative for appetite change and fever  HENT: Negative for congestion, rhinorrhea, sinus pressure, sinus pain and sore throat  Eyes: Negative for photophobia and visual disturbance  Respiratory: Positive for cough  Negative for shortness of breath and wheezing  Cardiovascular: Negative for chest pain, palpitations and leg swelling  Gastrointestinal: Positive for nausea and vomiting  Negative for abdominal pain, blood in stool, constipation and diarrhea  Genitourinary: Negative for difficulty urinating, dysuria, flank pain, frequency, hematuria and urgency  Musculoskeletal: Positive for myalgias  Negative for arthralgias, back pain, joint swelling and neck pain  Skin: Negative for rash  Neurological: Positive for headaches  Negative for dizziness, syncope, weakness and light-headedness  All other systems reviewed and are negative  Physical Exam  Physical Exam  Vitals and nursing note reviewed  Constitutional:       General: She is awake  Appearance: Normal appearance  She is well-developed  She is not toxic-appearing or diaphoretic  HENT:      Head: Normocephalic and atraumatic  Right Ear: External ear normal       Left Ear: External ear normal       Nose: Nose normal  No congestion or rhinorrhea  Mouth/Throat:      Lips: Pink  Mouth: Mucous membranes are moist       Pharynx: Oropharynx is clear  Uvula midline  No oropharyngeal exudate, posterior oropharyngeal erythema or uvula swelling  Tonsils: No tonsillar exudate or tonsillar abscesses  Comments: Moist mucous membranes  No pharyngeal erythema or tonsillar exudate  Eyes:      General: Lids are normal  No scleral icterus  Conjunctiva/sclera: Conjunctivae normal       Pupils: Pupils are equal, round, and reactive to light  Neck:      Meningeal: Brudzinski's sign and Kernig's sign absent  Comments: No meningeal signs/nuchal rigidity   Cardiovascular:      Rate and Rhythm: Regular rhythm   Tachycardia present  Pulses: Normal pulses  Radial pulses are 2+ on the right side and 2+ on the left side  Heart sounds: Normal heart sounds, S1 normal and S2 normal       Comments: Mild tachycardia, improved with treatment of low-grade fever  Pulmonary:      Effort: Pulmonary effort is normal  No accessory muscle usage  Breath sounds: Normal breath sounds  No stridor  No decreased breath sounds, wheezing, rhonchi or rales  Comments: Lungs are clear and equal to auscultation bilaterally  No wheezing, rhonchi or rales  SpO2 96-98% on room air  No work of breathing or tachypnea  Abdominal:      General: Abdomen is flat  Bowel sounds are normal  There is no distension  Palpations: Abdomen is soft  Tenderness: There is no abdominal tenderness  There is no right CVA tenderness, left CVA tenderness, guarding or rebound  Comments: Abdomen is soft, non-tender and non-distended  No rebound tenderness, guarding or rigidity  Bowel sounds normal throughout  Musculoskeletal:      Cervical back: Full passive range of motion without pain and neck supple  No signs of trauma  No pain with movement  Right lower leg: No edema  Left lower leg: No edema  Lymphadenopathy:      Cervical: No cervical adenopathy  Skin:     General: Skin is warm and dry  Capillary Refill: Capillary refill takes less than 2 seconds  Coloration: Skin is not cyanotic, jaundiced or pale  Neurological:      Mental Status: She is alert and oriented to person, place, and time  GCS: GCS eye subscore is 4  GCS verbal subscore is 5  GCS motor subscore is 6  Gait: Gait normal    Psychiatric:         Mood and Affect: Mood normal          Speech: Speech normal          Behavior: Behavior is cooperative           Vital Signs  ED Triage Vitals [03/04/22 1701]   Temperature Pulse Respirations Blood Pressure SpO2   99 4 °F (37 4 °C) (!) 115 18 146/82 96 %      Temp Source Heart Rate Source Patient Position - Orthostatic VS BP Location FiO2 (%)   Tympanic Monitor Sitting Right arm --      Pain Score       7           Vitals:    03/04/22 1701 03/04/22 1812   BP: 146/82    Pulse: (!) 115 (!) 107   Patient Position - Orthostatic VS: Sitting          Visual Acuity      ED Medications  Medications   ondansetron (ZOFRAN-ODT) dispersible tablet 4 mg (4 mg Oral Given 3/4/22 1741)   ibuprofen (MOTRIN) tablet 600 mg (600 mg Oral Given 3/4/22 1741)   acetaminophen (TYLENOL) tablet 975 mg (975 mg Oral Given 3/4/22 1740)       Diagnostic Studies  Results Reviewed     Procedure Component Value Units Date/Time    COVID/FLU/RSV [[id number]]  (Normal) Collected: 03/04/22 1716    Lab Status: Final result Specimen: Nares from Nose Updated: 03/04/22 1804     SARS-CoV-2 Negative     INFLUENZA A PCR Negative     INFLUENZA B PCR Negative     RSV PCR Negative    Narrative:      FOR PEDIATRIC PATIENTS - copy/paste COVID Guidelines URL to browser: [email or web address]  [employer]    SARS-CoV-2 assay is a Nucleic Acid Amplification assay intended for the  qualitative detection of nucleic acid from SARS-CoV-2 in nasopharyngeal  swabs  Results are for the presumptive identification of SARS-CoV-2 RNA  Positive results are indicative of infection with SARS-CoV-2, the virus  causing COVID-19, but do not rule out bacterial infection or co-infection  with other viruses  Laboratories within the [place] and its  territories are required to report all positive results to the appropriate  public health authorities  Negative results do not preclude SARS-CoV-2  infection and should not be used as the sole basis for treatment or other  patient management decisions  Negative results must be combined with  clinical observations, patient history, and epidemiological information  This test has not been FDA cleared or approved      This test has been authorized by FDA under an Emergency Use Authorization  (EUA)  This test is only authorized for the duration of time the  declaration that circumstances exist justifying the authorization of the  emergency use of an in vitro diagnostic tests for detection of SARS-CoV-2  virus and/or diagnosis of COVID-19 infection under section 564(b)(1) of  the Act, 21 U  S C  940YZL-7(U)(7), unless the authorization is terminated  or revoked sooner  The test has been validated but independent review by FDA  and CLIA is pending  Test performed using [employer] Gene[employer]pert: This RT-PCR assay targets N2,  a region unique to SARS-CoV-2  A conserved region in the E-gene was chosen  for pan-Sarbecovirus detection which includes SARS-CoV-2  No orders to display              Procedures  Procedures         ED Course                                             MDM  Number of Diagnoses or Management Options  Flu-like symptoms  Nausea and vomiting  URI (upper respiratory infection)  Diagnosis management comments: Patient is a 60-year-old female presenting to the ED for evaluation of flu-like symptoms x2 days  COVID/flu testing negative  Noted improvement of mild tachycardia after administration of antipyretics; suspect tachycardia secondary to low-grade fever/viral illness as patient has no chest pain or SOB  Patient feels improved after tylenol, motrin and zofran  She was able to drink water and eat crackers prior to discharge with no subsequent episodes of vomiting  Patient feels comfortable with discharge and supportive care measures  We discussed strict ED return precautions in detail and I advised prompt follow-up with PCP  The management plan was discussed in detail with the patient at bedside and all questions were answered  Prior to discharge, verbal and written instructions provided  The patient verbalized understanding of our discussion and plan of care, and agrees to return to the Emergency Department for concerns and progression of illness         Amount and/or Complexity of Data Reviewed  Clinical lab tests: ordered and reviewed    Patient Progress  Patient progress: stable      Disposition  Final diagnoses:   URI (upper respiratory infection)   Flu-like symptoms   Nausea and vomiting     Time reflects when diagnosis was documented in both MDM as applicable and the Disposition within this note     Time User Action Codes Description Comment    3/4/2022  6:12 PM Watt Grayson Add [J06 9] URI (upper respiratory infection)     3/4/2022  6:12 PM Watt Grayson Add [R68 89] Flu-like symptoms     3/4/2022  6:12 PM Debo Reece Add [R11 2] Nausea and vomiting       ED Disposition     ED Disposition Condition Date/Time Comment    Discharge Stable Fri Mar 4, 2022  6:12 PM Manish Nicolas discharge to home/self care              Follow-up Information     Follow up With Specialties Details Why Contact Info Additional [address], [facility] Schedule an appointment as soon as possible for a visit   [address] [address]       [facility] Emergency Department Emergency Medicine  If symptoms worsen [place] [phone]  [address] [place] [address] Emergency Department, [address], [address]          Discharge Medication List as of 3/4/2022  6:13 PM      START taking these medications    Details   benzonatate (TESSALON PERLES) 100 mg capsule Take 1 capsule (100 mg total) by mouth every 8 (eight) hours, Starting Fri 3/4/2022, Normal      ondansetron (Zofran ODT) 4 mg disintegrating tablet Take 1 tablet (4 mg total) by mouth every 6 (six) hours as needed for nausea or vomiting, Starting Fri 3/4/2022, Normal         CONTINUE these medications which have NOT CHANGED    Details   albuterol (2 5 mg/3 mL) 0 083 % nebulizer solution Take 1 vial (2 5 mg total) by nebulization every 6 (six) hours as needed for wheezing or shortness of breath, Starting Thu 6/11/2020, Normal      albuterol (Ventolin HFA) 90 mcg/act inhaler Inhale 2 puffs every 4 (four) hours as needed for wheezing or shortness of breath, Starting Thu 6/11/2020, Normal      ARIPiprazole (ABILIFY) 10 mg tablet Take 10 mg by mouth daily, Historical Med      atorvastatin (LIPITOR) 10 mg tablet Take 1 tablet (10 mg total) by mouth daily, Starting Wed 9/30/2020, Normal      diclofenac (VOLTAREN) 75 mg EC tablet Take 1 tablet (75 mg total) by mouth 2 (two) times a day With food, Starting Tue 12/8/2020, Normal      diclofenac sodium (VOLTAREN) 1 % Apply 2 g topically 4 (four) times a day, Starting Thu 6/11/2020, Normal      famotidine (PEPCID) 20 mg tablet Take 1 tablet (20 mg total) by mouth 2 (two) times a day, Starting Mon 8/17/2020, Normal      LORazepam (ATIVAN) 1 mg tablet Take 1 mg by mouth daily at bedtime, Historical Med      magnesium oxide (MAG-OX) 400 mg Take 1 tablet (400 mg total) by mouth daily, Starting Tue 10/27/2020, Normal      metoclopramide (REGLAN) 10 mg tablet Take 1 tablet (10 mg total) by mouth every 6 (six) hours as needed (n/v) for up to 30 doses, Starting Wed 11/11/2020, Normal      montelukast (SINGULAIR) 10 mg tablet Take 1 tablet (10 mg total) by mouth daily, Starting Thu 6/11/2020, Normal      naproxen (NAPROSYN) 500 mg tablet Take 1 tablet (500 mg total) by mouth 2 (two) times a day with meals, Starting Thu 3/11/2021, Normal      Nebulizers (NEBULIZER COMPRESSOR) MISC Use as directed, Normal      SUMAtriptan (IMITREX) 50 mg tablet Take 1 tablet (50 mg total) by mouth once as needed for migraine for up to 1 dose, Starting Tue 10/27/2020, Normal      topiramate (Topamax) 50 MG tablet Take 1 tablet (50 mg total) by mouth 2 (two) times a day, Starting Thu 6/11/2020, Normal             No discharge procedures on file      PDMP Review     None          ED Provider  Electronically Signed by           Greta Capps PA-C  03/04/22 2009

## 2022-03-08 ENCOUNTER — TELEPHONE (OUTPATIENT)
Dept: FAMILY MEDICINE CLINIC | Facility: CLINIC | Age: 37
End: 2022-03-08

## 2022-03-08 NOTE — TELEPHONE ENCOUNTER
Phone number listed is no longer in service  Was unable to reach patient to schedule her for an AWV    A letter was sent to her to contact our office

## 2022-07-19 ENCOUNTER — APPOINTMENT (EMERGENCY)
Dept: RADIOLOGY | Facility: HOSPITAL | Age: 37
End: 2022-07-19
Payer: COMMERCIAL

## 2022-07-19 ENCOUNTER — HOSPITAL ENCOUNTER (EMERGENCY)
Facility: HOSPITAL | Age: 37
Discharge: HOME | End: 2022-07-19
Attending: EMERGENCY MEDICINE | Admitting: EMERGENCY MEDICINE
Payer: COMMERCIAL

## 2022-07-19 VITALS
WEIGHT: 293 LBS | OXYGEN SATURATION: 97 % | TEMPERATURE: 97.2 F | BODY MASS INDEX: 49.78 KG/M2 | DIASTOLIC BLOOD PRESSURE: 79 MMHG | RESPIRATION RATE: 18 BRPM | SYSTOLIC BLOOD PRESSURE: 134 MMHG | HEART RATE: 88 BPM

## 2022-07-19 DIAGNOSIS — E87.6 HYPOKALEMIA: ICD-10-CM

## 2022-07-19 DIAGNOSIS — R68.89 FLU-LIKE SYMPTOMS: Primary | ICD-10-CM

## 2022-07-19 LAB
ANION GAP SERPL CALCULATED.3IONS-SCNC: 7 MMOL/L (ref 4–13)
BASOPHILS # BLD AUTO: 0.05 THOUSANDS/ΜL (ref 0–0.1)
BASOPHILS NFR BLD AUTO: 0 % (ref 0–1)
BUN SERPL-MCNC: 4 MG/DL (ref 5–25)
CALCIUM SERPL-MCNC: 9 MG/DL (ref 8.3–10.1)
CHLORIDE SERPL-SCNC: 102 MMOL/L (ref 96–108)
CO2 SERPL-SCNC: 30 MMOL/L (ref 21–32)
CREAT SERPL-MCNC: 0.76 MG/DL (ref 0.6–1.3)
EOSINOPHIL # BLD AUTO: 0.17 THOUSAND/ΜL (ref 0–0.61)
EOSINOPHIL NFR BLD AUTO: 1 % (ref 0–6)
ERYTHROCYTE [DISTWIDTH] IN BLOOD BY AUTOMATED COUNT: 13.4 % (ref 11.6–15.1)
FLUAV RNA RESP QL NAA+PROBE: NEGATIVE
FLUBV RNA RESP QL NAA+PROBE: NEGATIVE
GFR SERPL CREATININE-BSD FRML MDRD: 101 ML/MIN/1.73SQ M
GLUCOSE SERPL-MCNC: 100 MG/DL (ref 65–140)
HCT VFR BLD AUTO: 42.5 % (ref 34.8–46.1)
HGB BLD-MCNC: 14 G/DL (ref 11.5–15.4)
IMM GRANULOCYTES # BLD AUTO: 0.06 THOUSAND/UL (ref 0–0.2)
IMM GRANULOCYTES NFR BLD AUTO: 1 % (ref 0–2)
LYMPHOCYTES # BLD AUTO: 1.89 THOUSANDS/ΜL (ref 0.6–4.47)
LYMPHOCYTES NFR BLD AUTO: 16 % (ref 14–44)
MCH RBC QN AUTO: 28.5 PG (ref 26.8–34.3)
MCHC RBC AUTO-ENTMCNC: 32.9 G/DL (ref 31.4–37.4)
MCV RBC AUTO: 86 FL (ref 82–98)
MONOCYTES # BLD AUTO: 0.64 THOUSAND/ΜL (ref 0.17–1.22)
MONOCYTES NFR BLD AUTO: 5 % (ref 4–12)
NEUTROPHILS # BLD AUTO: 9.01 THOUSANDS/ΜL (ref 1.85–7.62)
NEUTS SEG NFR BLD AUTO: 77 % (ref 43–75)
NRBC BLD AUTO-RTO: 0 /100 WBCS
PLATELET # BLD AUTO: 259 THOUSANDS/UL (ref 149–390)
PMV BLD AUTO: 9.6 FL (ref 8.9–12.7)
POTASSIUM SERPL-SCNC: 3.3 MMOL/L (ref 3.5–5.3)
RBC # BLD AUTO: 4.92 MILLION/UL (ref 3.81–5.12)
RSV RNA RESP QL NAA+PROBE: NEGATIVE
SARS-COV-2 RNA RESP QL NAA+PROBE: NEGATIVE
SODIUM SERPL-SCNC: 139 MMOL/L (ref 135–147)
WBC # BLD AUTO: 11.82 THOUSAND/UL (ref 4.31–10.16)

## 2022-07-19 PROCEDURE — 36415 COLL VENOUS BLD VENIPUNCTURE: CPT | Performed by: EMERGENCY MEDICINE

## 2022-07-19 PROCEDURE — 99285 EMERGENCY DEPT VISIT HI MDM: CPT | Performed by: EMERGENCY MEDICINE

## 2022-07-19 PROCEDURE — 93005 ELECTROCARDIOGRAM TRACING: CPT

## 2022-07-19 PROCEDURE — 0241U HB NFCT DS VIR RESP RNA 4 TRGT: CPT | Performed by: EMERGENCY MEDICINE

## 2022-07-19 PROCEDURE — 80048 BASIC METABOLIC PNL TOTAL CA: CPT | Performed by: EMERGENCY MEDICINE

## 2022-07-19 PROCEDURE — 85025 COMPLETE CBC W/AUTO DIFF WBC: CPT | Performed by: EMERGENCY MEDICINE

## 2022-07-19 PROCEDURE — 96374 THER/PROPH/DIAG INJ IV PUSH: CPT

## 2022-07-19 PROCEDURE — 71045 X-RAY EXAM CHEST 1 VIEW: CPT

## 2022-07-19 PROCEDURE — 96361 HYDRATE IV INFUSION ADD-ON: CPT

## 2022-07-19 PROCEDURE — 99284 EMERGENCY DEPT VISIT MOD MDM: CPT

## 2022-07-19 RX ORDER — IBUPROFEN 400 MG/1
400 TABLET ORAL ONCE
Status: COMPLETED | OUTPATIENT
Start: 2022-07-19 | End: 2022-07-19

## 2022-07-19 RX ORDER — ACETAMINOPHEN 325 MG/1
650 TABLET ORAL ONCE
Status: COMPLETED | OUTPATIENT
Start: 2022-07-19 | End: 2022-07-19

## 2022-07-19 RX ORDER — POTASSIUM CHLORIDE 20 MEQ/1
40 TABLET, EXTENDED RELEASE ORAL ONCE
Status: COMPLETED | OUTPATIENT
Start: 2022-07-19 | End: 2022-07-19

## 2022-07-19 RX ORDER — DEXAMETHASONE SODIUM PHOSPHATE 4 MG/ML
8 INJECTION, SOLUTION INTRA-ARTICULAR; INTRALESIONAL; INTRAMUSCULAR; INTRAVENOUS; SOFT TISSUE ONCE
Status: COMPLETED | OUTPATIENT
Start: 2022-07-19 | End: 2022-07-19

## 2022-07-19 RX ORDER — NAPROXEN 500 MG/1
500 TABLET ORAL 2 TIMES DAILY WITH MEALS
Qty: 30 TABLET | Refills: 0 | Status: SHIPPED | OUTPATIENT
Start: 2022-07-19 | End: 2022-10-30 | Stop reason: ALTCHOICE

## 2022-07-19 RX ADMIN — DEXAMETHASONE SODIUM PHOSPHATE 8 MG: 4 INJECTION, SOLUTION INTRAMUSCULAR; INTRAVENOUS at 18:19

## 2022-07-19 RX ADMIN — SODIUM CHLORIDE 1000 ML: 0.9 INJECTION, SOLUTION INTRAVENOUS at 18:11

## 2022-07-19 RX ADMIN — POTASSIUM CHLORIDE 40 MEQ: 1500 TABLET, EXTENDED RELEASE ORAL at 19:27

## 2022-07-19 RX ADMIN — IBUPROFEN 400 MG: 400 TABLET, FILM COATED ORAL at 18:11

## 2022-07-19 RX ADMIN — ACETAMINOPHEN 650 MG: 325 TABLET, FILM COATED ORAL at 18:11

## 2022-07-19 NOTE — Clinical Note
Anna Genao was seen and treated in our emergency department on 7/19/2022  Diagnosis:     Woody Salter    She may return on this date:     Patient is seen and examined in the emergency department on 07/19/2022  Flu, COVID, RSV negative  May return to work once fever free times 24 hours  If you have any questions or concerns, please don't hesitate to call        Ciarra Camacho,     ______________________________           _______________          _______________  Hospital Representative                              Date                                Time

## 2022-07-19 NOTE — ED PROVIDER NOTES
History  Chief Complaint   Patient presents with    Generalized Body Aches     Patient reports onset of body aches sore throat chills and headache last night  No known sick contacts  Naima Guerin cough/SOB       History provided by:  Patient  URI  Presenting symptoms: congestion, cough, ear pain, fatigue, fever, rhinorrhea and sore throat    Presenting symptoms: no facial pain    Severity:  Moderate  Onset quality:  Gradual  Duration:  1 day  Timing:  Constant  Progression:  Worsening  Chronicity:  New  Relieved by:  Nothing  Worsened by:  Nothing  Ineffective treatments:  None tried  Associated symptoms: headaches and myalgias    Associated symptoms: no arthralgias, no neck pain, no sinus pain, no sneezing, no swollen glands and no wheezing    Risk factors: chronic respiratory disease    Risk factors: no immunosuppression, no recent illness, no recent travel and no sick contacts        Prior to Admission Medications   Prescriptions Last Dose Informant Patient Reported? Taking?    ARIPiprazole (ABILIFY) 10 mg tablet   Yes No   Sig: Take 10 mg by mouth daily   LORazepam (ATIVAN) 1 mg tablet   Yes No   Sig: Take 1 mg by mouth daily at bedtime   Nebulizers (NEBULIZER COMPRESSOR) MISC   No No   Sig: Use as directed   SUMAtriptan (IMITREX) 50 mg tablet   No No   Sig: Take 1 tablet (50 mg total) by mouth once as needed for migraine for up to 1 dose   albuterol (2 5 mg/3 mL) 0 083 % nebulizer solution   No No   Sig: Take 1 vial (2 5 mg total) by nebulization every 6 (six) hours as needed for wheezing or shortness of breath   albuterol (Ventolin HFA) 90 mcg/act inhaler   No No   Sig: Inhale 2 puffs every 4 (four) hours as needed for wheezing or shortness of breath   atorvastatin (LIPITOR) 10 mg tablet   No No   Sig: Take 1 tablet (10 mg total) by mouth daily   benzonatate (TESSALON PERLES) 100 mg capsule   No No   Sig: Take 1 capsule (100 mg total) by mouth every 8 (eight) hours   diclofenac (VOLTAREN) 75 mg EC tablet   No No Sig: Take 1 tablet (75 mg total) by mouth 2 (two) times a day With food   diclofenac sodium (VOLTAREN) 1 %   No No   Sig: Apply 2 g topically 4 (four) times a day   famotidine (PEPCID) 20 mg tablet   No No   Sig: Take 1 tablet (20 mg total) by mouth 2 (two) times a day   magnesium oxide (MAG-OX) 400 mg   No No   Sig: Take 1 tablet (400 mg total) by mouth daily   metoclopramide (REGLAN) 10 mg tablet   No No   Sig: Take 1 tablet (10 mg total) by mouth every 6 (six) hours as needed (n/v) for up to 30 doses   montelukast (SINGULAIR) 10 mg tablet   No No   Sig: Take 1 tablet (10 mg total) by mouth daily   naproxen (NAPROSYN) 500 mg tablet   No No   Sig: Take 1 tablet (500 mg total) by mouth 2 (two) times a day with meals   ondansetron (Zofran ODT) 4 mg disintegrating tablet   No No   Sig: Take 1 tablet (4 mg total) by mouth every 6 (six) hours as needed for nausea or vomiting   topiramate (Topamax) 50 MG tablet   No No   Sig: Take 1 tablet (50 mg total) by mouth 2 (two) times a day      Facility-Administered Medications: None       Past Medical History:   Diagnosis Date    Anxiety     Asthma     Bipolar 1 disorder (HCC)     Depression     GERD (gastroesophageal reflux disease)     Hypercholesterolemia        Past Surgical History:   Procedure Laterality Date    CHOLECYSTECTOMY      FL INJECTION RIGHT HIP (ARTHROGRAM)  11/23/2020    FL INJECTION RIGHT HIP (NON ARTHROGRAM)  8/10/2020    TONSILLECTOMY      TUBAL LIGATION         Family History   Problem Relation Age of Onset    Mental illness Mother     Depression Mother     Cancer Mother     Hyperlipidemia Mother     Asthma Father     Arthritis Father     Cancer Brother     Psoriasis Daughter      I have reviewed and agree with the history as documented      E-Cigarette/Vaping    E-Cigarette Use Never User      E-Cigarette/Vaping Substances    Nicotine No     THC No     CBD No     Flavoring No     Other No     Unknown No      Social History Tobacco Use    Smoking status: Current Every Day Smoker     Packs/day: 0 50     Types: Cigarettes    Smokeless tobacco: Never Used   Vaping Use    Vaping Use: Never used   Substance Use Topics    Alcohol use: Never    Drug use: Not Currently       Review of Systems   Constitutional: Positive for fatigue and fever  Negative for chills  HENT: Positive for congestion, ear pain, rhinorrhea and sore throat  Negative for drooling, ear discharge, facial swelling, mouth sores, postnasal drip, sinus pain, sneezing, trouble swallowing and voice change  Eyes: Negative for pain and visual disturbance  Respiratory: Positive for cough  Negative for shortness of breath and wheezing  Cardiovascular: Negative for chest pain and palpitations  Gastrointestinal: Negative for abdominal pain, diarrhea, nausea and vomiting  Genitourinary: Negative for dysuria, flank pain and hematuria  Musculoskeletal: Positive for myalgias  Negative for arthralgias, back pain and neck pain  Skin: Negative for color change and rash  Neurological: Positive for headaches  Negative for seizures and syncope  All other systems reviewed and are negative  Physical Exam  Physical Exam  Vitals and nursing note reviewed  Exam conducted with a chaperone present  Constitutional:       Appearance: Normal appearance  HENT:      Head: Normocephalic and atraumatic  Right Ear: External ear normal       Left Ear: External ear normal       Nose: Nose normal       Mouth/Throat:      Mouth: Mucous membranes are moist       Pharynx: Oropharynx is clear  Posterior oropharyngeal erythema present  No oropharyngeal exudate  Eyes:      General:         Right eye: No discharge  Left eye: No discharge  Conjunctiva/sclera: Conjunctivae normal       Pupils: Pupils are equal, round, and reactive to light  Cardiovascular:      Rate and Rhythm: Regular rhythm  Tachycardia present  Pulses: Normal pulses        Heart sounds: Normal heart sounds  No murmur heard  Pulmonary:      Effort: Pulmonary effort is normal  No respiratory distress  Breath sounds: No wheezing, rhonchi or rales  Abdominal:      General: Abdomen is flat  There is no distension  Musculoskeletal:      Cervical back: Normal range of motion and neck supple  No rigidity or tenderness  Right lower leg: No edema  Left lower leg: No edema  Skin:     General: Skin is warm and dry  Capillary Refill: Capillary refill takes less than 2 seconds  Neurological:      General: No focal deficit present  Mental Status: She is alert  Mental status is at baseline           Vital Signs  ED Triage Vitals   Temperature Pulse Respirations Blood Pressure SpO2   07/19/22 1728 07/19/22 1725 07/19/22 1725 07/19/22 1725 07/19/22 1725   (!) 101 7 °F (38 7 °C) (!) 123 18 160/81 99 %      Temp Source Heart Rate Source Patient Position - Orthostatic VS BP Location FiO2 (%)   07/19/22 1728 07/19/22 1725 07/19/22 1725 07/19/22 1725 --   Oral Monitor Lying Right arm       Pain Score       07/19/22 1811       Med Not Given for Pain - for MAR use only           Vitals:    07/19/22 1725 07/19/22 1927   BP: 160/81 134/79   Pulse: (!) 123 88   Patient Position - Orthostatic VS: Lying Sitting         Visual Acuity      ED Medications  Medications   acetaminophen (TYLENOL) tablet 650 mg (650 mg Oral Given 7/19/22 1811)   ibuprofen (MOTRIN) tablet 400 mg (400 mg Oral Given 7/19/22 1811)   sodium chloride 0 9 % bolus 1,000 mL (0 mL Intravenous Stopped 7/19/22 1909)   dexamethasone (DECADRON) injection 8 mg (8 mg Intravenous Given 7/19/22 1819)   potassium chloride (K-DUR,KLOR-CON) CR tablet 40 mEq (40 mEq Oral Given 7/19/22 1927)       Diagnostic Studies  Results Reviewed     Procedure Component Value Units Date/Time    Basic metabolic panel [[id number]]  (Abnormal) Collected: 07/19/22 1808    Lab Status: Final result Specimen: Blood from Arm, Left Updated: 07/19/22 5535 Sodium 139 mmol/L      Potassium 3 3 mmol/L      Chloride 102 mmol/L      CO2 30 mmol/L      ANION GAP 7 mmol/L      BUN 4 mg/dL      Creatinine 0 76 mg/dL      Glucose 100 mg/dL      Calcium 9 0 mg/dL      eGFR 101 ml/min/1 73sq m     Narrative:      [facility] guidelines for Chronic Kidney Disease (CKD):     Stage 1 with normal or high GFR (GFR > 90 mL/min/1 73 square meters)    Stage 2 Mild CKD (GFR = 60-89 mL/min/1 73 square meters)    Stage 3A Moderate CKD (GFR = 45-59 mL/min/1 73 square meters)    Stage 3B Moderate CKD (GFR = 30-44 mL/min/1 73 square meters)    Stage 4 Severe CKD (GFR = 15-29 mL/min/1 73 square meters)    Stage 5 End Stage CKD (GFR <15 mL/min/1 73 square meters)  Note: GFR calculation is accurate only with a steady state creatinine    FLU/RSV/COVID - if FLU/RSV clinically relevant [[id number]]  (Normal) Collected: 07/19/22 1738    Lab Status: Final result Specimen: Nasopharyngeal Swab Updated: 07/19/22 1826     SARS-CoV-2 Negative     INFLUENZA A PCR Negative     INFLUENZA B PCR Negative     RSV PCR Negative    Narrative:      FOR PEDIATRIC PATIENTS - copy/paste COVID Guidelines URL to browser: [email or web address]    SARS-CoV-2 assay is a Nucleic Acid Amplification assay intended for the  qualitative detection of nucleic acid from SARS-CoV-2 in nasopharyngeal  swabs  Results are for the presumptive identification of SARS-CoV-2 RNA  Positive results are indicative of infection with SARS-CoV-2, the virus  causing COVID-19, but do not rule out bacterial infection or co-infection  with other viruses  Laboratories within the [place] and its  territories are required to report all positive results to the appropriate  public health authorities  Negative results do not preclude SARS-CoV-2  infection and should not be used as the sole basis for treatment or other  patient management decisions   Negative results must be combined with  clinical observations, patient history, and epidemiological information  This test has not been FDA cleared or approved  This test has been authorized by FDA under an Emergency Use Authorization  (EUA)  This test is only authorized for the duration of time the  declaration that circumstances exist justifying the authorization of the  emergency use of an in vitro diagnostic tests for detection of SARS-CoV-2  virus and/or diagnosis of COVID-19 infection under section 564(b)(1) of  the Act, 21 U  S C  995QZD-7(D)(7), unless the authorization is terminated  or revoked sooner  The test has been validated but independent review by FDA  and CLIA is pending  Test performed using [employer] GeneXpert: This RT-PCR assay targets N2,  a region unique to SARS-CoV-2  A conserved region in the E-gene was chosen  for pan-Sarbecovirus detection which includes SARS-CoV-2  CBC and differential [[id number]]  (Abnormal) Collected: 07/19/22 1808    Lab Status: Final result Specimen: Blood from Arm, Left Updated: 07/19/22 1814     WBC 11 82 Thousand/uL      RBC 4 92 Million/uL      Hemoglobin 14 0 g/dL      Hematocrit 42 5 %      MCV 86 fL      MCH 28 5 pg      MCHC 32 9 g/dL      RDW 13 4 %      MPV 9 6 fL      Platelets 463 Thousands/uL      nRBC 0 /100 WBCs      Neutrophils Relative 77 %      Immat GRANS % 1 %      Lymphocytes Relative 16 %      Monocytes Relative 5 %      Eosinophils Relative 1 %      Basophils Relative 0 %      Neutrophils Absolute 9 01 Thousands/µL      Immature Grans Absolute 0 06 Thousand/uL      Lymphocytes Absolute 1 89 Thousands/µL      Monocytes Absolute 0 64 Thousand/µL      Eosinophils Absolute 0 17 Thousand/µL      Basophils Absolute 0 05 Thousands/µL                  XR chest 1 view portable   ED Interpretation by Naina Naranjo DO (07/19 1816)   No acute cardiopulmonary process seen by myself pending official Radiology report                   Procedures  Procedures ED Course  ED Course as of 07/19/22 1956 Tue Jul 19, 2022   1830 EKG interpreted by myself  EKG dated 07/19/2022 at 6:17 p m  Demonstrates sinus tachycardia 105 beats per minute, normal MN interval, normal QRS interval, normal QTC interval, no STEMI  1954 Temperature(!): 97 2 °F (36 2 °C)   1954 Pulse: 88                               SBIRT 20yo+    Flowsheet Row Most Recent Value   SBIRT (25 yo +)    In order to provide better care to our patients, we are screening all of our patients for alcohol and drug use  Would it be okay to ask you these screening questions? Yes Filed at: 07/19/2022 1818   Initial Alcohol Screen: US AUDIT-C     1  How often do you have a drink containing alcohol? 0 Filed at: 07/19/2022 1818   2  How many drinks containing alcohol do you have on a typical day you are drinking? 0 Filed at: 07/19/2022 1818   3b  FEMALE Any Age, or MALE 65+: How often do you have 4 or more drinks on one occassion? 0 Filed at: 07/19/2022 1818   Audit-C Score 0 Filed at: 07/19/2022 1818   YINKA: How many times in the past year have you    Used an illegal drug or used a prescription medication for non-medical reasons?  Never Filed at: 07/19/2022 1818                    MDM  Number of Diagnoses or Management Options  Flu-like symptoms: new and requires workup     Amount and/or Complexity of Data Reviewed  Clinical lab tests: ordered and reviewed  Tests in the radiology section of CPT®: ordered and reviewed  Tests in the medicine section of CPT®: reviewed and ordered  Decide to obtain previous medical records or to obtain history from someone other than the patient: yes  Review and summarize past medical records: yes  Independent visualization of images, tracings, or specimens: yes    Risk of Complications, Morbidity, and/or Mortality  Presenting problems: low  Diagnostic procedures: low  Management options: low    Patient Progress  Patient progress: stable  28-year-old female presenting to the ER for evaluation of 1 day of fevers, myalgias  Febrile here  Viral swabs negative  No clinical evidence of strep pharyngitis  No pneumonia on x-ray  Tachycardia resolved with IV fluids  Pain improved with medications  Decadron for suspected viral syndrome  No respiratory symptoms of significance at this time  No wheezing  Will instruct supportive care, return precautions, workup provided  Disposition  Final diagnoses:   Flu-like symptoms   Hypokalemia     Time reflects when diagnosis was documented in both MDM as applicable and the Disposition within this note     Time User Action Codes Description Comment    7/19/2022  6:15 PM Benja Stock [R68 [id number]] Flu-like symptoms     7/19/2022  7:04 PM Reshma Lindsay [E87 6] Hypokalemia       ED Disposition     ED Disposition   Discharge    Condition   Stable    Date/Time   Tue Jul 19, 2022  7:52 PM    Comment   Colletta Six discharge to home/self care  Follow-up Information     Follow up With Specialties Details Why Contact Info Additional [id number] [facility],[address], [address] [place] [address] Medicine Schedule an appointment as soon as possible for a visit   [address]       [facility] Emergency Department Emergency Medicine Go to  If symptoms worsen [facility] [id number]  [address] [place] [address] Emergency Department, [address], [address]          Patient's Medications   Discharge Prescriptions    NAPROXEN (NAPROSYN) 500 MG TABLET    Take 1 tablet (500 mg total) by mouth 2 (two) times a day with meals       Start Date: 7/19/2022 End Date: --       Order Dose: 500 mg       Quantity: 30 tablet    Refills: 0       No discharge procedures on file      PDMP Review     None          ED Provider  Electronically Signed by           Soheila Rea DO  07/19/22 1957

## 2022-07-19 NOTE — Clinical Note
Beltran Bingham was seen and treated in our emergency department on 7/19/2022  Diagnosis:     Adithya Lane    She may return on this date:     Patient is seen and examined in the emergency department on 07/19/2022  Flu, COVID, RSV negative  May return to work once fever free times 24 hours  If you have any questions or concerns, please don't hesitate to call        Jennifer Jimenez DO    ______________________________           _______________          _______________  Hospital Representative                              Date                                Time

## 2022-07-19 NOTE — DISCHARGE INSTRUCTIONS
Thank you for visiting the Emergency Department today  You are evaluated for febrile illness and body aches  Your COVID, flu, RSV tests were negative  Do not have evidence of strep throat on exam   Blood work was within normal limits other than a mildly low potassium which was treated in the ER  Still likely a viral syndrome based on the workup and exam   Did receive a dose of a steroid medication in the ER which will continue to treat you of the next few days  Take Tylenol and naproxen/ibuprofen for symptoms up to 4 times daily as directed  Return if symptoms worsen  Refrain from school or work while fever and symptoms  X-ray negative for pneumonia

## 2022-07-20 DIAGNOSIS — J18.9 PNEUMONIA: Primary | ICD-10-CM

## 2022-07-20 RX ORDER — AZITHROMYCIN 250 MG/1
TABLET, FILM COATED ORAL
Qty: 6 TABLET | Refills: 0 | Status: SHIPPED | OUTPATIENT
Start: 2022-07-20 | End: 2022-07-24

## 2022-07-21 LAB
ATRIAL RATE: 105 BPM
P AXIS: 58 DEGREES
PR INTERVAL: 156 MS
QRS AXIS: 31 DEGREES
QRSD INTERVAL: 106 MS
QT INTERVAL: 364 MS
QTC INTERVAL: 481 MS
T WAVE AXIS: 56 DEGREES
VENTRICULAR RATE: 105 BPM

## 2022-07-21 PROCEDURE — 93010 ELECTROCARDIOGRAM REPORT: CPT | Performed by: INTERNAL MEDICINE

## 2022-07-26 ENCOUNTER — OFFICE VISIT (OUTPATIENT)
Dept: FAMILY MEDICINE CLINIC | Facility: CLINIC | Age: 37
End: 2022-07-26
Payer: COMMERCIAL

## 2022-07-26 VITALS
TEMPERATURE: 97.9 F | OXYGEN SATURATION: 96 % | HEART RATE: 106 BPM | DIASTOLIC BLOOD PRESSURE: 84 MMHG | RESPIRATION RATE: 16 BRPM | HEIGHT: 65 IN | WEIGHT: 290 LBS | SYSTOLIC BLOOD PRESSURE: 160 MMHG | BODY MASS INDEX: 48.32 KG/M2

## 2022-07-26 DIAGNOSIS — Z12.4 SCREENING FOR CERVICAL CANCER: ICD-10-CM

## 2022-07-26 DIAGNOSIS — E66.01 MORBID OBESITY WITH BMI OF 45.0-49.9, ADULT (HCC): ICD-10-CM

## 2022-07-26 DIAGNOSIS — Z12.4 CERVICAL CANCER SCREENING: ICD-10-CM

## 2022-07-26 DIAGNOSIS — Z00.00 ENCOUNTER FOR PHYSICAL EXAMINATION: ICD-10-CM

## 2022-07-26 DIAGNOSIS — J45.30 MILD PERSISTENT ASTHMA WITHOUT COMPLICATION: ICD-10-CM

## 2022-07-26 DIAGNOSIS — Z87.891 SMOKING HISTORY: ICD-10-CM

## 2022-07-26 DIAGNOSIS — Z11.59 NEED FOR HEPATITIS C SCREENING TEST: Primary | ICD-10-CM

## 2022-07-26 DIAGNOSIS — G43.009 MIGRAINE WITHOUT AURA AND WITHOUT STATUS MIGRAINOSUS, NOT INTRACTABLE: ICD-10-CM

## 2022-07-26 PROCEDURE — 99213 OFFICE O/P EST LOW 20 MIN: CPT | Performed by: FAMILY MEDICINE

## 2022-07-26 PROCEDURE — T1015 CLINIC SERVICE: HCPCS | Performed by: FAMILY MEDICINE

## 2022-07-26 RX ORDER — SUMATRIPTAN 50 MG/1
50 TABLET, FILM COATED ORAL ONCE AS NEEDED
Qty: 9 TABLET | Refills: 0 | Status: SHIPPED | OUTPATIENT
Start: 2022-07-26 | End: 2022-10-30 | Stop reason: ALTCHOICE

## 2022-07-26 RX ORDER — VARENICLINE TARTRATE 1 MG/1
1 TABLET, FILM COATED ORAL 2 TIMES DAILY
Qty: 30 TABLET | Refills: 1 | Status: SHIPPED | OUTPATIENT
Start: 2022-07-26 | End: 2022-10-30 | Stop reason: ALTCHOICE

## 2022-07-26 RX ORDER — ALBUTEROL SULFATE 90 UG/1
2 AEROSOL, METERED RESPIRATORY (INHALATION) EVERY 4 HOURS PRN
Qty: 18 G | Refills: 5 | Status: SHIPPED | OUTPATIENT
Start: 2022-07-26

## 2022-07-26 RX ORDER — FLUTICASONE PROPIONATE AND SALMETEROL XINAFOATE 45; 21 UG/1; UG/1
2 AEROSOL, METERED RESPIRATORY (INHALATION) 2 TIMES DAILY
Qty: 12 G | Refills: 1 | Status: SHIPPED | OUTPATIENT
Start: 2022-07-26

## 2022-07-26 RX ORDER — ALBUTEROL SULFATE 2.5 MG/3ML
2.5 SOLUTION RESPIRATORY (INHALATION) EVERY 6 HOURS PRN
Qty: 3 ML | Refills: 1 | Status: SHIPPED | OUTPATIENT
Start: 2022-07-26

## 2022-07-26 NOTE — PROGRESS NOTES
Assessment/Plan:     Problem List Items Addressed This Visit        Respiratory    Asthma, mild persistent    Relevant Medications    albuterol (2 5 mg/3 mL) 0 083 % nebulizer solution    albuterol (Ventolin HFA) 90 mcg/act inhaler    fluticasone-salmeterol (Advair HFA) 45-21 MCG/ACT inhaler       Cardiovascular and Mediastinum    Migraine without aura and without status migrainosus, not intractable    Relevant Medications    SUMAtriptan (IMITREX) 50 mg tablet      Other Visit Diagnoses     Need for hepatitis C screening test    -  Primary    Relevant Orders    Hepatitis C antibody    Screening for cervical cancer        Relevant Orders    Ambulatory Referral to Gynecology    Cervical cancer screening        Relevant Orders    Ambulatory Referral to Obstetrics / Gynecology    Encounter for physical examination        Relevant Orders    Lipid Panel with Direct LDL reflex    TSH, 3rd generation with Free T4 reflex    Vitamin D 25 hydroxy    Morbid obesity with BMI of 45 0-49 9, [facility] ([facility]        Relevant Orders    Ambulatory Referral to Weight Management    Smoking history        Relevant Medications    varenicline (CHANTIX) 1 mg tablet            Patient will be given some labs to complete including lipid panel, TSH, hep C and vitamin-D  Patient recently had CBC and CMP done  Discussed with patient about taking high potassium diet due to hyperkalemia on lab results  Additionally patient will be given referral for weight management  Patient blood pressure was slightly elevated at 160/84  Rechecked and was 140/90  Will continue to monitor blood pressure curve  Also added a script for Chantix to be taken due to significant smoking history  Patient will be scheduling appointment for Pap smear in the clinic  She also begin HPV done  Subjective:      Patient ID: Dilma Ledezma is a 40 y o  female with past medical history of asthma, migraines, morbid obesity presents to office for follow-up visit   Patient has no acute complains today  Patient does have a history of migraines but indicates that she in much better in that regard  Patient does smoke about 1/2 pack a day  Patient denies recreational drug use and alcohol use  Patient did admit to not being compliant with medications as her main focus was her brother's health  Otherwise patient's review of systems negative patient denies fevers, chills, night sweats, chest pain or shortness of breath  Patient's only admission is chronic migraines  Patient had stopped taking her Imitrex over the past couple years  Otherwise hemodynamically stable, clinically well-appearing, in no acute distress  The following portions of the patient's history were reviewed and updated as appropriate: allergies, current medications, past family history, past medical history, past social history, past surgical history and problem list     Review of Systems   Constitutional: Negative for chills and fever  HENT: Negative for ear pain and sore throat  Eyes: Negative for pain and visual disturbance  Respiratory: Negative for cough and shortness of breath  Cardiovascular: Negative for chest pain and palpitations  Gastrointestinal: Negative for abdominal pain and vomiting  Genitourinary: Negative for dysuria and hematuria  Musculoskeletal: Negative for arthralgias and back pain  Skin: Negative for color change and rash  Neurological: Negative for seizures and syncope  All other systems reviewed and are negative  Objective:    /84   Pulse (!) 106   Temp 97 9 °F (36 6 °C)   Resp 16   Ht 5' 5" (1 651 m)   Wt 132 kg (290 lb)   SpO2 96%   BMI 48 26 kg/m²        Physical Exam  Constitutional:       Appearance: Normal appearance  She is obese  HENT:      Head: Normocephalic and atraumatic  Right Ear: Tympanic membrane normal       Left Ear: Tympanic membrane normal    Cardiovascular:      Rate and Rhythm: Normal rate and regular rhythm  Pulses: Normal pulses  Heart sounds: Normal heart sounds  Pulmonary:      Effort: Pulmonary effort is normal       Breath sounds: Normal breath sounds  Abdominal:      General: Abdomen is flat  Bowel sounds are normal  There is no distension  Palpations: Abdomen is soft  There is no mass  Tenderness: There is no abdominal tenderness  Hernia: No hernia is present  Musculoskeletal:         General: No swelling or tenderness  Normal range of motion  Cervical back: Normal range of motion  Skin:     General: Skin is warm  Capillary Refill: Capillary refill takes less than 2 seconds  Neurological:      General: No focal deficit present  Mental Status: She is alert and oriented to person, place, and time  Sensory: No sensory deficit  Motor: No weakness        Coordination: Coordination normal    Psychiatric:         Mood and Affect: Mood normal          Behavior: Behavior normal

## 2022-09-10 ENCOUNTER — APPOINTMENT (OUTPATIENT)
Dept: RADIOLOGY | Facility: HOSPITAL | Age: 37
End: 2022-09-10
Payer: COMMERCIAL

## 2022-09-10 ENCOUNTER — HOSPITAL ENCOUNTER (EMERGENCY)
Facility: HOSPITAL | Age: 37
Discharge: HOME | End: 2022-09-10
Attending: EMERGENCY MEDICINE | Admitting: EMERGENCY MEDICINE
Payer: COMMERCIAL

## 2022-09-10 VITALS
OXYGEN SATURATION: 97 % | HEART RATE: 103 BPM | DIASTOLIC BLOOD PRESSURE: 75 MMHG | RESPIRATION RATE: 18 BRPM | WEIGHT: 280 LBS | BODY MASS INDEX: 46.59 KG/M2 | SYSTOLIC BLOOD PRESSURE: 135 MMHG | TEMPERATURE: 102.1 F

## 2022-09-10 DIAGNOSIS — B34.9 VIRAL SYNDROME: Primary | ICD-10-CM

## 2022-09-10 LAB
FLUAV RNA RESP QL NAA+PROBE: NEGATIVE
FLUBV RNA RESP QL NAA+PROBE: NEGATIVE
RSV RNA RESP QL NAA+PROBE: NEGATIVE
SARS-COV-2 RNA RESP QL NAA+PROBE: POSITIVE

## 2022-09-10 PROCEDURE — 0241U HB NFCT DS VIR RESP RNA 4 TRGT: CPT | Performed by: EMERGENCY MEDICINE

## 2022-09-10 PROCEDURE — 99284 EMERGENCY DEPT VISIT MOD MDM: CPT | Performed by: EMERGENCY MEDICINE

## 2022-09-10 PROCEDURE — 71045 X-RAY EXAM CHEST 1 VIEW: CPT

## 2022-09-10 PROCEDURE — 96372 THER/PROPH/DIAG INJ SC/IM: CPT

## 2022-09-10 PROCEDURE — 99283 EMERGENCY DEPT VISIT LOW MDM: CPT

## 2022-09-10 RX ORDER — ONDANSETRON 4 MG/1
4 TABLET, ORALLY DISINTEGRATING ORAL EVERY 6 HOURS PRN
Qty: 20 TABLET | Refills: 0 | Status: SHIPPED | OUTPATIENT
Start: 2022-09-10 | End: 2022-10-30 | Stop reason: ALTCHOICE

## 2022-09-10 RX ORDER — DIPHENHYDRAMINE HCL 25 MG
25 TABLET ORAL ONCE
Status: COMPLETED | OUTPATIENT
Start: 2022-09-10 | End: 2022-09-10

## 2022-09-10 RX ORDER — DEXAMETHASONE 4 MG/1
8 TABLET ORAL ONCE
Status: COMPLETED | OUTPATIENT
Start: 2022-09-10 | End: 2022-09-10

## 2022-09-10 RX ORDER — METOCLOPRAMIDE 10 MG/1
10 TABLET ORAL ONCE
Status: COMPLETED | OUTPATIENT
Start: 2022-09-10 | End: 2022-09-10

## 2022-09-10 RX ORDER — KETOROLAC TROMETHAMINE 30 MG/ML
15 INJECTION, SOLUTION INTRAMUSCULAR; INTRAVENOUS ONCE
Status: COMPLETED | OUTPATIENT
Start: 2022-09-10 | End: 2022-09-10

## 2022-09-10 RX ADMIN — DIPHENHYDRAMINE HYDROCHLORIDE 25 MG: 25 TABLET ORAL at 13:14

## 2022-09-10 RX ADMIN — KETOROLAC TROMETHAMINE 15 MG: 30 INJECTION, SOLUTION INTRAMUSCULAR at 13:14

## 2022-09-10 RX ADMIN — DEXAMETHASONE 8 MG: 4 TABLET ORAL at 13:14

## 2022-09-10 RX ADMIN — METOCLOPRAMIDE 10 MG: 10 TABLET ORAL at 13:14

## 2022-09-10 NOTE — Clinical Note
Faviola Rapp was seen and treated in our emergency department on 9/10/2022  Diagnosis: Viral illness, fever    Harpreet Covarrubias  may return to work on return date  She may return on this date: 09/14/2022         If you have any questions or concerns, please don't hesitate to call        Rosalind Torres, DO    ______________________________           _______________          _______________  Hospital Representative                              Date                                Time

## 2022-09-10 NOTE — Clinical Note
Annemarie Reynoso was seen and treated in our emergency department on 9/10/2022  Diagnosis: Viral illness, fever    Rebecca Lomax  may return to work on return date  She may return on this date: 09/14/2022         If you have any questions or concerns, please don't hesitate to call        Nando Shirley DO    ______________________________           _______________          _______________  Hospital Representative                              Date                                Time

## 2022-09-10 NOTE — ED PROVIDER NOTES
History  Chief Complaint   Patient presents with    Flu Symptoms     Patient reports onset of flu like symptoms yesterday including nausea fever cough congestion headache  60-year-old female presents for evaluation of cold symptoms  Patient reports yesterday developing a diffuse throbbing headache, fever, bilateral ear fullness, nasal congestion, dry cough, diffuse chest tightness, nausea without vomiting, body aches  Patient reports two coworkers were out recently with [phone] however she is not in much contact with them  She does note that she does come into contact with a large amount of people through her work place  She has history of migraines, states this headache is different and more global, throbbing sensation  Headache started gradually and ramp to where it is  She does have a sense of nausea, she is uncertain if this related to the headache  Patient has had a lack of appetite although able to tolerate small amounts of food  She does note smell and taste differences  She has tried taking over-the-counter pain medications at home without relief, last dose was early this morning  She denies dyspnea, chest pain, abdominal pain, diarrhea, dysuria  Prior to Admission Medications   Prescriptions Last Dose Informant Patient Reported? Taking?    ARIPiprazole (ABILIFY) 10 mg tablet   Yes No   Sig: Take 10 mg by mouth daily   Patient not taking: Reported on 7/26/2022   LORazepam (ATIVAN) 1 mg tablet   Yes No   Sig: Take 1 mg by mouth daily at bedtime   Patient not taking: Reported on 7/26/2022   Nebulizers (NEBULIZER COMPRESSOR) MISC   No No   Sig: Use as directed   Patient not taking: Reported on 7/26/2022   SUMAtriptan (IMITREX) 50 mg tablet   No No   Sig: Take 1 tablet (50 mg total) by mouth once as needed for migraine for up to 1 dose   albuterol (2 5 mg/3 mL) 0 083 % nebulizer solution   No No   Sig: Take 3 mL (2 5 mg total) by nebulization every 6 (six) hours as needed for wheezing or shortness of breath   albuterol (Ventolin HFA) 90 mcg/act inhaler   No No   Sig: Inhale 2 puffs every 4 (four) hours as needed for wheezing or shortness of breath   atorvastatin (LIPITOR) 10 mg tablet   No No   Sig: Take 1 tablet (10 mg total) by mouth daily   Patient not taking: Reported on 7/26/2022   benzonatate (TESSALON PERLES) 100 mg capsule   No No   Sig: Take 1 capsule (100 mg total) by mouth every 8 (eight) hours   Patient not taking: Reported on 7/26/2022   diclofenac (VOLTAREN) 75 mg EC tablet   No No   Sig: Take 1 tablet (75 mg total) by mouth 2 (two) times a day With food   Patient not taking: Reported on 7/26/2022   diclofenac sodium (VOLTAREN) 1 %   No No   Sig: Apply 2 g topically 4 (four) times a day   Patient not taking: Reported on 7/26/2022   famotidine (PEPCID) 20 mg tablet   No No   Sig: Take 1 tablet (20 mg total) by mouth 2 (two) times a day   Patient not taking: Reported on 7/26/2022   fluticasone-salmeterol (Advair HFA) 45-21 MCG/ACT inhaler   No No   Sig: Inhale 2 puffs 2 (two) times a day Rinse mouth after use    magnesium oxide (MAG-OX) 400 mg   No No   Sig: Take 1 tablet (400 mg total) by mouth daily   Patient not taking: Reported on 7/26/2022   metoclopramide (REGLAN) 10 mg tablet   No No   Sig: Take 1 tablet (10 mg total) by mouth every 6 (six) hours as needed (n/v) for up to 30 doses   Patient not taking: Reported on 7/26/2022   montelukast (SINGULAIR) 10 mg tablet   No No   Sig: Take 1 tablet (10 mg total) by mouth daily   Patient not taking: Reported on 7/26/2022   naproxen (NAPROSYN) 500 mg tablet   No No   Sig: Take 1 tablet (500 mg total) by mouth 2 (two) times a day with meals   Patient not taking: Reported on 7/26/2022   naproxen (Naprosyn) 500 mg tablet   No No   Sig: Take 1 tablet (500 mg total) by mouth 2 (two) times a day with meals   Patient not taking: Reported on 7/26/2022   ondansetron (Zofran ODT) 4 mg disintegrating tablet   No No   Sig: Take 1 tablet (4 mg total) by mouth every 6 (six) hours as needed for nausea or vomiting   Patient not taking: Reported on 7/26/2022   topiramate (Topamax) 50 MG tablet   No No   Sig: Take 1 tablet (50 mg total) by mouth 2 (two) times a day   Patient not taking: Reported on 7/26/2022   varenicline (CHANTIX) 1 mg tablet   No No   Sig: Take 1 tablet (1 mg total) by mouth 2 (two) times a day      Facility-Administered Medications: None       Past Medical History:   Diagnosis Date    Anxiety     Asthma     Bipolar 1 disorder (HCC)     Depression     GERD (gastroesophageal reflux disease)     Hypercholesterolemia        Past Surgical History:   Procedure Laterality Date    CHOLECYSTECTOMY      FL INJECTION RIGHT HIP (ARTHROGRAM)  11/23/2020    FL INJECTION RIGHT HIP (NON ARTHROGRAM)  8/10/2020    TONSILLECTOMY      TUBAL LIGATION         Family History   Problem Relation Age of Onset    Mental illness Mother     Depression Mother     Cancer Mother     Hyperlipidemia Mother     Asthma Father     Arthritis Father     Cancer Brother     Psoriasis Daughter      I have reviewed and agree with the history as documented  E-Cigarette/Vaping    E-Cigarette Use Never User      E-Cigarette/Vaping Substances    Nicotine No     THC No     CBD No     Flavoring No     Other No     Unknown No      Social History     Tobacco Use    Smoking status: Current Every Day Smoker     Packs/day: 0 50     Types: Cigarettes    Smokeless tobacco: Never Used   Vaping Use    Vaping Use: Never used   Substance Use Topics    Alcohol use: Never    Drug use: Not Currently       Review of Systems   Constitutional: Positive for appetite change, chills, fatigue and fever  HENT: Positive for congestion and ear pain  Negative for sore throat  Eyes: Negative for photophobia and visual disturbance  Respiratory: Positive for cough and chest tightness  Negative for shortness of breath  Cardiovascular: Negative for chest pain     Gastrointestinal: Positive for nausea  Negative for abdominal pain, constipation, diarrhea and vomiting  Genitourinary: Negative for dysuria  Musculoskeletal: Positive for myalgias  Neurological: Positive for headaches  All other systems reviewed and are negative  Physical Exam  Physical Exam  Vitals reviewed  Constitutional:       General: She is not in acute distress  Appearance: Normal appearance  She is not ill-appearing, toxic-appearing or diaphoretic  HENT:      Head: Normocephalic and atraumatic  Right Ear: Tympanic membrane, ear canal and external ear normal  There is no impacted cerumen  Left Ear: Tympanic membrane, ear canal and external ear normal  There is no impacted cerumen  Eyes:      General: No scleral icterus  Right eye: No discharge  Left eye: No discharge  Extraocular Movements: Extraocular movements intact  Cardiovascular:      Rate and Rhythm: Normal rate and regular rhythm  Pulmonary:      Effort: Pulmonary effort is normal  No respiratory distress  Breath sounds: Normal breath sounds  No stridor  No wheezing, rhonchi or rales  Abdominal:      Palpations: Abdomen is soft  Musculoskeletal:         General: No deformity or signs of injury  Right lower leg: No edema  Left lower leg: No edema  Skin:     General: Skin is warm  Coloration: Skin is not jaundiced or pale  Neurological:      General: No focal deficit present  Mental Status: She is alert  Mental status is at baseline        Gait: Gait normal          Vital Signs  ED Triage Vitals   Temperature Pulse Respirations Blood Pressure SpO2   09/10/22 1246 09/10/22 1246 09/10/22 1246 09/10/22 1246 09/10/22 1246   (!) 97 3 °F (36 3 °C) (!) 113 18 135/75 97 %      Temp Source Heart Rate Source Patient Position - Orthostatic VS BP Location FiO2 (%)   09/10/22 1246 09/10/22 1246 09/10/22 1246 09/10/22 1246 --   Temporal Monitor Sitting Left arm       Pain Score       09/10/22 1314       9           Vitals:    09/10/22 1246 09/10/22 1252   BP: 135/75    Pulse: (!) 113 103   Patient Position - Orthostatic VS: Sitting          Visual Acuity      ED Medications  Medications   ketorolac (TORADOL) injection 15 mg (15 mg Intramuscular Given 9/10/22 1314)   metoclopramide (REGLAN) tablet 10 mg (10 mg Oral Given 9/10/22 1314)   diphenhydrAMINE (BENADRYL) tablet 25 mg (25 mg Oral Given 9/10/22 1314)   dexamethasone (DECADRON) tablet 8 mg (8 mg Oral Given 9/10/22 1314)       Diagnostic Studies  Results Reviewed     Procedure Component Value Units Date/Time    FLU/RSV/COVID - if FLU/RSV clinically relevant [[id number]]  (Abnormal) Collected: 09/10/22 1313    Lab Status: Final result Specimen: Nares from Nose Updated: 09/10/22 1448     SARS-CoV-2 Positive     INFLUENZA A PCR Negative     INFLUENZA B PCR Negative     RSV PCR Negative    Narrative:      FOR PEDIATRIC PATIENTS - copy/paste COVID Guidelines URL to browser: [email or web address]  [employer]    SARS-CoV-2 assay is a Nucleic Acid Amplification assay intended for the  qualitative detection of nucleic acid from SARS-CoV-2 in nasopharyngeal  swabs  Results are for the presumptive identification of SARS-CoV-2 RNA  Positive results are indicative of infection with SARS-CoV-2, the virus  causing COVID-19, but do not rule out bacterial infection or co-infection  with other viruses  Laboratories within the [place] and its  territories are required to report all positive results to the appropriate  public health authorities  Negative results do not preclude SARS-CoV-2  infection and should not be used as the sole basis for treatment or other  patient management decisions  Negative results must be combined with  clinical observations, patient history, and epidemiological information  This test has not been FDA cleared or approved      This test has been authorized by FDA under an Emergency Use Authorization  (EUA)  This test is only authorized for the duration of time the  declaration that circumstances exist justifying the authorization of the  emergency use of an in vitro diagnostic tests for detection of SARS-CoV-2  virus and/or diagnosis of COVID-19 infection under section 564(b)(1) of  the Act, 21 U  S C  019AZE-8(T)(4), unless the authorization is terminated  or revoked sooner  The test has been validated but independent review by FDA  and CLIA is pending  Test performed using [employer] Gene[employer]pert: This RT-PCR assay targets N2,  a region unique to SARS-CoV-2  A conserved region in the E-gene was chosen  for pan-Sarbecovirus detection which includes SARS-CoV-2  XR chest 1 view portable   Final Result by Yuliya Conway MD (09/10 1350)      No acute cardiopulmonary disease  Workstation performed: [id number]                    Procedures  Procedures         ED Course  ED Course as of 09/10/22 2056   Sat Sep 10, 2022   1251 Blood Pressure: 135/75   1252 Temperature(!): 102 1 °F (38 9 °C)   1252 Pulse(!): 113   1252 Respirations: 18   1252 SpO2: 97 %   1318 PACS images not available at this time due to network outage, however inspection on portable XR machine appears without acute cardiopulmonary changes, no focal infiltrate, no effusions, no cardiomegaly, no ptx   1319 XR chest 1 view portable                                             MDM  Number of Diagnoses or Management Options  Viral syndrome  Diagnosis management comments: 69-year-old female presents for evaluation of cold symptoms  On examination patient overall appears well, she is dry cough in the room  Patient's symptoms are concerning for COVID-19 infection, she does note two coworkers who recently were sick with the same  Will obtain chest x-ray to rule out pneumonia, obtain viral panel  Patient is agreeable to oral migraine cocktail, likely discharge after chest x-ray        Disposition  Final diagnoses:   Viral syndrome     Time reflects when diagnosis was documented in both MDM as applicable and the Disposition within this note     Time User Action Codes Description Comment    9/10/2022  1:05 PM Carlos Almanzar Add [B34 9] Viral syndrome       ED Disposition     ED Disposition   Discharge    Condition   Stable    Date/Time   Sat Sep 10, 2022  1:05 PM    Comment   Dilma Ledezma discharge to home/self care  Follow-up Information     Follow up With Specialties Details Why Contact Info Additional [id number] [facility],[address], [id number] [place] [address]  [address] [facility] Emergency Department Emergency Medicine  If symptoms worsen [facility] [phone]  [address] [facility] Emergency Department, [address], [address]          Discharge Medication List as of 9/10/2022  1:10 PM      START taking these medications    Details   !! ondansetron (Zofran ODT) 4 mg disintegrating tablet Take 1 tablet (4 mg total) by mouth every 6 (six) hours as needed for nausea or vomiting, Starting Sat 9/10/2022, Normal       !! - Potential duplicate medications found  Please discuss with provider        CONTINUE these medications which have NOT CHANGED    Details   albuterol (2 5 mg/3 mL) 0 083 % nebulizer solution Take 3 mL (2 5 mg total) by nebulization every 6 (six) hours as needed for wheezing or shortness of breath, Starting Tue 7/26/2022, Normal      albuterol (Ventolin HFA) 90 mcg/act inhaler Inhale 2 puffs every 4 (four) hours as needed for wheezing or shortness of breath, Starting Tue 7/26/2022, Normal      ARIPiprazole (ABILIFY) 10 mg tablet Take 10 mg by mouth daily, Historical Med      atorvastatin (LIPITOR) 10 mg tablet Take 1 tablet (10 mg total) by mouth daily, Starting Wed 9/30/2020, Normal      benzonatate (TESSALON PERLES) 100 mg capsule Take 1 capsule (100 mg total) by mouth every 8 (eight) hours, Starting Fri 3/4/2022, Normal      diclofenac (VOLTAREN) 75 mg EC tablet Take 1 tablet (75 mg total) by mouth 2 (two) times a day With food, Starting Tue 12/8/2020, Normal      diclofenac sodium (VOLTAREN) 1 % Apply 2 g topically 4 (four) times a day, Starting Thu 6/11/2020, Normal      famotidine (PEPCID) 20 mg tablet Take 1 tablet (20 mg total) by mouth 2 (two) times a day, Starting Mon 8/17/2020, Normal      fluticasone-salmeterol (Advair HFA) 45-21 MCG/ACT inhaler Inhale 2 puffs 2 (two) times a day Rinse mouth after use , Starting Tue 7/26/2022, Normal      LORazepam (ATIVAN) 1 mg tablet Take 1 mg by mouth daily at bedtime, Historical Med      magnesium oxide (MAG-OX) 400 mg Take 1 tablet (400 mg total) by mouth daily, Starting Tue 10/27/2020, Normal      metoclopramide (REGLAN) 10 mg tablet Take 1 tablet (10 mg total) by mouth every 6 (six) hours as needed (n/v) for up to 30 doses, Starting Wed 11/11/2020, Normal      montelukast (SINGULAIR) 10 mg tablet Take 1 tablet (10 mg total) by mouth daily, Starting Thu 6/11/2020, Normal      !! naproxen (NAPROSYN) 500 mg tablet Take 1 tablet (500 mg total) by mouth 2 (two) times a day with meals, Starting Thu 3/11/2021, Normal      !! naproxen (Naprosyn) 500 mg tablet Take 1 tablet (500 mg total) by mouth 2 (two) times a day with meals, Starting Tue 7/19/2022, Normal      Nebulizers (NEBULIZER COMPRESSOR) MISC Use as directed, Normal      !! ondansetron (Zofran ODT) 4 mg disintegrating tablet Take 1 tablet (4 mg total) by mouth every 6 (six) hours as needed for nausea or vomiting, Starting Fri 3/4/2022, Normal      SUMAtriptan (IMITREX) 50 mg tablet Take 1 tablet (50 mg total) by mouth once as needed for migraine for up to 1 dose, Starting Tue 7/26/2022, Normal      topiramate (Topamax) 50 MG tablet Take 1 tablet (50 mg total) by mouth 2 (two) times a day, Starting Thu 6/11/2020, Normal      varenicline (CHANTIX) 1 mg tablet Take 1 tablet (1 mg total) by mouth 2 (two) times a day, Starting Tue 7/26/2022, Normal       !! - Potential duplicate medications found  Please discuss with provider  No discharge procedures on file      PDMP Review     None          ED Provider  Electronically Signed by           Miguel Fang DO  09/10/22 2057

## 2022-09-24 ENCOUNTER — APPOINTMENT (EMERGENCY)
Dept: CT IMAGING | Facility: HOSPITAL | Age: 37
End: 2022-09-24
Payer: COMMERCIAL

## 2022-09-24 ENCOUNTER — HOSPITAL ENCOUNTER (EMERGENCY)
Facility: HOSPITAL | Age: 37
Discharge: HOME | End: 2022-09-24
Attending: EMERGENCY MEDICINE | Admitting: EMERGENCY MEDICINE
Payer: COMMERCIAL

## 2022-09-24 VITALS
RESPIRATION RATE: 16 BRPM | OXYGEN SATURATION: 96 % | DIASTOLIC BLOOD PRESSURE: 76 MMHG | TEMPERATURE: 96.9 F | SYSTOLIC BLOOD PRESSURE: 128 MMHG | HEART RATE: 82 BPM

## 2022-09-24 DIAGNOSIS — R10.13 EPIGASTRIC ABDOMINAL PAIN: Primary | ICD-10-CM

## 2022-09-24 LAB
ALBUMIN SERPL BCP-MCNC: 3.4 G/DL (ref 3.5–5)
ALP SERPL-CCNC: 74 U/L (ref 46–116)
ALT SERPL W P-5'-P-CCNC: 52 U/L (ref 12–78)
ANION GAP SERPL CALCULATED.3IONS-SCNC: 9 MMOL/L (ref 4–13)
AST SERPL W P-5'-P-CCNC: 32 U/L (ref 5–45)
ATRIAL RATE: 92 BPM
BACTERIA UR QL AUTO: ABNORMAL /HPF
BASOPHILS # BLD AUTO: 0.05 THOUSANDS/ΜL (ref 0–0.1)
BASOPHILS NFR BLD AUTO: 1 % (ref 0–1)
BILIRUB SERPL-MCNC: 0.57 MG/DL (ref 0.2–1)
BILIRUB UR QL STRIP: ABNORMAL
BUN SERPL-MCNC: 4 MG/DL (ref 5–25)
CALCIUM ALBUM COR SERPL-MCNC: 9.2 MG/DL (ref 8.3–10.1)
CALCIUM SERPL-MCNC: 8.7 MG/DL (ref 8.3–10.1)
CARDIAC TROPONIN I PNL SERPL HS: <2 NG/L (ref 8–18)
CHLORIDE SERPL-SCNC: 105 MMOL/L (ref 96–108)
CLARITY UR: ABNORMAL
CO2 SERPL-SCNC: 25 MMOL/L (ref 21–32)
COLOR UR: YELLOW
CREAT SERPL-MCNC: 0.73 MG/DL (ref 0.6–1.3)
EOSINOPHIL # BLD AUTO: 0.24 THOUSAND/ΜL (ref 0–0.61)
EOSINOPHIL NFR BLD AUTO: 2 % (ref 0–6)
ERYTHROCYTE [DISTWIDTH] IN BLOOD BY AUTOMATED COUNT: 13 % (ref 11.6–15.1)
EXT PREG TEST URINE: NEGATIVE
EXT. CONTROL ED NAV: NORMAL
GFR SERPL CREATININE-BSD FRML MDRD: 106 ML/MIN/1.73SQ M
GLUCOSE SERPL-MCNC: 138 MG/DL (ref 65–140)
GLUCOSE UR STRIP-MCNC: NEGATIVE MG/DL
HCT VFR BLD AUTO: 44.8 % (ref 34.8–46.1)
HGB BLD-MCNC: 15.3 G/DL (ref 11.5–15.4)
HGB UR QL STRIP.AUTO: NEGATIVE
IMM GRANULOCYTES # BLD AUTO: 0.05 THOUSAND/UL (ref 0–0.2)
IMM GRANULOCYTES NFR BLD AUTO: 1 % (ref 0–2)
KETONES UR STRIP-MCNC: ABNORMAL MG/DL
LACTATE SERPL-SCNC: 1.5 MMOL/L (ref 0.5–2)
LEUKOCYTE ESTERASE UR QL STRIP: NEGATIVE
LIPASE SERPL-CCNC: 118 U/L (ref 73–393)
LYMPHOCYTES # BLD AUTO: 2.82 THOUSANDS/ΜL (ref 0.6–4.47)
LYMPHOCYTES NFR BLD AUTO: 27 % (ref 14–44)
MAGNESIUM SERPL-MCNC: 1.7 MG/DL (ref 1.6–2.6)
MCH RBC QN AUTO: 28.9 PG (ref 26.8–34.3)
MCHC RBC AUTO-ENTMCNC: 34.2 G/DL (ref 31.4–37.4)
MCV RBC AUTO: 85 FL (ref 82–98)
MONOCYTES # BLD AUTO: 0.41 THOUSAND/ΜL (ref 0.17–1.22)
MONOCYTES NFR BLD AUTO: 4 % (ref 4–12)
NEUTROPHILS # BLD AUTO: 6.83 THOUSANDS/ΜL (ref 1.85–7.62)
NEUTS SEG NFR BLD AUTO: 65 % (ref 43–75)
NITRITE UR QL STRIP: NEGATIVE
NON-SQ EPI CELLS URNS QL MICRO: ABNORMAL /HPF
NRBC BLD AUTO-RTO: 0 /100 WBCS
P AXIS: 72 DEGREES
PH UR STRIP.AUTO: 6 [PH]
PLATELET # BLD AUTO: 341 THOUSANDS/UL (ref 149–390)
PMV BLD AUTO: 9.9 FL (ref 8.9–12.7)
POTASSIUM SERPL-SCNC: 3.1 MMOL/L (ref 3.5–5.3)
PR INTERVAL: 160 MS
PROT SERPL-MCNC: 7.4 G/DL (ref 6.4–8.4)
PROT UR STRIP-MCNC: ABNORMAL MG/DL
QRS AXIS: 68 DEGREES
QRSD INTERVAL: 102 MS
QT INTERVAL: 402 MS
QTC INTERVAL: 497 MS
RBC # BLD AUTO: 5.29 MILLION/UL (ref 3.81–5.12)
RBC #/AREA URNS AUTO: ABNORMAL /HPF
SODIUM SERPL-SCNC: 139 MMOL/L (ref 135–147)
SP GR UR STRIP.AUTO: 1.02 (ref 1–1.03)
T WAVE AXIS: 82 DEGREES
UROBILINOGEN UR QL STRIP.AUTO: 0.2 E.U./DL
VENTRICULAR RATE: 92 BPM
WBC # BLD AUTO: 10.4 THOUSAND/UL (ref 4.31–10.16)
WBC #/AREA URNS AUTO: ABNORMAL /HPF

## 2022-09-24 PROCEDURE — 96366 THER/PROPH/DIAG IV INF ADDON: CPT

## 2022-09-24 PROCEDURE — 85025 COMPLETE CBC W/AUTO DIFF WBC: CPT | Performed by: EMERGENCY MEDICINE

## 2022-09-24 PROCEDURE — 83735 ASSAY OF MAGNESIUM: CPT | Performed by: EMERGENCY MEDICINE

## 2022-09-24 PROCEDURE — 83690 ASSAY OF LIPASE: CPT | Performed by: EMERGENCY MEDICINE

## 2022-09-24 PROCEDURE — 93005 ELECTROCARDIOGRAM TRACING: CPT

## 2022-09-24 PROCEDURE — 81025 URINE PREGNANCY TEST: CPT | Performed by: EMERGENCY MEDICINE

## 2022-09-24 PROCEDURE — 80053 COMPREHEN METABOLIC PANEL: CPT | Performed by: EMERGENCY MEDICINE

## 2022-09-24 PROCEDURE — C9113 INJ PANTOPRAZOLE SODIUM, VIA: HCPCS | Performed by: EMERGENCY MEDICINE

## 2022-09-24 PROCEDURE — 36415 COLL VENOUS BLD VENIPUNCTURE: CPT | Performed by: EMERGENCY MEDICINE

## 2022-09-24 PROCEDURE — 99284 EMERGENCY DEPT VISIT MOD MDM: CPT

## 2022-09-24 PROCEDURE — 74177 CT ABD & PELVIS W/CONTRAST: CPT

## 2022-09-24 PROCEDURE — 96375 TX/PRO/DX INJ NEW DRUG ADDON: CPT

## 2022-09-24 PROCEDURE — 81001 URINALYSIS AUTO W/SCOPE: CPT | Performed by: EMERGENCY MEDICINE

## 2022-09-24 PROCEDURE — 84484 ASSAY OF TROPONIN QUANT: CPT | Performed by: EMERGENCY MEDICINE

## 2022-09-24 PROCEDURE — 83605 ASSAY OF LACTIC ACID: CPT | Performed by: EMERGENCY MEDICINE

## 2022-09-24 PROCEDURE — G1004 CDSM NDSC: HCPCS

## 2022-09-24 PROCEDURE — 96365 THER/PROPH/DIAG IV INF INIT: CPT

## 2022-09-24 PROCEDURE — 99284 EMERGENCY DEPT VISIT MOD MDM: CPT | Performed by: EMERGENCY MEDICINE

## 2022-09-24 RX ORDER — ONDANSETRON 2 MG/ML
4 INJECTION INTRAMUSCULAR; INTRAVENOUS ONCE
Status: COMPLETED | OUTPATIENT
Start: 2022-09-24 | End: 2022-09-24

## 2022-09-24 RX ORDER — MORPHINE SULFATE 4 MG/ML
4 INJECTION, SOLUTION INTRAMUSCULAR; INTRAVENOUS ONCE
Status: COMPLETED | OUTPATIENT
Start: 2022-09-24 | End: 2022-09-24

## 2022-09-24 RX ORDER — ONDANSETRON 4 MG/1
4 TABLET, ORALLY DISINTEGRATING ORAL EVERY 6 HOURS PRN
Qty: 20 TABLET | Refills: 0 | Status: SHIPPED | OUTPATIENT
Start: 2022-09-24 | End: 2022-10-30 | Stop reason: ALTCHOICE

## 2022-09-24 RX ORDER — METOCLOPRAMIDE HYDROCHLORIDE 5 MG/ML
10 INJECTION INTRAMUSCULAR; INTRAVENOUS ONCE
Status: COMPLETED | OUTPATIENT
Start: 2022-09-24 | End: 2022-09-24

## 2022-09-24 RX ORDER — PANTOPRAZOLE SODIUM 20 MG/1
20 TABLET, DELAYED RELEASE ORAL DAILY
Qty: 20 TABLET | Refills: 0 | Status: SHIPPED | OUTPATIENT
Start: 2022-09-24 | End: 2022-10-30 | Stop reason: ALTCHOICE

## 2022-09-24 RX ORDER — PANTOPRAZOLE SODIUM 40 MG/10ML
40 INJECTION, POWDER, LYOPHILIZED, FOR SOLUTION INTRAVENOUS ONCE
Status: COMPLETED | OUTPATIENT
Start: 2022-09-24 | End: 2022-09-24

## 2022-09-24 RX ORDER — SODIUM CHLORIDE, SODIUM GLUCONATE, SODIUM ACETATE, POTASSIUM CHLORIDE, MAGNESIUM CHLORIDE, SODIUM PHOSPHATE, DIBASIC, AND POTASSIUM PHOSPHATE .53; .5; .37; .037; .03; .012; .00082 G/100ML; G/100ML; G/100ML; G/100ML; G/100ML; G/100ML; G/100ML
1000 INJECTION, SOLUTION INTRAVENOUS ONCE
Status: COMPLETED | OUTPATIENT
Start: 2022-09-24 | End: 2022-09-24

## 2022-09-24 RX ORDER — DICYCLOMINE HCL 20 MG
20 TABLET ORAL 2 TIMES DAILY PRN
Qty: 20 TABLET | Refills: 0 | Status: SHIPPED | OUTPATIENT
Start: 2022-09-24 | End: 2022-10-30 | Stop reason: ALTCHOICE

## 2022-09-24 RX ORDER — MAGNESIUM HYDROXIDE/ALUMINUM HYDROXICE/SIMETHICONE 120; 1200; 1200 MG/30ML; MG/30ML; MG/30ML
30 SUSPENSION ORAL ONCE
Status: COMPLETED | OUTPATIENT
Start: 2022-09-24 | End: 2022-09-24

## 2022-09-24 RX ADMIN — POTASSIUM CHLORIDE 50 MEQ: 1500 TABLET, EXTENDED RELEASE ORAL at 12:48

## 2022-09-24 RX ADMIN — IOHEXOL 100 ML: 350 INJECTION, SOLUTION INTRAVENOUS at 13:06

## 2022-09-24 RX ADMIN — ALUMINA, MAGNESIA, AND SIMETHICONE ORAL SUSPENSION REGULAR STRENGTH 30 ML: 1200; 1200; 120 SUSPENSION ORAL at 15:27

## 2022-09-24 RX ADMIN — PANTOPRAZOLE SODIUM 40 MG: 40 INJECTION, POWDER, FOR SOLUTION INTRAVENOUS at 12:44

## 2022-09-24 RX ADMIN — SODIUM CHLORIDE, SODIUM GLUCONATE, SODIUM ACETATE, POTASSIUM CHLORIDE, MAGNESIUM CHLORIDE, SODIUM PHOSPHATE, DIBASIC, AND POTASSIUM PHOSPHATE 1000 ML: .53; .5; .37; .037; .03; .012; .00082 INJECTION, SOLUTION INTRAVENOUS at 12:49

## 2022-09-24 RX ADMIN — METOCLOPRAMIDE HYDROCHLORIDE 10 MG: 5 INJECTION INTRAMUSCULAR; INTRAVENOUS at 14:18

## 2022-09-24 RX ADMIN — ONDANSETRON 4 MG: 2 INJECTION INTRAMUSCULAR; INTRAVENOUS at 12:42

## 2022-09-24 RX ADMIN — MORPHINE SULFATE 4 MG: 4 INJECTION, SOLUTION INTRAMUSCULAR; INTRAVENOUS at 12:46

## 2022-09-24 NOTE — DISCHARGE INSTRUCTIONS
You have been prescribed anti nausea medicine and anti spasm medication that should help control your symptoms over the next several days  You should be rechecked by your regular doctor at the end of the coming week  If you develop fever, blood in vomit or stool, passing out, or painful swelling in her stomach, please go to the ER right away

## 2022-09-24 NOTE — Clinical Note
Guzman Phillips was seen and treated in our emergency department on 9/24/2022  Diagnosis:     Jewel Syed  may return to work on return date  She may return on this date: 09/25/2022    Ms Maryann Murcia was seen in the [facility] ER on 24 Sept 2022  She was unable to attend work on 24 Sept but can return on 25 Sept  Thank you  If you have any questions or concerns, please don't hesitate to call        Adolfo Mcdonough DO    ______________________________           _______________          _______________  Hospital Representative                              Date                                Time

## 2022-09-24 NOTE — Clinical Note
Mert Blanco was seen and treated in our emergency department on 9/24/2022  Diagnosis:     Chrystal Frankel  may return to work on return date  She may return on this date: 09/25/2022    Ms Peterson Salazar was seen in the  [facility] ER on 24 Sept 2022  She was unable to attend work on 24 Sept but can return on 25 Sept  Thank you  If you have any questions or concerns, please don't hesitate to call        Niko Lilly DO    ______________________________           _______________          _______________  Hospital Representative                              Date                                Time

## 2022-09-24 NOTE — ED PROVIDER NOTES
History  Chief Complaint   Patient presents with    Abdominal Pain     Generalized abd pain x1 5 weeks  Denies n/v/d       History provided by:  Medical records and patient  Abdominal Pain  Pain location:  Epigastric, LUQ and RUQ  Pain quality: sharp and stabbing    Pain radiates to:  Does not radiate  Pain severity:  Severe  Onset quality:  Sudden  Duration:  7 days  Timing:  Constant  Progression:  Worsening  Chronicity:  New (Does not have history of recurrent or similar sx)  Context: previous surgery (cholecystectomy; tubal ligation)    Context: not eating (No worsening or improvement of sx when eating), not recent illness, not recent travel and not sick contacts    Context comment:  No abx use in past 30d  Relieved by:  Nothing  Worsened by: Movement, palpation and position changes  Ineffective treatments:  OTC medications (Has taken OTC analgesics without improvement in sx)  Associated symptoms: nausea    Associated symptoms: no chest pain, no chills, no cough, no diarrhea, no dysuria, no fever, no shortness of breath and no vomiting    Risk factors: multiple surgeries and obesity    Risk factors: no aspirin use and no recent hospitalization      30-year-old woman presenting with approximately one week history of bilateral upper quadrant and epigastric sharp/burning pain  Pain has been essentially constant  It is quite severe, unlike anything she has ever had previously  It has been refractory to over-the-counter medications  She came to the emergency department primarily because the pain is worsening and she is concerned that there may be a dangerous underlying etiology for it  She was diagnosed with COVID-19 on 10 September 2022  She had mild URI symptoms with that illness  She did not have any GI symptoms at the time  She had completely recovered from those symptoms prior to the onset of her current illness      DDx includes but is not limited to:  Pancreatitis, hepatitis, gastritis, peptic ulcer disease, colitis, enteritis  CBC/CMP/lipase/UA/UPT/CT abdomen pelvis with IV contrast   Symptomatic treatment while workup ongoing  Disposition pending  Prior to Admission Medications   Prescriptions Last Dose Informant Patient Reported? Taking?    ARIPiprazole (ABILIFY) 10 mg tablet   Yes No   Sig: Take 10 mg by mouth daily   Patient not taking: Reported on 7/26/2022   LORazepam (ATIVAN) 1 mg tablet   Yes No   Sig: Take 1 mg by mouth daily at bedtime   Patient not taking: Reported on 7/26/2022   Nebulizers (NEBULIZER COMPRESSOR) MISC   No No   Sig: Use as directed   Patient not taking: Reported on 7/26/2022   SUMAtriptan (IMITREX) 50 mg tablet   No No   Sig: Take 1 tablet (50 mg total) by mouth once as needed for migraine for up to 1 dose   albuterol (2 5 mg/3 mL) 0 083 % nebulizer solution   No No   Sig: Take 3 mL (2 5 mg total) by nebulization every 6 (six) hours as needed for wheezing or shortness of breath   albuterol (Ventolin HFA) 90 mcg/act inhaler   No No   Sig: Inhale 2 puffs every 4 (four) hours as needed for wheezing or shortness of breath   atorvastatin (LIPITOR) 10 mg tablet   No No   Sig: Take 1 tablet (10 mg total) by mouth daily   Patient not taking: Reported on 7/26/2022   benzonatate (TESSALON PERLES) 100 mg capsule   No No   Sig: Take 1 capsule (100 mg total) by mouth every 8 (eight) hours   Patient not taking: Reported on 7/26/2022   diclofenac (VOLTAREN) 75 mg EC tablet   No No   Sig: Take 1 tablet (75 mg total) by mouth 2 (two) times a day With food   Patient not taking: Reported on 7/26/2022   diclofenac sodium (VOLTAREN) 1 %   No No   Sig: Apply 2 g topically 4 (four) times a day   Patient not taking: Reported on 7/26/2022   famotidine (PEPCID) 20 mg tablet   No No   Sig: Take 1 tablet (20 mg total) by mouth 2 (two) times a day   Patient not taking: Reported on 7/26/2022   fluticasone-salmeterol (Advair HFA) 45-21 MCG/ACT inhaler   No No   Sig: Inhale 2 puffs 2 (two) times a day Rinse mouth after use    magnesium oxide (MAG-OX) 400 mg   No No   Sig: Take 1 tablet (400 mg total) by mouth daily   Patient not taking: Reported on 7/26/2022   metoclopramide (REGLAN) 10 mg tablet   No No   Sig: Take 1 tablet (10 mg total) by mouth every 6 (six) hours as needed (n/v) for up to 30 doses   Patient not taking: Reported on 7/26/2022   montelukast (SINGULAIR) 10 mg tablet   No No   Sig: Take 1 tablet (10 mg total) by mouth daily   Patient not taking: Reported on 7/26/2022   naproxen (NAPROSYN) 500 mg tablet   No No   Sig: Take 1 tablet (500 mg total) by mouth 2 (two) times a day with meals   Patient not taking: Reported on 7/26/2022   naproxen (Naprosyn) 500 mg tablet   No No   Sig: Take 1 tablet (500 mg total) by mouth 2 (two) times a day with meals   Patient not taking: Reported on 7/26/2022   ondansetron (Zofran ODT) 4 mg disintegrating tablet   No No   Sig: Take 1 tablet (4 mg total) by mouth every 6 (six) hours as needed for nausea or vomiting   Patient not taking: Reported on 7/26/2022   ondansetron (Zofran ODT) 4 mg disintegrating tablet   No No   Sig: Take 1 tablet (4 mg total) by mouth every 6 (six) hours as needed for nausea or vomiting   topiramate (Topamax) 50 MG tablet   No No   Sig: Take 1 tablet (50 mg total) by mouth 2 (two) times a day   Patient not taking: Reported on 7/26/2022   varenicline (CHANTIX) 1 mg tablet   No No   Sig: Take 1 tablet (1 mg total) by mouth 2 (two) times a day      Facility-Administered Medications: None       Past Medical History:   Diagnosis Date    Anxiety     Asthma     Bipolar 1 disorder (HCC)     Depression     GERD (gastroesophageal reflux disease)     Hypercholesterolemia        Past Surgical History:   Procedure Laterality Date    CHOLECYSTECTOMY      FL INJECTION RIGHT HIP (ARTHROGRAM)  11/23/2020    FL INJECTION RIGHT HIP (NON ARTHROGRAM)  8/10/2020    TONSILLECTOMY      TUBAL LIGATION         Family History   Problem Relation Age of Onset    Mental illness Mother     Depression Mother     Cancer Mother     Hyperlipidemia Mother     Asthma Father     Arthritis Father     Cancer Brother     Psoriasis Daughter      I have reviewed and agree with the history as documented  E-Cigarette/Vaping    E-Cigarette Use Never User      E-Cigarette/Vaping Substances    Nicotine No     THC No     CBD No     Flavoring No     Other No     Unknown No      Social History     Tobacco Use    Smoking status: Current Every Day Smoker     Packs/day: 0 50     Types: Cigarettes    Smokeless tobacco: Never Used   Vaping Use    Vaping Use: Never used   Substance Use Topics    Alcohol use: Never    Drug use: Not Currently       Review of Systems   Constitutional: Negative for chills and fever  Respiratory: Negative for cough and shortness of breath  Cardiovascular: Negative for chest pain and palpitations  Gastrointestinal: Positive for abdominal pain and nausea  Negative for abdominal distention, blood in stool, diarrhea and vomiting  Genitourinary: Negative for difficulty urinating, dysuria and flank pain  Skin: Negative for color change, pallor, rash and wound  All other systems reviewed and are negative  Physical Exam  Physical Exam  Vitals and nursing note reviewed  Constitutional:       General: She is awake  She is in acute distress (moderate painful distress)  Appearance: Normal appearance  She is well-developed  HENT:      Head: Normocephalic and atraumatic  Right Ear: Hearing and external ear normal       Left Ear: Hearing and external ear normal    Neck:      Trachea: Trachea and phonation normal    Cardiovascular:      Rate and Rhythm: Normal rate and regular rhythm  Pulses:           Radial pulses are 2+ on the right side and 2+ on the left side  Dorsalis pedis pulses are 2+ on the right side and 2+ on the left side  Posterior tibial pulses are 2+ on the right side and 2+ on the left side  Heart sounds: Normal heart sounds, S1 normal and S2 normal  No murmur heard  No friction rub  No gallop  Pulmonary:      Effort: Pulmonary effort is normal  No respiratory distress  Breath sounds: Normal breath sounds  No stridor  No decreased breath sounds, wheezing, rhonchi or rales  Abdominal:      General: There is no distension  Palpations: There is no mass  Tenderness: There is abdominal tenderness in the right upper quadrant and epigastric area  There is no guarding or rebound  Skin:     General: Skin is warm and dry  Neurological:      Mental Status: She is alert and oriented to person, place, and time  GCS: GCS eye subscore is 4  GCS verbal subscore is 5  GCS motor subscore is 6  Cranial Nerves: No cranial nerve deficit  Sensory: No sensory deficit  Motor: No abnormal muscle tone  Comments: PERRLA; EOMI  Sensation intact to light touch over face in V1-V3 distribution bilaterally  Facial expressions symmetric  Tongue/uvula midline  Shoulder shrug equal bilaterally  Strength 5/5 in UE/LE bilaterally  Sensation intact to light touch in UE/LE bilaterally           Vital Signs  ED Triage Vitals [09/24/22 1132]   Temperature Pulse Respirations Blood Pressure SpO2   (!) 96 9 °F (36 1 °C) (!) 108 18 (!) 170/102 96 %      Temp Source Heart Rate Source Patient Position - Orthostatic VS BP Location FiO2 (%)   Temporal Monitor Sitting Right arm --      Pain Score       8           Vitals:    09/24/22 1230 09/24/22 1415 09/24/22 1430 09/24/22 1500   BP: 135/91 129/77 127/75 128/76   Pulse: 94 82 78 82   Patient Position - Orthostatic VS: Lying Lying Lying Lying         Visual Acuity      ED Medications  Medications   ondansetron (ZOFRAN) injection 4 mg (4 mg Intravenous Given 9/24/22 1242)   pantoprazole (PROTONIX) injection 40 mg (40 mg Intravenous Given 9/24/22 1244)   morphine injection 4 mg (4 mg Intravenous Given 9/24/22 1246)   multi-electrolyte (ISOLYTE-S PH 7 4) bolus 1,000 mL (0 mL Intravenous Stopped 9/24/22 1527)   potassium chloride (K-DUR,KLOR-CON) CR tablet 50 mEq (50 mEq Oral Given 9/24/22 1248)   iohexol (OMNIPAQUE) 350 MG/ML injection (SINGLE-DOSE) 100 mL (100 mL Intravenous Given 9/24/22 1306)   metoclopramide (REGLAN) injection 10 mg (10 mg Intravenous Given 9/24/22 1418)   aluminum-magnesium hydroxide-simethicone (MYLANTA) oral suspension 30 mL (30 mL Oral Given 9/24/22 1527)       Diagnostic Studies  Results Reviewed     Procedure Component Value Units Date/Time    High Sensitivity Troponin I Random [[id number]]  (Abnormal) Collected: 09/24/22 1429    Lab Status: Final result Specimen: Blood from Arm, Right Updated: 09/24/22 1531     HS TnI random <2 ng/L     Lactic acid, plasma [[id number]]  (Normal) Collected: 09/24/22 1242    Lab Status: Final result Specimen: Blood from Arm, Left Updated: 09/24/22 1315     LACTIC ACID 1 5 mmol/L     Narrative:      Result may be elevated if tourniquet was used during collection      Magnesium [[id number]]  (Normal) Collected: 09/24/22 1149    Lab Status: Final result Specimen: Blood Updated: 09/24/22 1250     Magnesium 1 7 mg/dL     Comprehensive metabolic panel [[id number]]  (Abnormal) Collected: 09/24/22 1149    Lab Status: Final result Specimen: Blood from Arm, Left Updated: 09/24/22 1219     Sodium 139 mmol/L      Potassium 3 1 mmol/L      Chloride 105 mmol/L      CO2 25 mmol/L      ANION GAP 9 mmol/L      BUN 4 mg/dL      Creatinine 0 73 mg/dL      Glucose 138 mg/dL      Calcium 8 7 mg/dL      Corrected Calcium 9 2 mg/dL      AST 32 U/L      ALT 52 U/L      Alkaline Phosphatase 74 U/L      Total Protein 7 4 g/dL      Albumin 3 4 g/dL      Total Bilirubin 0 57 mg/dL      eGFR 106 ml/min/1 73sq m     Narrative:      [place] guidelines for Chronic Kidney Disease (CKD):     Stage 1 with normal or high GFR (GFR > 90 mL/min/1 73 square meters)    Stage 2 Mild CKD (GFR = 60-89 mL/min/1 73 square meters)    Stage 3A Moderate CKD (GFR = 45-59 mL/min/1 73 square meters)    Stage 3B Moderate CKD (GFR = 30-44 mL/min/1 73 square meters)    Stage 4 Severe CKD (GFR = 15-29 mL/min/1 73 square meters)    Stage 5 End Stage CKD (GFR <15 mL/min/1 73 square meters)  Note: GFR calculation is accurate only with a steady state creatinine    Lipase [[id number]]  (Normal) Collected: 09/24/22 1149    Lab Status: Final result Specimen: Blood from Arm, Left Updated: 09/24/22 1219     Lipase 118 u/L     Urine Microscopic [[id number]]  (Abnormal) Collected: 09/24/22 1149    Lab Status: Final result Specimen: Urine Updated: 09/24/22 1219     RBC, UA None Seen /hpf      WBC, UA 1-2 /hpf      Epithelial Cells Moderate /hpf      Bacteria, UA Occasional /hpf     UA w Reflex to Microscopic w Reflex to Culture [[id number]]  (Abnormal) Collected: 09/24/22 1149    Lab Status: Final result Specimen: Urine Updated: 09/24/22 1210     Color, UA Yellow     Clarity, UA Cloudy     Specific Gravity, UA 1 025     pH, UA 6 0     Leukocytes, UA Negative     Nitrite, UA Negative     Protein, UA Trace mg/dl      Glucose, UA Negative mg/dl      Ketones, UA Trace mg/dl      Urobilinogen, UA 0 2 E U /dl      Bilirubin, UA Small     Occult Blood, UA Negative    CBC and differential [[id number]]  (Abnormal) Collected: 09/24/22 1149    Lab Status: Final result Specimen: Blood from Arm, Left Updated: 09/24/22 1205     WBC 10 40 Thousand/uL      RBC 5 29 Million/uL      Hemoglobin 15 3 g/dL      Hematocrit 44 8 %      MCV 85 fL      MCH 28 9 pg      MCHC 34 2 g/dL      RDW 13 0 %      MPV 9 9 fL      Platelets 661 Thousands/uL      nRBC 0 /100 WBCs      Neutrophils Relative 65 %      Immat GRANS % 1 %      Lymphocytes Relative 27 %      Monocytes Relative 4 %      Eosinophils Relative 2 %      Basophils Relative 1 %      Neutrophils Absolute 6 83 Thousands/µL      Immature Grans Absolute 0 05 Thousand/uL      Lymphocytes Absolute 2 82 Thousands/µL Monocytes Absolute 0 41 Thousand/µL      Eosinophils Absolute 0 24 Thousand/µL      Basophils Absolute 0 05 Thousands/µL     POCT pregnancy, urine [[id number]]  (Normal) Resulted: 09/24/22 1149    Lab Status: Final result Specimen: Urine Updated: 09/24/22 1149     EXT PREG TEST UR (Ref: Negative) Negative     Control Valid                 CT abdomen pelvis with contrast   Final Result by Harris Santana MD (09/24 1322)      Exam within normal limits  Workstation performed: [id number]                    Procedures  Procedures         ED Course  ED Course as of 09/24/22 1549   Sat Sep 24, 2022   1156 POCT pregnancy, urine  Negative   1210 Mild leukocytosis  Hemoglobin/hematocrit normal  Platelets normal   5168 UA w Reflex to Microscopic w Reflex to Culture(!)  Trace ketones  Small blood  Trace protein  Moderately concentrated   1234 Lipase  Normal   1234 Comprehensive metabolic panel(!)  Hypokalemia; will replete orally  Transaminases normal   1238 Magnesium   1312 CT completed and awaiting interpretation   1318 Lactic acid, plasma   1331 CT abdomen pelvis with contrast    FINDINGS:     ABDOMEN     LOWER CHEST:  No clinically significant abnormality identified in the visualized lower chest      LIVER/BILIARY TREE:  Unremarkable      GALLBLADDER:  Gallbladder is surgically absent      SPLEEN:  Unremarkable      PANCREAS:  Unremarkable      ADRENAL GLANDS:  Unremarkable      KIDNEYS/URETERS:  Unremarkable  No hydronephrosis      STOMACH AND BOWEL:  Unremarkable      APPENDIX:  A normal appendix was visualized      ABDOMINOPELVIC CAVITY:  No ascites  No pneumoperitoneum  No lymphadenopathy      VESSELS:  Unremarkable for patient's age      PELVIS     REPRODUCTIVE ORGANS:  Uterus appears unremarkable  There is no evidence of adnexal mass    Ovaries appear stable size and position compared to prior      URINARY BLADDER:  Unremarkable      ABDOMINAL WALL/INGUINAL REGIONS: Unremarkable      OSSEOUS STRUCTURES:  No acute fracture or destructive osseous lesion      IMPRESSION:     Exam within normal limits  1350 Patient apprised of CT findings  Still has moderate pain at this point, worse when she changes position  She clarified that it has not been exertional nor has it been pleuritic  She has not been short of breath nor diaphoretic  Very low concern for ACS to be sure: will check ECG/troponin to exclude  Will give metoclopramide for sx control  1430 ECG NSR 92 bpm    Qtc 497  No ectopy  Normal axis  No Q waves  No acute ST changes  Mild baseline variability  No significant changes from 19 July 2022   1532 High Sensitivity Troponin I Random(!)  Undetectable; not consistent with ACS       MDM  Number of Diagnoses or Management Options  Epigastric abdominal pain  Diagnosis management comments: No specific findings to account for patient's symptoms  Differential diagnosis remains upper GI pathology such as gastritis, peptic ulcer disease, etc  versus abdominal wall strain/muscle tear  Again, patient has no specific triggers that really could account for either  She does not drink alcohol regularly  Does not use NSAIDs regularly  Did not receive steroid during Covid sx  I did advise her to avoid NSAIDs and alcohol for the immediate future as these would certainly worsen upper GI pathology if present  Short course of antiemetic, PPI, and antispasmodic for symptom control  Recheck by primary physician in the coming week  ED return for GIB/fever/syncope/painful abd distention  All questions were answered to patient's satisfaction prior to discharge  She expressed understanding and agreed to plan        Disposition  Final diagnoses:   Epigastric abdominal pain     Time reflects when diagnosis was documented in both MDM as applicable and the Disposition within this note     Time User Action Codes Description Comment    9/24/2022  2:13 PM Mahin Garrison Add [R10 13] Epigastric abdominal pain       ED Disposition     ED Disposition   Discharge    Condition   Stable    Date/Time   Sat Sep 24, 2022  3:32 PM    Comment   Simon Henderson discharge to home/self care  Follow-up Information    None         Discharge Medication List as of 9/24/2022  3:36 PM      START taking these medications    Details   dicyclomine (BENTYL) 20 mg tablet Take 1 tablet (20 mg total) by mouth 2 (two) times a day as needed (abdominal cramping), Starting Sat 9/24/2022, Normal      !! ondansetron (Zofran ODT) 4 mg disintegrating tablet Take 1 tablet (4 mg total) by mouth every 6 (six) hours as needed for nausea or vomiting, Starting Sat 9/24/2022, Normal      pantoprazole (PROTONIX) 20 mg tablet Take 1 tablet (20 mg total) by mouth daily, Starting Sat 9/24/2022, Normal       !! - Potential duplicate medications found  Please discuss with provider        CONTINUE these medications which have NOT CHANGED    Details   albuterol (2 5 mg/3 mL) 0 083 % nebulizer solution Take 3 mL (2 5 mg total) by nebulization every 6 (six) hours as needed for wheezing or shortness of breath, Starting Tue 7/26/2022, Normal      albuterol (Ventolin HFA) 90 mcg/act inhaler Inhale 2 puffs every 4 (four) hours as needed for wheezing or shortness of breath, Starting Tue 7/26/2022, Normal      ARIPiprazole (ABILIFY) 10 mg tablet Take 10 mg by mouth daily, Historical Med      atorvastatin (LIPITOR) 10 mg tablet Take 1 tablet (10 mg total) by mouth daily, Starting Wed 9/30/2020, Normal      benzonatate (TESSALON PERLES) 100 mg capsule Take 1 capsule (100 mg total) by mouth every 8 (eight) hours, Starting Fri 3/4/2022, Normal      diclofenac (VOLTAREN) 75 mg EC tablet Take 1 tablet (75 mg total) by mouth 2 (two) times a day With food, Starting Tue 12/8/2020, Normal      diclofenac sodium (VOLTAREN) 1 % Apply 2 g topically 4 (four) times a day, Starting u 6/11/2020, Normal      famotidine (PEPCID) 20 mg tablet Take 1 tablet (20 mg total) by mouth 2 (two) times a day, Starting Mon 8/17/2020, Normal      fluticasone-salmeterol (Advair HFA) 45-21 MCG/ACT inhaler Inhale 2 puffs 2 (two) times a day Rinse mouth after use , Starting Tue 7/26/2022, Normal      LORazepam (ATIVAN) 1 mg tablet Take 1 mg by mouth daily at bedtime, Historical Med      magnesium oxide (MAG-OX) 400 mg Take 1 tablet (400 mg total) by mouth daily, Starting Tue 10/27/2020, Normal      metoclopramide (REGLAN) 10 mg tablet Take 1 tablet (10 mg total) by mouth every 6 (six) hours as needed (n/v) for up to 30 doses, Starting Wed 11/11/2020, Normal      montelukast (SINGULAIR) 10 mg tablet Take 1 tablet (10 mg total) by mouth daily, Starting Thu 6/11/2020, Normal      !! naproxen (NAPROSYN) 500 mg tablet Take 1 tablet (500 mg total) by mouth 2 (two) times a day with meals, Starting Thu 3/11/2021, Normal      !! naproxen (Naprosyn) 500 mg tablet Take 1 tablet (500 mg total) by mouth 2 (two) times a day with meals, Starting Tue 7/19/2022, Normal      Nebulizers (NEBULIZER COMPRESSOR) MISC Use as directed, Normal      !! ondansetron (Zofran ODT) 4 mg disintegrating tablet Take 1 tablet (4 mg total) by mouth every 6 (six) hours as needed for nausea or vomiting, Starting Fri 3/4/2022, Normal      !! ondansetron (Zofran ODT) 4 mg disintegrating tablet Take 1 tablet (4 mg total) by mouth every 6 (six) hours as needed for nausea or vomiting, Starting Sat 9/10/2022, Normal      SUMAtriptan (IMITREX) 50 mg tablet Take 1 tablet (50 mg total) by mouth once as needed for migraine for up to 1 dose, Starting Tue 7/26/2022, Normal      topiramate (Topamax) 50 MG tablet Take 1 tablet (50 mg total) by mouth 2 (two) times a day, Starting Thu 6/11/2020, Normal      varenicline (CHANTIX) 1 mg tablet Take 1 tablet (1 mg total) by mouth 2 (two) times a day, Starting Tue 7/26/2022, Normal       !! - Potential duplicate medications found  Please discuss with provider            No discharge procedures on file      PDMP Review     None          ED Provider  Electronically Signed by           Geni Dee DO  09/24/22 5055

## 2022-09-26 LAB
ATRIAL RATE: 92 BPM
P AXIS: 72 DEGREES
PR INTERVAL: 160 MS
QRS AXIS: 68 DEGREES
QRSD INTERVAL: 102 MS
QT INTERVAL: 402 MS
QTC INTERVAL: 497 MS
T WAVE AXIS: 82 DEGREES
VENTRICULAR RATE: 92 BPM

## 2022-09-26 PROCEDURE — 93010 ELECTROCARDIOGRAM REPORT: CPT | Performed by: INTERNAL MEDICINE

## 2022-10-05 ENCOUNTER — TELEPHONE (OUTPATIENT)
Dept: FAMILY MEDICINE CLINIC | Facility: CLINIC | Age: 37
End: 2022-10-05

## 2022-10-30 ENCOUNTER — APPOINTMENT (EMERGENCY)
Dept: CT IMAGING | Facility: HOSPITAL | Age: 37
End: 2022-10-30

## 2022-10-30 ENCOUNTER — HOSPITAL ENCOUNTER (EMERGENCY)
Facility: HOSPITAL | Age: 37
Discharge: HOME | End: 2022-10-30
Attending: EMERGENCY MEDICINE

## 2022-10-30 VITALS
OXYGEN SATURATION: 98 % | DIASTOLIC BLOOD PRESSURE: 87 MMHG | HEIGHT: 65 IN | RESPIRATION RATE: 20 BRPM | SYSTOLIC BLOOD PRESSURE: 169 MMHG | WEIGHT: 293 LBS | BODY MASS INDEX: 48.82 KG/M2 | HEART RATE: 93 BPM | TEMPERATURE: 97.3 F

## 2022-10-30 DIAGNOSIS — A09 ACUTE INFECTIVE GASTROENTERITIS: Primary | ICD-10-CM

## 2022-10-30 LAB
ALBUMIN SERPL BCP-MCNC: 3.3 G/DL (ref 3.5–5)
ALP SERPL-CCNC: 66 U/L (ref 46–116)
ALT SERPL W P-5'-P-CCNC: 34 U/L (ref 12–78)
ANION GAP SERPL CALCULATED.3IONS-SCNC: 8 MMOL/L (ref 4–13)
BASOPHILS # BLD AUTO: 0.06 THOUSANDS/ÂΜL (ref 0–0.1)
BASOPHILS NFR BLD AUTO: 1 % (ref 0–1)
BILIRUB SERPL-MCNC: 0.26 MG/DL (ref 0.2–1)
BILIRUB UR QL STRIP: NEGATIVE
BUN SERPL-MCNC: 8 MG/DL (ref 5–25)
CALCIUM ALBUM COR SERPL-MCNC: 9.6 MG/DL (ref 8.3–10.1)
CALCIUM SERPL-MCNC: 9 MG/DL (ref 8.3–10.1)
CHLORIDE SERPL-SCNC: 104 MMOL/L (ref 96–108)
CLARITY UR: CLEAR
CO2 SERPL-SCNC: 27 MMOL/L (ref 21–32)
COLOR UR: YELLOW
CREAT SERPL-MCNC: 0.64 MG/DL (ref 0.6–1.3)
EOSINOPHIL # BLD AUTO: 0.26 THOUSAND/ÂΜL (ref 0–0.61)
EOSINOPHIL NFR BLD AUTO: 3 % (ref 0–6)
ERYTHROCYTE [DISTWIDTH] IN BLOOD BY AUTOMATED COUNT: 13.2 % (ref 11.6–15.1)
EXT PREG TEST URINE: NEGATIVE
EXT. CONTROL ED NAV: NORMAL
GFR SERPL CREATININE-BSD FRML MDRD: 115 ML/MIN/1.73SQ M
GLUCOSE SERPL-MCNC: 126 MG/DL (ref 65–140)
GLUCOSE UR STRIP-MCNC: NEGATIVE MG/DL
HCT VFR BLD AUTO: 42.9 % (ref 34.8–46.1)
HGB BLD-MCNC: 14.4 G/DL (ref 11.5–15.4)
HGB UR QL STRIP.AUTO: NEGATIVE
IMM GRANULOCYTES # BLD AUTO: 0.06 THOUSAND/UL (ref 0–0.2)
IMM GRANULOCYTES NFR BLD AUTO: 1 % (ref 0–2)
KETONES UR STRIP-MCNC: NEGATIVE MG/DL
LEUKOCYTE ESTERASE UR QL STRIP: NEGATIVE
LIPASE SERPL-CCNC: 150 U/L (ref 73–393)
LYMPHOCYTES # BLD AUTO: 3.17 THOUSANDS/ÂΜL (ref 0.6–4.47)
LYMPHOCYTES NFR BLD AUTO: 33 % (ref 14–44)
MAGNESIUM SERPL-MCNC: 1.7 MG/DL (ref 1.6–2.6)
MCH RBC QN AUTO: 28.7 PG (ref 26.8–34.3)
MCHC RBC AUTO-ENTMCNC: 33.6 G/DL (ref 31.4–37.4)
MCV RBC AUTO: 86 FL (ref 82–98)
MONOCYTES # BLD AUTO: 0.48 THOUSAND/ÂΜL (ref 0.17–1.22)
MONOCYTES NFR BLD AUTO: 5 % (ref 4–12)
NEUTROPHILS # BLD AUTO: 5.72 THOUSANDS/ÂΜL (ref 1.85–7.62)
NEUTS SEG NFR BLD AUTO: 57 % (ref 43–75)
NITRITE UR QL STRIP: NEGATIVE
NRBC BLD AUTO-RTO: 0 /100 WBCS
PH UR STRIP.AUTO: 7 [PH]
PLATELET # BLD AUTO: 276 THOUSANDS/UL (ref 149–390)
PMV BLD AUTO: 9.1 FL (ref 8.9–12.7)
POTASSIUM SERPL-SCNC: 3.4 MMOL/L (ref 3.5–5.3)
PROT SERPL-MCNC: 7.2 G/DL (ref 6.4–8.4)
PROT UR STRIP-MCNC: NEGATIVE MG/DL
RBC # BLD AUTO: 5.01 MILLION/UL (ref 3.81–5.12)
SODIUM SERPL-SCNC: 139 MMOL/L (ref 135–147)
SP GR UR STRIP.AUTO: 1.02 (ref 1–1.03)
UROBILINOGEN UR QL STRIP.AUTO: 0.2 E.U./DL
WBC # BLD AUTO: 9.75 THOUSAND/UL (ref 4.31–10.16)

## 2022-10-30 RX ORDER — METOCLOPRAMIDE HYDROCHLORIDE 5 MG/ML
10 INJECTION INTRAMUSCULAR; INTRAVENOUS ONCE
Status: COMPLETED | OUTPATIENT
Start: 2022-10-30 | End: 2022-10-30

## 2022-10-30 RX ORDER — DIPHENHYDRAMINE HYDROCHLORIDE 50 MG/ML
25 INJECTION INTRAMUSCULAR; INTRAVENOUS ONCE
Status: COMPLETED | OUTPATIENT
Start: 2022-10-30 | End: 2022-10-30

## 2022-10-30 RX ORDER — ONDANSETRON 4 MG/1
4 TABLET, ORALLY DISINTEGRATING ORAL EVERY 6 HOURS PRN
Qty: 20 TABLET | Refills: 0 | Status: SHIPPED | OUTPATIENT
Start: 2022-10-30

## 2022-10-30 RX ORDER — POTASSIUM CHLORIDE 20 MEQ/1
40 TABLET, EXTENDED RELEASE ORAL ONCE
Status: COMPLETED | OUTPATIENT
Start: 2022-10-30 | End: 2022-10-30

## 2022-10-30 RX ORDER — DICYCLOMINE HCL 20 MG
20 TABLET ORAL 2 TIMES DAILY
Qty: 10 TABLET | Refills: 0 | Status: SHIPPED | OUTPATIENT
Start: 2022-10-30 | End: 2022-11-04

## 2022-10-30 RX ORDER — KETOROLAC TROMETHAMINE 30 MG/ML
10 INJECTION, SOLUTION INTRAMUSCULAR; INTRAVENOUS ONCE
Status: COMPLETED | OUTPATIENT
Start: 2022-10-30 | End: 2022-10-30

## 2022-10-30 RX ADMIN — IOHEXOL 100 ML: 350 INJECTION, SOLUTION INTRAVENOUS at 17:41

## 2022-10-30 RX ADMIN — DIPHENHYDRAMINE HYDROCHLORIDE 25 MG: 50 INJECTION, SOLUTION INTRAMUSCULAR; INTRAVENOUS at 17:18

## 2022-10-30 RX ADMIN — METOCLOPRAMIDE HYDROCHLORIDE 10 MG: 5 INJECTION INTRAMUSCULAR; INTRAVENOUS at 17:18

## 2022-10-30 RX ADMIN — KETOROLAC TROMETHAMINE 9.9 MG: 30 INJECTION, SOLUTION INTRAMUSCULAR at 17:18

## 2022-10-30 RX ADMIN — POTASSIUM CHLORIDE 40 MEQ: 1500 TABLET, EXTENDED RELEASE ORAL at 17:44

## 2022-10-30 RX ADMIN — SODIUM CHLORIDE 1000 ML: 0.9 INJECTION, SOLUTION INTRAVENOUS at 17:17

## 2022-10-30 NOTE — DISCHARGE INSTRUCTIONS
You have been prescribed an anti-nausea medicine and antispasm medicine  Please take these according to instructions as needed  If you develop fever, blood in the stool or vomit, or uncontrollable vomiting even with the medication you have been prescribed, please go to the ER  You should be rechecked by your regular doctor in the next five days or so

## 2022-10-30 NOTE — Clinical Note
Rosendo Prakash was seen and treated in our emergency department on 10/30/2022  Diagnosis:     Vernell Power  may return to work on return date  She may return on this date: 10/31/2022    Ms Sathish Ma was treated in the [address] [place] [address] ER on 30 October 2022  She was unable to attend her shift on 30 October, but she can return to work for her shift on 31 October  Thank you  If you have any questions or concerns, please don't hesitate to call        Chaim Arceo, DO    ______________________________           _______________          _______________  Hospital Representative                              Date                                Time

## 2022-10-30 NOTE — ED PROVIDER NOTES
History  Chief Complaint   Patient presents with   • Abdominal Pain     Patient states on and off abdominal pain, nausea, vomiting, diarrhea since having covid last month  This is a 31-year-old woman with the noted past medical history who presents to the emergency department with about five days of suprapubic and bilateral lower quadrant abdominal pain with multiple episodes of nonbilious/nonbloody vomiting preceded by one day by bifrontal headache with some degree of photophobia with the development of three episodes of nonbloody diarrhea within the 2 hours prior to ED arrival   Pain is unaffected by urination or defecation  It is slightly worse with position change  Initial symptom was bifrontal headache occurring about one day prior to onset of abdominal pain and nausea  This was distinct from migraine headaches that she has with some frequency as the migraines usually involve the entire head  She did have photophobia with the headache  Headache is not worse with position change  No associated neck rigidity  No visual changes per se  Patient was seen in this emergency department by me in late September 2022 for epigastric abdominal pain; workup at that point was unrevealing as to etiology of patient's symptoms  She identifies the current symptoms as distinct from that episode  She did have COVID-19 disease in early September 2022  She had fully recovered from that episode by the time that I had seen her in the emergency department as well as of course prior to her current presentation  She has had prior cholecystectomy as well as tubal ligation  She has not had sick contacts with anyone within the past 14 days of which she is aware  No antibiotic use in past 30 days  No objective fever with current symptoms  No dysuria/hematuria/urgency/frequency  No hematochezia/melena  No abdominal distension       A/p:  40 year woman presenting to the emergency department with headache followed by GI symptoms with reproducible abdominal tenderness on examination  She has normal neurologic examination  The concern for primary CNS etiology is low  Overall presentation concerning for an upper/lower GI infectious etiology (infectious colitis, infectious enteritis, etc )  versus pyelonephritis  Check cbc/cmp/lipase/mag/ua/upt  Ct a/p with IV contrast  Symptomatic treatment while workup ongoing  History provided by:  Patient and medical records  Abdominal Pain  Associated symptoms: diarrhea, fatigue, nausea and vomiting    Associated symptoms: no chest pain, no chills, no cough, no dysuria, no fever, no shortness of breath and no sore throat        Prior to Admission Medications   Prescriptions Last Dose Informant Patient Reported? Taking? albuterol (2 5 mg/3 mL) 0 083 % nebulizer solution   No Yes   Sig: Take 3 mL (2 5 mg total) by nebulization every 6 (six) hours as needed for wheezing or shortness of breath   albuterol (Ventolin HFA) 90 mcg/act inhaler   No Yes   Sig: Inhale 2 puffs every 4 (four) hours as needed for wheezing or shortness of breath   fluticasone-salmeterol (Advair HFA) 45-21 MCG/ACT inhaler   No Yes   Sig: Inhale 2 puffs 2 (two) times a day Rinse mouth after use        Facility-Administered Medications: None       Past Medical History:   Diagnosis Date   • Anxiety    • Asthma    • B[facility] disorder ([facility] )    • Depression    • GERD (gastroesophageal reflux disease)    • Hypercholesterolemia        Past Surgical History:   Procedure Laterality Date   • CHOLECYSTECTOMY     • FL INJECTION RIGHT HIP (ARTHROGRAM)  11/23/2020   • FL INJECTION RIGHT HIP (NON ARTHROGRAM)  8/10/2020   • TONSILLECTOMY     • TUBAL LIGATION         Family History   Problem Relation Age of Onset   • Mental illness Mother    • Depression Mother    • Cancer Mother    • Hyperlipidemia Mother    • Asthma Father    • Arthritis Father    • Cancer Brother    • Psoriasis Daughter      I have reviewed and agree with the history as documented  E-Cigarette/Vaping   • E-Cigarette Use Never User      E-Cigarette/Vaping Substances   • Nicotine No    • THC No    • CBD No    • Flavoring No    • Other No    • Unknown No      Social History     Tobacco Use   • Smoking status: Current Every Day Smoker     Packs/day: 0 50     Types: Cigarettes   • Smokeless tobacco: Never Used   Vaping Use   • Vaping Use: Never used   Substance Use Topics   • Alcohol use: Never   • Drug use: Not Currently       Review of Systems   Constitutional: Positive for fatigue  Negative for chills and fever  HENT: Negative for congestion and sore throat  Eyes: Positive for photophobia  Negative for visual disturbance  Respiratory: Negative for cough and shortness of breath  Cardiovascular: Negative for chest pain and palpitations  Gastrointestinal: Positive for abdominal pain, diarrhea, nausea and vomiting  Negative for blood in stool  Genitourinary: Negative for difficulty urinating, dysuria and flank pain  Skin: Negative for color change, pallor, rash and wound  Neurological: Positive for headaches  Negative for dizziness, syncope, weakness, light-headedness and numbness  Hematological: Negative for adenopathy  Does not bruise/bleed easily  All other systems reviewed and are negative  Physical Exam  Physical Exam  Vitals and nursing note reviewed  Constitutional:       General: She is awake  She is in acute distress (mild painful distress)  Appearance: Normal appearance  She is well-developed  HENT:      Head: Normocephalic and atraumatic  Right Ear: Hearing and external ear normal       Left Ear: Hearing and external ear normal    Eyes:      General: Lids are normal  Vision grossly intact  Extraocular Movements: Extraocular movements intact  Conjunctiva/sclera: Conjunctivae normal       Pupils: Pupils are equal, round, and reactive to light  Neck:      Thyroid: No thyroid mass, thyromegaly or thyroid tenderness  Trachea: Trachea and phonation normal    Cardiovascular:      Rate and Rhythm: Normal rate and regular rhythm  Pulses:           Radial pulses are 2+ on the right side and 2+ on the left side  Dorsalis pedis pulses are 2+ on the right side and 2+ on the left side  Posterior tibial pulses are 2+ on the right side and 2+ on the left side  Heart sounds: Normal heart sounds, S1 normal and S2 normal  No murmur heard  No friction rub  No gallop  Pulmonary:      Effort: Pulmonary effort is normal  No respiratory distress  Breath sounds: Normal breath sounds  No stridor  No decreased breath sounds, wheezing, rhonchi or rales  Abdominal:      General: There is no distension  Palpations: There is no mass  Tenderness: There is abdominal tenderness in the right lower quadrant, suprapubic area and left lower quadrant  There is right CVA tenderness and left CVA tenderness  There is no guarding or rebound  Musculoskeletal:      Cervical back: No rigidity  No pain with movement, spinous process tenderness or muscular tenderness  Normal range of motion  Skin:     General: Skin is warm and dry  Neurological:      Mental Status: She is alert and oriented to person, place, and time  GCS: GCS eye subscore is 4  GCS verbal subscore is 5  GCS motor subscore is 6  Cranial Nerves: No cranial nerve deficit  Sensory: No sensory deficit  Motor: No abnormal muscle tone  Comments: PERRLA; EOMI  Sensation intact to light touch over face in V1-V3 distribution bilaterally  Facial expressions symmetric  Tongue/uvula midline  Shoulder shrug equal bilaterally  Strength 5/5 in UE/LE bilaterally  Sensation intact to light touch in UE/LE bilaterally           Vital Signs  ED Triage Vitals [10/30/22 1637]   Temperature Pulse Respirations Blood Pressure SpO2   (!) 97 3 °F (36 3 °C) 93 20 169/87 100 %      Temp Source Heart Rate Source Patient Position - Orthostatic VS BP Location FiO2 (%)   Tympanic -- Lying Left arm --      Pain Score       8           Vitals:    10/30/22 1637 10/30/22 1645   BP: 169/87 169/87   Pulse: 93 93   Patient Position - Orthostatic VS: Lying          Visual Acuity      ED Medications  Medications   metoclopramide (REGLAN) injection 10 mg (10 mg Intravenous Given 10/30/22 1718)   diphenhydrAMINE (BENADRYL) injection 25 mg (25 mg Intravenous Given 10/30/22 1718)   sodium chloride 0 9 % bolus 1,000 mL (0 mL Intravenous Stopped 10/30/22 1817)   ketorolac (TORADOL) injection 9 9 mg (9 9 mg Intravenous Given 10/30/22 1718)   potassium chloride (K-DUR,KLOR-CON) CR tablet 40 mEq (40 mEq Oral Given 10/30/22 1744)   iohexol (OMNIPAQUE) 350 MG/ML injection (SINGLE-DOSE) 100 mL (100 mL Intravenous Given 10/30/22 1741)       Diagnostic Studies  Results Reviewed     Procedure Component Value Units Date/Time    [facility] [[id number]]  (Abnormal) Collected: 10/30/22 1707    Lab Status: Final result Specimen: Blood from Arm, Left Updated: 10/30/22 1727     Sodium 139 mmol/L      Potassium 3 4 mmol/L      Chloride 104 mmol/L      CO2 27 mmol/L      ANION GAP 8 mmol/L      BUN 8 mg/dL      Creatinine 0 64 mg/dL      Glucose 126 mg/dL      Calcium 9 0 mg/dL      Corrected Calcium 9 6 mg/dL      AST --     ALT 34 U/L      Alkaline Phosphatase 66 U/L      Total Protein 7 2 g/dL      Albumin 3 3 g/dL      Total Bilirubin 0 26 mg/dL      eGFR 115 ml/min/1 73sq m     Narrative:      Ankit guidelines for Chronic Kidney Disease (CKD):   •  Stage 1 with normal or high GFR (GFR > 90 mL/min/1 73 square meters)  •  Stage 2 Mild CKD (GFR = 60-89 mL/min/1 73 square meters)  •  Stage 3A Moderate CKD (GFR = 45-59 mL/min/1 73 square meters)  •  Stage 3B Moderate CKD (GFR = 30-44 mL/min/1 73 square meters)  •  Stage 4 Severe CKD (GFR = 15-29 mL/min/1 73 square meters)  •  Stage 5 End Stage CKD (GFR <15 mL/min/1 73 square meters)  Note: GFR calculation is accurate only with a steady state creatinine    Lipase [[id number]]  (Normal) Collected: 10/30/22 1707    Lab Status: Final result Specimen: Blood from Arm, Left Updated: 10/30/22 1727     Lipase 150 u/L     Magnesium [[id number]]  (Normal) Collected: 10/30/22 1707    Lab Status: Final result Specimen: Blood from Arm, Left Updated: 10/30/22 1726     Magnesium 1 7 mg/dL     UA w Reflex to Microscopic w Reflex to Culture [[id number]] Collected: 10/30/22 1720    Lab Status: Final result Specimen: Urine, Clean Catch Updated: 10/30/22 1726     Color, UA Yellow     Clarity, UA Clear     Specific [place], UA 1 020     pH, UA 7 0     Leukocytes, UA Negative     Nitrite, UA Negative     Protein, UA Negative mg/dl      Glucose, UA Negative mg/dl      Ketones, UA Negative mg/dl      Urobilinogen, UA 0 2 E U /dl      Bilirubin, UA Negative     Occult Blood, UA Negative    POCT pregnancy, urine [[id number]]  (Normal) Resulted: 10/30/22 1718    Lab Status: Final result Updated: 10/30/22 1718     EXT PREG TEST UR (Ref: Negative) negative     Control valid    CBC and differential [[id number]] Collected: 10/30/22 1707    Lab Status: Final result Specimen: Blood from Arm, Left Updated: 10/30/22 1712     WBC 9 75 Thousand/uL      RBC 5 01 Million/uL      Hemoglobin 14 4 g/dL      Hematocrit 42 9 %      MCV 86 fL      MCH 28 7 pg      MCHC 33 6 g/dL      RDW 13 2 %      MPV 9 1 fL      Platelets 795 Thousands/uL      nRBC 0 /100 WBCs      Neutrophils Relative 57 %      Immat GRANS % 1 %      Lymphocytes Relative 33 %      Monocytes Relative 5 %      Eosinophils Relative 3 %      Basophils Relative 1 %      Neutrophils Absolute 5 72 Thousands/µL      Immature Grans Absolute 0 06 Thousand/uL      Lymphocytes Absolute 3 17 Thousands/µL      Monocytes Absolute 0 48 Thousand/µL      Eosinophils Absolute 0 26 Thousand/µL      Basophils Absolute 0 06 Thousands/µL                  CT abdomen pelvis with contrast   Final Result by Amy Shine DO (10/30 1837) 1   No CT evidence of acute process within the abdomen or pelvis  Workstation performed: [id number]                    Procedures  Procedures         ED Course  ED Course as of 10/30/22 2334   Sun Oct 30, 2022   1718 CBC and differential  WBC wnl  Hg/Hct wnl  Plt wnl   1720 POCT pregnancy, urine  Negative   1728 UA w Reflex to Microscopic w Reflex to Culture  Moderately concentrated but otherwise normal   1729 Lipase  Normal   1729 Magnesium  Normal   1729 CMP(!)  Mild hypokalemia  Transaminases normal    1802 CT completed and awaiting interpretation   1850 CT abdomen pelvis with contrast  FINDINGS:     ABDOMEN     LOWER CHEST:  No clinically significant abnormality identified in the visualized lower chest      LIVER/BILIARY TREE:  Unremarkable      GALLBLADDER:  Gallbladder is surgically absent      SPLEEN:  Unremarkable      PANCREAS:  Unremarkable      ADRENAL GLANDS:  Unremarkable      KIDNEYS/URETERS:  Unremarkable  No hydronephrosis      STOMACH AND BOWEL:  No small bowel dilatation  There are few scattered colonic diverticula without evidence of acute inflammatory changes      APPENDIX:  No findings to suggest appendicitis      ABDOMINOPELVIC CAVITY:  No ascites  No pneumoperitoneum  No lymphadenopathy      VESSELS:  Unremarkable for patient's age      PELVIS     REPRODUCTIVE ORGANS:  Unremarkable for patient's age      URINARY BLADDER:  Incompletely distended, grossly unremarkable      ABDOMINAL WALL/INGUINAL REGIONS:  Unremarkable      OSSEOUS STRUCTURES:  No acute fracture or destructive osseous lesion      IMPRESSION:  1  No CT evidence of acute process within the abdomen or pelvis   1916 CT results reviewed with patient  No specific findings either from lab tests or imaging to account for symptoms  Overall picture with both upper and lower GI symptoms and systemic symptoms might be consistent with a gastroenteritis picture  Reviewed typical etiology of symptoms and typical course  Reasonable to treat with anti spasmodic and antiemetic with close follow-up to primary physician  ED return at any point for significantly increasing symptoms/inability to tolerate p o /fever/GI bleeding  All questions were answered to patient's satisfaction prior to discharge  She expressed understanding and agreed to plan  MDM    Disposition  Final diagnoses:   Acute infective gastroenteritis     Time reflects when diagnosis was documented in both MDM as applicable and the Disposition within this note     Time User Action Codes Description Comment    10/30/2022  7:20 PM Dharmesh Kirkpatrick S White  Acute infective gastroenteritis       ED Disposition     ED Disposition   Discharge    Condition   Stable    Date/Time   Sun Oct 30, 2022  7:19 PM    Comment   Miryam Santos discharge to home/self care  Follow-up Information    None         Discharge Medication List as of 10/30/2022  7:21 PM      START taking these medications    Details   dicyclomine (BENTYL) 20 mg tablet Take 1 tablet (20 mg total) by mouth 2 (two) times a day for 5 days, Starting Sun 10/30/2022, Until Fri 11/4/2022, Normal      ondansetron (Zofran ODT) 4 mg disintegrating tablet Take 1 tablet (4 mg total) by mouth every 6 (six) hours as needed for nausea or vomiting, Starting Sun 10/30/2022, Normal         CONTINUE these medications which have NOT CHANGED    Details   albuterol (2 5 mg/3 mL) 0 083 % nebulizer solution Take 3 mL (2 5 mg total) by nebulization every 6 (six) hours as needed for wheezing or shortness of breath, Starting Tue 7/26/2022, Normal      albuterol (Ventolin HFA) 90 mcg/act inhaler Inhale 2 puffs every 4 (four) hours as needed for wheezing or shortness of breath, Starting Tue 7/26/2022, Normal      fluticasone-salmeterol (Advair HFA) 45-21 MCG/ACT inhaler Inhale 2 puffs 2 (two) times a day Rinse mouth after use , Starting Tue 7/26/2022, Normal             No discharge procedures on file      PDMP Review     None          ED Provider  Electronically Signed by           Tracey Chaudhry DO  10/30/22 5446

## 2022-11-07 ENCOUNTER — HOSPITAL ENCOUNTER (EMERGENCY)
Facility: HOSPITAL | Age: 37
Discharge: HOME | End: 2022-11-08
Attending: EMERGENCY MEDICINE

## 2022-11-07 VITALS
OXYGEN SATURATION: 98 % | SYSTOLIC BLOOD PRESSURE: 146 MMHG | RESPIRATION RATE: 19 BRPM | DIASTOLIC BLOOD PRESSURE: 70 MMHG | TEMPERATURE: 98 F | HEART RATE: 96 BPM

## 2022-11-07 DIAGNOSIS — R07.89 CHEST WALL PAIN: ICD-10-CM

## 2022-11-07 DIAGNOSIS — G43.909 MIGRAINE: Primary | ICD-10-CM

## 2022-11-07 NOTE — Clinical Note
Oni Marie was seen and treated in our emergency department on 11/7/2022  No restrictions            Diagnosis: migraine headache, chest pain    Daksha Wolff  may return to work on return date  She may return on this date: 11/08/2022    Please excuse from work on 11/7/22  If you have any questions or concerns, please don't hesitate to call        Silvano Wheat DO    ______________________________           _______________          _______________  Hospital Representative                              Date                                Time

## 2022-11-08 LAB
ATRIAL RATE: 88 BPM
P AXIS: 73 DEGREES
PR INTERVAL: 152 MS
QRS AXIS: 64 DEGREES
QRSD INTERVAL: 102 MS
QT INTERVAL: 378 MS
QTC INTERVAL: 457 MS
T WAVE AXIS: 67 DEGREES
VENTRICULAR RATE: 88 BPM

## 2022-11-08 RX ORDER — METOCLOPRAMIDE HYDROCHLORIDE 5 MG/ML
10 INJECTION INTRAMUSCULAR; INTRAVENOUS ONCE
Status: COMPLETED | OUTPATIENT
Start: 2022-11-08 | End: 2022-11-08

## 2022-11-08 RX ORDER — KETOROLAC TROMETHAMINE 30 MG/ML
15 INJECTION, SOLUTION INTRAMUSCULAR; INTRAVENOUS ONCE
Status: COMPLETED | OUTPATIENT
Start: 2022-11-08 | End: 2022-11-08

## 2022-11-08 RX ADMIN — KETOROLAC TROMETHAMINE 15 MG: 30 INJECTION, SOLUTION INTRAMUSCULAR at 00:12

## 2022-11-08 RX ADMIN — SODIUM CHLORIDE 1000 ML: 0.9 INJECTION, SOLUTION INTRAVENOUS at 00:11

## 2022-11-08 RX ADMIN — METOCLOPRAMIDE HYDROCHLORIDE 10 MG: 5 INJECTION INTRAMUSCULAR; INTRAVENOUS at 00:11

## 2022-11-08 NOTE — DISCHARGE INSTRUCTIONS
You have been seen for headaches and chest wall pain  Please continue taking tylenol and excedrin as needed for your symptoms  Return to the emergency department if you develop worsening headaches, fevers, neck stiffness, vision changes, chest pain, trouble breathing or any other symptoms of concern  Please follow up with your PCP by calling the number provided

## 2022-11-09 NOTE — ED PROVIDER NOTES
History  Chief Complaint   Patient presents with   • Medical Problem     Chest Pain and Migraine on and off for a week  History of migraines     Thu Prior is a 40y o  year old female with PMH of migraine headaches, GERD, hypercholesterolemia presenting to the [facility] ED for headaches and chest pain  Patient presents with bifrontal, nonradiating headache worsening over the past several hours  Feels similar to prior migraine headaches  Not sudden onset or maximal intensity at onset  Gradually worsening  Associated blurring of vision and photophobia  Patient denies neck stiffness, weakness/numbness/tingling in extremities or vomiting  Patient has taken excedrin at home for symptomatic treatment  Patient not on OCP medications  Patient also reporting intermittent left sided chest discomfort over the past week  Rated as mild, nonradiating and without associated diaphoresis, lightheadedness, dyspnea or N/V/D  Patient denies personal or family history of DVT/PE  Denies unilateral leg pain/swelling  Not currently on estrogen-containing contraceptive medication  No recent travel  History provided by:  Patient   used: No    Medical Problem  Associated symptoms: chest pain and headaches    Associated symptoms: no abdominal pain, no congestion, no cough, no diarrhea, no fever, no nausea, no rash, no rhinorrhea, no shortness of breath and no vomiting        Prior to Admission Medications   Prescriptions Last Dose Informant Patient Reported? Taking?    albuterol (2 5 mg/3 mL) 0 083 % nebulizer solution   No No   Sig: Take 3 mL (2 5 mg total) by nebulization every 6 (six) hours as needed for wheezing or shortness of breath   albuterol (Ventolin HFA) 90 mcg/act inhaler   No No   Sig: Inhale 2 puffs every 4 (four) hours as needed for wheezing or shortness of breath   dicyclomine (BENTYL) 20 mg tablet   No No   Sig: Take 1 tablet (20 mg total) by mouth 2 (two) times a day for 5 days fluticasone-salmeterol (Advair HFA) 45-21 MCG/ACT inhaler   No No   Sig: Inhale 2 puffs 2 (two) times a day Rinse mouth after use  ondansetron (Zofran ODT) 4 mg disintegrating tablet   No No   Sig: Take 1 tablet (4 mg total) by mouth every 6 (six) hours as needed for nausea or vomiting      Facility-Administered Medications: None       Past Medical History:   Diagnosis Date   • Anxiety    • Asthma    • Bipolar 1 disorder (HCC)    • Depression    • GERD (gastroesophageal reflux disease)    • Hypercholesterolemia        Past Surgical History:   Procedure Laterality Date   • CHOLECYSTECTOMY     • FL INJECTION RIGHT HIP (ARTHROGRAM)  11/23/2020   • FL INJECTION RIGHT HIP (NON ARTHROGRAM)  8/10/2020   • TONSILLECTOMY     • TUBAL LIGATION         Family History   Problem Relation Age of Onset   • Mental illness Mother    • Depression Mother    • Cancer Mother    • Hyperlipidemia Mother    • Asthma Father    • Arthritis Father    • Cancer Brother    • Psoriasis Daughter      I have reviewed and agree with the history as documented  E-Cigarette/Vaping   • E-Cigarette Use Never User      E-Cigarette/Vaping Substances   • Nicotine No    • THC No    • CBD No    • Flavoring No    • Other No    • Unknown No      Social History     Tobacco Use   • Smoking status: Current Every Day Smoker     Packs/day: 0 50     Types: Cigarettes   • Smokeless tobacco: Never Used   Vaping Use   • Vaping Use: Never used   Substance Use Topics   • Alcohol use: Never   • Drug use: Not Currently       Review of Systems   Constitutional: Negative for chills and fever  HENT: Negative for congestion and rhinorrhea  Eyes: Positive for photophobia and visual disturbance  Respiratory: Negative for cough, chest tightness and shortness of breath  Cardiovascular: Positive for chest pain  Negative for leg swelling  Gastrointestinal: Negative for abdominal distention, abdominal pain, diarrhea, nausea and vomiting     Endocrine: Negative for polyuria  Genitourinary: Negative for difficulty urinating, dysuria, flank pain and hematuria  Musculoskeletal: Negative for arthralgias, neck pain and neck stiffness  Skin: Negative for rash  Neurological: Positive for headaches  Negative for dizziness, seizures, syncope, facial asymmetry, speech difficulty, weakness, light-headedness and numbness  Psychiatric/Behavioral: Negative for behavioral problems and confusion  All other systems reviewed and are negative  Physical Exam  Physical Exam  Vitals and nursing note reviewed  Constitutional:       General: She is not in acute distress  Appearance: Normal appearance  She is well-developed  She is not ill-appearing, toxic-appearing or diaphoretic  HENT:      Head: Normocephalic and atraumatic  Nose: No congestion or rhinorrhea  Eyes:      General:         Right eye: No discharge  Left eye: No discharge  Extraocular Movements: Extraocular movements intact  Pupils: Pupils are equal, round, and reactive to light  Cardiovascular:      Rate and Rhythm: Normal rate and regular rhythm  Pulmonary:      Effort: Pulmonary effort is normal  No accessory muscle usage or respiratory distress  Breath sounds: Normal breath sounds  No stridor  No decreased breath sounds, wheezing, rhonchi or rales  Chest:      Chest wall: Tenderness (left medial chest wall) present  Abdominal:      General: Bowel sounds are normal  There is no distension  Palpations: Abdomen is soft  Tenderness: There is no abdominal tenderness  There is no guarding or rebound  Musculoskeletal:      Cervical back: Normal range of motion and neck supple  No rigidity  Right lower leg: No tenderness  No edema  Left lower leg: No tenderness  No edema  Skin:     Capillary Refill: Capillary refill takes less than 2 seconds  Findings: No rash  Neurological:      Mental Status: She is alert and oriented to person, place, and time  GCS: GCS eye subscore is 4  GCS verbal subscore is 5  GCS motor subscore is 6  Cranial Nerves: No cranial nerve deficit, dysarthria or facial asymmetry  Sensory: Sensation is intact  Motor: Motor function is intact  Comments: Strength +5/5 in bilateral UE/LE  No vertical nystagmus noted  CN III, IV and VI intact  Cerebellar testing: Normal FNF without ataxia  No pronator drift noted  Psychiatric:         Mood and Affect: Mood normal          Behavior: Behavior normal          Vital Signs  ED Triage Vitals   Temperature Pulse Respirations Blood Pressure SpO2   11/07/22 2330 11/07/22 2330 11/07/22 2330 11/07/22 2330 11/07/22 2330   98 °F (36 7 °C) 96 19 146/70 98 %      Temp src Heart Rate Source Patient Position - Orthostatic VS BP Location FiO2 (%)   -- 11/07/22 2330 -- -- --    Monitor         Pain Score       11/07/22 2331       7           Vitals:    11/07/22 2330   BP: 146/70   Pulse: 96         Visual Acuity      ED Medications  Medications   ketorolac (TORADOL) injection 15 mg (15 mg Intravenous Given 11/8/22 0012)   sodium chloride 0 9 % bolus 1,000 mL (0 mL Intravenous Stopped 11/8/22 0103)   metoclopramide (REGLAN) injection 10 mg (10 mg Intravenous Given 11/8/22 0011)       Diagnostic Studies  Results Reviewed     None                 No orders to display              Procedures  Procedures         ED Course  ED Course as of 11/09/22 0946   [facility] Nov 07, 2022   2334 Procedure Note: EKG  Date/Time: 11/07/22 11:34 PM   Interpreted by: Jennifer Grider  Indications / Diagnosis: Chest pain  ECG reviewed by me, the ED Provider: yes   The EKG demonstrates:  Rhythm: normal sinus rhythm 88 BPM  Intervals: Normal RI and QT intervals  Axis: Normal axis  QRS/Blocks: Normal QRS  ST Changes: No acute ST/T waves changes  No ADONIS  No TWI  Tue Nov 08, 2022   0047 Patient sleeping on repeat assessment  Patient well appearing, she feels much better  Will plan for discharge at this time  PERC Rule for PE    Flowsheet Row Most Recent Value   PERC Rule for PE    Age >=50 0 Filed at: 11/07/2022 2345   HR >=100 0 Filed at: 11/07/2022 2345   O2 Sat on room air < 95% 0 Filed at: 11/07/2022 2345   History of PE or DVT 0 Filed at: 11/07/2022 2345   Recent trauma or surgery 0 Filed at: 11/07/2022 2345   Hemoptysis 0 Filed at: 11/07/2022 2345   Exogenous estrogen 0 Filed at: 11/07/2022 2345   Unilateral leg swelling 0 Filed at: 11/07/2022 2345   PERC Rule for PE Results 0 Filed at: 11/07/2022 2345                  Milan Garberrle' Criteria for PE    Flowsheet Row Most Recent Value   Wells' Criteria for PE    Clinical signs and symptoms of DVT 0 Filed at: 11/07/2022 2345   PE is primary diagnosis or equally likely 0 Filed at: 11/07/2022 2345   HR >100 0 Filed at: 11/07/2022 2345   Immobilization at least 3 days or Surgery in the previous 4 weeks 0 Filed at: 11/07/2022 2345   Previous, objectively diagnosed PE or DVT 0 Filed at: 11/07/2022 2345   Hemoptysis 0 Filed at: 11/07/2022 2345   Malignancy with treatment within 6 months or palliative 0 Filed at: 11/07/2022 2345   Milan Andrews' Criteria Total 0 Filed at: 11/07/2022 2345                MDM  Number of Diagnoses or Management Options  Chest wall pain  Migraine  Diagnosis management comments:   40 y o  female presenting for headaches and chest wall pain  VSS, nontoxic appearing  Chest pain reproduced on exam   Doubt ACS or PE  Will check EKG  Symptoms more likely chest wall pain  Headache consistent with prior migraine headaches, not sudden onset/maximal intensity and no associated neurologic deficits on exam  Will defer imaging at this time and treat symptomatically  Reassessment: Patient feeling much better, requests discharge from ED  Disposition: I have discussed with the patient our plan to discharge them from the ED and the patient is in agreement with this plan  Discharge Plan: PRN excedrin, tylenol  RTED precautions emphasized  The patient was provided a written after visit summary with strict RTED precautions  Followup: I have discussed with the patient plan to follow up with their PCP  Contact information provided in AVS        Amount and/or Complexity of Data Reviewed  Clinical lab tests: ordered and reviewed  Review and summarize past medical records: yes    Patient Progress  Patient progress: resolved      Disposition  Final diagnoses:   Migraine   Chest wall pain     Time reflects when diagnosis was documented in both MDM as applicable and the Disposition within this note     Time User Action Codes Description Comment    11/8/2022 12:48 AM Idania Hickey Add [G43 909] Migraine     11/8/2022 12:48 AM Idania Hickey Add [R07 89] Chest wall pain       ED Disposition     ED Disposition   Discharge    Condition   Stable    Date/Time   Tue Nov 8, 2022 12:48 AM    Comment   Oni Marie discharge to home/self care  Follow-up Information     Follow up With Specialties Details Why Contact [facility] Schedule an appointment as soon as possible for a visit  To make appointment for reevaluation in 3-5 days   [address] [facility] [address]  [address]  [phone]            Discharge Medication List as of 11/8/2022 12:49 AM      CONTINUE these medications which have NOT CHANGED    Details   albuterol (2 5 mg/3 mL) 0 083 % nebulizer solution Take 3 mL (2 5 mg total) by nebulization every 6 (six) hours as needed for wheezing or shortness of breath, Starting Tue 7/26/2022, Normal      albuterol (Ventolin HFA) 90 mcg/act inhaler Inhale 2 puffs every 4 (four) hours as needed for wheezing or shortness of breath, Starting Tue 7/26/2022, Normal      dicyclomine (BENTYL) 20 mg tablet Take 1 tablet (20 mg total) by mouth 2 (two) times a day for 5 days, Starting Sun 10/30/2022, Until Fri 11/4/2022, Normal      fluticasone-salmeterol (Advair HFA) 45-21 MCG/ACT inhaler Inhale 2 puffs 2 (two) times a day Rinse mouth after use , Starting Tue 7/26/2022, Normal      ondansetron (Zofran ODT) 4 mg disintegrating tablet Take 1 tablet (4 mg total) by mouth every 6 (six) hours as needed for nausea or vomiting, Starting Sun 10/30/2022, Normal             No discharge procedures on file      PDMP Review     None          ED Provider  Electronically Signed by           Sid Plummer DO  11/09/22 0134

## 2022-11-14 ENCOUNTER — HOSPITAL ENCOUNTER (EMERGENCY)
Facility: HOSPITAL | Age: 37
Discharge: HOME | End: 2022-11-14
Attending: EMERGENCY MEDICINE

## 2022-11-14 ENCOUNTER — APPOINTMENT (EMERGENCY)
Dept: RADIOLOGY | Facility: HOSPITAL | Age: 37
End: 2022-11-14

## 2022-11-14 VITALS
DIASTOLIC BLOOD PRESSURE: 99 MMHG | WEIGHT: 290.57 LBS | TEMPERATURE: 97.6 F | SYSTOLIC BLOOD PRESSURE: 148 MMHG | HEART RATE: 99 BPM | OXYGEN SATURATION: 97 % | BODY MASS INDEX: 48.41 KG/M2 | HEIGHT: 65 IN | RESPIRATION RATE: 20 BRPM

## 2022-11-14 DIAGNOSIS — M25.561 RIGHT KNEE PAIN: ICD-10-CM

## 2022-11-14 DIAGNOSIS — M25.551 RIGHT HIP PAIN: Primary | ICD-10-CM

## 2022-11-14 RX ORDER — NAPROXEN 500 MG/1
500 TABLET ORAL 2 TIMES DAILY WITH MEALS
Qty: 14 TABLET | Refills: 0 | Status: SHIPPED | OUTPATIENT
Start: 2022-11-14 | End: 2022-12-07

## 2022-11-14 RX ORDER — ACETAMINOPHEN 325 MG/1
975 TABLET ORAL ONCE
Status: DISCONTINUED | OUTPATIENT
Start: 2022-11-14 | End: 2022-11-14 | Stop reason: HOSPADM

## 2022-11-14 RX ORDER — KETOROLAC TROMETHAMINE 30 MG/ML
30 INJECTION, SOLUTION INTRAMUSCULAR; INTRAVENOUS ONCE
Status: DISCONTINUED | OUTPATIENT
Start: 2022-11-14 | End: 2022-11-14 | Stop reason: HOSPADM

## 2022-11-14 NOTE — Clinical Note
Frank Harrismicki was seen and treated in our emergency department on 11/14/2022  Diagnosis:     Enoc Fragoso  may return to work on return date  She may return on this date: 11/16/2022         If you have any questions or concerns, please don't hesitate to call        Nahomi Sims MD    ______________________________           _______________          _______________  Hospital Representative                              Date                                Time

## 2022-11-15 NOTE — ED PROVIDER NOTES
Final Diagnosis:  1  Right hip pain    2  Right knee pain        Chief Complaint   Patient presents with   • Hip Pain     Patient states she fell down about four step and hurt her right hip and knee  Patient denies any headstrike/LOC/thinners  HPI  Patient presents for evaluation of right hip and knee pain  Patient states that she was on her way down some stairs to talk to her children she believes that she may have misstepped and fell down hard onto her right knee  The knee on the right knee and hip pain  Patient was able then to get up under her own power  She presents now for further evaluation  She identifies the pain as being in her knee and right hip with occasional radiation back and forth  This worse with ambulation better at rest   She has taken nothing to help with the pain  Denies a history of surgical intervention to get OTC joints  No other injuries  Unless otherwise specified:  - No language barrier    - History obtained from patient  - There are no limitations to the history obtained  - Previous charting was reviewed    PMH:   has a past medical history of Anxiety, Asthma, Bipolar 1 disorder ([address] ), Depression, GERD (gastroesophageal reflux disease), and Hypercholesterolemia  PSH:   has a past surgical history that includes Cholecystectomy; Tubal ligation; Tonsillectomy; FL injection right hip (non arthrogram) (8/10/2020); and FL injection right hip (arthrogram) (11/23/2020)  ROS:  Review of Systems   -   - 13 point ROS was performed and all are normal unless stated in the history above  - Nursing note reviewed  Vitals reviewed  - Orders placed by myself and/or advanced practitioner / resident  PE:   Vitals:    11/14/22 1915   BP: 148/99   BP Location: Right arm   Pulse: 99   Resp: 20   Temp: 97 6 °F (36 4 °C)   TempSrc: Temporal   SpO2: 97%   Weight: 132 kg (290 lb 9 1 oz)   Height: 5' 5" (1 651 m)     Vitals reviewed by me       Patient is able to ambulate with slight limp secondary to pain  Patient has no obvious signs of trauma of the right hip  No bruising  Mild tenderness palpation in this area  On her knee she has a small abrasion without any full skin breakdown  Generalized tenderness to palpation without any obvious effusion  No deformity  Unless otherwise specified above:    General: VS reviewed  Appears in NAD    Head: Normocephalic, atraumatic  Eyes: EOM-I  No exudate  ENT: Atraumatic external nose and ears  No malocclusion  No stridor  No drooling  Neck: No JVD  CV: No pallor noted  Lungs:   No tachypnea  No respiratory distress    Abdomen:  Soft, non-tender, non-distended    MSK:   No obvious deformity    Skin: Dry, intact  No obvious rash  Neuro: Awake, alert, GCS15, CN II-XII grossly intact  Speaking in full sentences  Motor grossly intact  Psychiatric/Behavioral: Appropriate mood and affect   Exam: deferred    Physical Exam     Procedures   A:  - Nursing note reviewed  XR hip/pelv 2-3 vws right if performed    (Results Pending)   XR knee 4+ vw right injury    (Results Pending)     Orders Placed This Encounter   Procedures   • XR hip/pelv 2-3 vws right if performed   • XR knee 4+ vw right injury     Labs Reviewed - No data to display      Final Diagnosis:  1  Right hip pain    2  Right knee pain        P:  - x-rays without acute findings  Will provide allergies  Discussed return precautions as well as follow-up      Medications   acetaminophen (TYLENOL) tablet 975 mg (has no administration in time range)   ketorolac (TORADOL) injection 30 mg (has no administration in time range)     Time reflects when diagnosis was documented in both MDM as applicable and the Disposition within this note     Time User Action Codes Description Comment    11/14/2022  7:37 PM Bunny Pipe Add [M25 551] Right hip pain     11/14/2022  7:37 PM Bunny Pipe Add [M25 561] Right knee pain       ED Disposition     ED Disposition   Discharge    Condition   Stable    Date/Time   Mon Nov 14, 2022  7:37 PM    Comment   Mert Bella discharge to home/self care  Follow-up Information     Follow up With Specialties Details Why Contact Info Additional [address] [facility],[address], [address] [address]  [address] [facility]   [address]  [place] [id number] [address] [facility] Iain Wilks [address] [facility], [place], [place], [address]        Patient's Medications   Discharge Prescriptions    NAPROXEN (NAPROSYN) 500 MG TABLET    Take 1 tablet (500 mg total) by mouth 2 (two) times a day with meals for 7 days       Start Date: 11/14/2022End Date: 11/21/2022       Order Dose: 500 mg       Quantity: 14 tablet    Refills: 0     No discharge procedures on file  Prior to Admission Medications   Prescriptions Last Dose Informant Patient Reported? Taking? albuterol (2 5 mg/3 mL) 0 083 % nebulizer solution   No No   Sig: Take 3 mL (2 5 mg total) by nebulization every 6 (six) hours as needed for wheezing or shortness of breath   albuterol (Ventolin HFA) 90 mcg/act inhaler   No No   Sig: Inhale 2 puffs every 4 (four) hours as needed for wheezing or shortness of breath   dicyclomine (BENTYL) 20 mg tablet   No No   Sig: Take 1 tablet (20 mg total) by mouth 2 (two) times a day for 5 days   fluticasone-salmeterol (Advair HFA) 45-21 MCG/ACT inhaler   No No   Sig: Inhale 2 puffs 2 (two) times a day Rinse mouth after use  ondansetron (Zofran ODT) 4 mg disintegrating tablet   No No   Sig: Take 1 tablet (4 mg total) by mouth every 6 (six) hours as needed for nausea or vomiting      Facility-Administered Medications: None       Portions of the record may have been created with voice recognition software   Occasional wrong word or "sound a like" substitutions may have occurred due to the inherent limitations of voice recognition software  Read the chart carefully and recognize, using context, where substitutions have occurred      Electronically signed by:  MD Atif Higgins MD  11/14/22 1958

## 2022-11-21 ENCOUNTER — TELEPHONE (OUTPATIENT)
Dept: FAMILY MEDICINE CLINIC | Facility: CLINIC | Age: 37
End: 2022-11-21

## 2022-11-30 ENCOUNTER — TELEPHONE (OUTPATIENT)
Dept: FAMILY MEDICINE CLINIC | Facility: CLINIC | Age: 37
End: 2022-11-30

## 2022-12-07 ENCOUNTER — HOSPITAL ENCOUNTER (EMERGENCY)
Facility: HOSPITAL | Age: 37
Discharge: HOME | End: 2022-12-07
Attending: EMERGENCY MEDICINE

## 2022-12-07 VITALS
OXYGEN SATURATION: 99 % | RESPIRATION RATE: 18 BRPM | TEMPERATURE: 97.8 F | WEIGHT: 291.01 LBS | BODY MASS INDEX: 48.43 KG/M2 | SYSTOLIC BLOOD PRESSURE: 159 MMHG | DIASTOLIC BLOOD PRESSURE: 96 MMHG | HEART RATE: 82 BPM

## 2022-12-07 DIAGNOSIS — K04.7 DENTAL INFECTION: Primary | ICD-10-CM

## 2022-12-07 RX ORDER — AMOXICILLIN AND CLAVULANATE POTASSIUM 875; 125 MG/1; MG/1
1 TABLET, FILM COATED ORAL EVERY 12 HOURS
Qty: 14 TABLET | Refills: 0 | Status: SHIPPED | OUTPATIENT
Start: 2022-12-07 | End: 2022-12-14

## 2022-12-07 RX ORDER — KETOROLAC TROMETHAMINE 30 MG/ML
30 INJECTION, SOLUTION INTRAMUSCULAR; INTRAVENOUS ONCE
Status: COMPLETED | OUTPATIENT
Start: 2022-12-07 | End: 2022-12-07

## 2022-12-07 RX ADMIN — KETOROLAC TROMETHAMINE 30 MG: 30 INJECTION, SOLUTION INTRAMUSCULAR; INTRAVENOUS at 10:04

## 2022-12-07 NOTE — Clinical Note
Augustine Garibay accompanied Kanwal Rueda to the emergency department on 12/7/2022  Return date if applicable: ? Radha Cronin was in the emergency room to help his wife  Please excuse him from work on 12/8/2022 to help care for her    If you have any questions or concerns, please don't hesitate to call        Laura Lobato MD

## 2022-12-07 NOTE — DISCHARGE INSTRUCTIONS
Make sure you complete the entire prescription of Antibiotics and take every day's dosage as prescribed  Do not suddenly stop taking, even if you feel better  If you are having a reaction to the medication, contact your PCP or return to the ED  Ibuprofen (brand name Advil) and Acetaminophen (brand name Tylenol) work through slightly different mechanisms and work well together  They can be taken together for a better effect  The recommendation is to take up to 400mg ibuprofen then 2 hours later take up to 1,000mg Tylenol  The goal is that when the ibuprofen starts to fade, the acetaminophen is still working  The ibuprofen can be taken every 6 hours (up to 4 times per day), the acetaminophen every 8 hours (up to 3 times per day)  Ibuprofen should not be taken on an empty stomach, so try to have at least something small to eat before taking it  Tylenol can be taken on an empty stomach

## 2022-12-07 NOTE — ED ATTENDING ATTESTATION
Donaldo Morales DO, saw and evaluated the patient  I have discussed the patient with the resident/non-physician practitioner and agree with the resident's/non-physician practitioner's findings, Plan of Care, and MDM as documented in the resident's/non-physician practitioner's note, except where noted  All available labs and Radiology studies were reviewed  At this point I agree with the current assessment done in the Emergency Department  I have conducted an independent evaluation of this patient including a focused history and a physical exam     ED Note - Brittany Jacques 40 y o  female MRN: [id number]  Unit/Bed#: RM10 Encounter: [id number]    History of Present Illness   HPI  Brittany Jacques is a 40 y o  female who presents for evaluation of pain and tenderness along the left mandibular region  Symptoms onset approximately 2 days ago and been progressively getting worse  No trismus  The pain does radiate up towards the ear  Minimal neck stiffness on the left lateral aspect but patient does have full range of motion of her neck  Patient is able to eat and tolerate secretions  No fever or chills  No other complaints  REVIEW OF SYSTEMS  See HPI for further details  12 systems reviewed and otherwise negative except as noted     Historical Information     PAST MEDICAL HISTORY  Past Medical History:   Diagnosis Date   • Anxiety    • Asthma    • Bipolar 1 disorder ([address] )    • Depression    • GERD (gastroesophageal reflux disease)    • Hypercholesterolemia        FAMILY HISTORY  Family History   Problem Relation Age of Onset   • Mental illness Mother    • Depression Mother    • Cancer Mother    • Hyperlipidemia Mother    • Asthma Father    • Arthritis Father    • Cancer Brother    • Psoriasis Daughter        SOCIAL HISTORY  Social History     Socioeconomic History   • Marital status: Single     Spouse name: None   • Number of children: None   • Years of education: None   • Highest education level: None   Occupational History   • None   Tobacco Use   • Smoking status: Every Day     Packs/day: 0 50     Types: Cigarettes   • Smokeless tobacco: Never   Vaping Use   • Vaping Use: Never used   Substance and Sexual Activity   • Alcohol use: Not Currently   • Drug use: Not Currently   • Sexual activity: Yes     Partners: Male   Other Topics Concern   • None   Social History Narrative   • None     Social Determinants of Health     Financial Resource Strain: Not on file   Food Insecurity: Not on file   Transportation Needs: Not on file   Physical Activity: Not on file   Stress: Not on file   Social Connections: Not on file   Intimate Partner Violence: Not on file   Housing Stability: Not on file       SURGICAL HISTORY  Past Surgical History:   Procedure Laterality Date   • CHOLECYSTECTOMY     • FL INJECTION RIGHT HIP (ARTHROGRAM)  11/23/2020   • FL INJECTION RIGHT HIP (NON ARTHROGRAM)  8/10/2020   • TONSILLECTOMY     • TUBAL LIGATION       Meds/Allergies     CURRENT MEDICATIONS  No current facility-administered medications for this encounter      Current Outpatient Medications:   •  albuterol (2 5 mg/3 mL) 0 083 % nebulizer solution, Take 3 mL (2 5 mg total) by nebulization every 6 (six) hours as needed for wheezing or shortness of breath, Disp: 3 mL, Rfl: 1  •  albuterol (Ventolin HFA) 90 mcg/act inhaler, Inhale 2 puffs every 4 (four) hours as needed for wheezing or shortness of breath, Disp: 18 g, Rfl: 5  •  amoxicillin-clavulanate (AUGMENTIN) 875-125 mg per tablet, Take 1 tablet by mouth every 12 (twelve) hours for 7 days, Disp: 14 tablet, Rfl: 0  •  fluticasone-salmeterol (Advair HFA) 45-21 MCG/ACT inhaler, Inhale 2 puffs 2 (two) times a day Rinse mouth after use , Disp: 12 g, Rfl: 1  (Not in a hospital admission)      ALLERGIES  Allergies   Allergen Reactions   • Medical Tape Rash     Rips skin off     Objective     PHYSICAL EXAM    VITAL SIGNS: Blood pressure 159/96, pulse 82, temperature 97 8 °F (36 6 °C), temperature source Temporal, resp  rate 18, weight 132 kg (291 lb 0 1 oz), last menstrual period 11/12/2022, SpO2 99 %  Constitutional:  Appears well developed and well nourished, no acute distress, non-toxic appearance   Eyes:  PERRL, EOMI, conjunctivae pink, sclerae non-icteric    HENT:  Normocephalic/Atraumatic, no rhinorrhea, mucous membranes moist, no gingival abnormalities, no area of fluctuance along the gingiva, tenderness and mild swelling noted to the left mandibular region in the soft tissues without erythema or obvious focus of induration  TMs clear bilaterally  Neck: normal range of motion, no tenderness, supple   Respiratory:  No respiratory distress, normal breath sounds   Cardiovascular:  Normal rate, normal rhythm    GI:  Soft, no tenderness, non-distended  :  No CVAT, no flank ecchymosis  Musculoskeletal:  No swelling or edema, no tenderness, no deformities  Integument:  Pink, warm, dry, Well hydrated, no rash, no erythema, no bullae   Lymphatic:  No cervical/ tonsillar/ submandibular lymphadenopathy noted   Neurologic:  Awake, Alert & oriented x 3, CN 2-12 intact, no focal neurological deficits, motor function intact  Psychiatric:  Speech and behavior appropriate       ED COURSE and MDM:    Assessment/Plan   Assessment:  Chaz Duncan is a 40 y o  female presents for evaluation of left-sided facial pain/tenderness with concern for dental/ soft tissue/ deep space infection  Plan:  Symptomatic drink on antibiotics, dental follow-up and return precautions provided  CRITICAL CARE TIME:   0      Portions of the record may have been created with voice recognition software  Occasional wrong word or "sound a like" substitutions may have occurred due to the inherent limitations of voice recognition software       ED Provider  Electronically Signed by

## 2022-12-07 NOTE — ED PROVIDER NOTES
History  Chief Complaint   Patient presents with   • Facial Swelling     Left facial swelling for 2-3 days with pain     52-year-old female no previous pertinent past medical history presenting with 2 to 3 days of left sided cheek swelling and tenderness  Patient states she has had been having increasing pain especially in left lower jaw, makes it hard to chew or eat  No fevers but has had chills with some nausea  No diabetes or other reason to think she is immunocompromised  Is presenting here with worry that she could be forming a dental abscess though she has never had 1 of those in the past   Does have history of poor dentition ever since her pregnancies several years ago  Prior to Admission Medications   Prescriptions Last Dose Informant Patient Reported? Taking? albuterol (2 5 mg/3 mL) 0 083 % nebulizer solution   No Yes   Sig: Take 3 mL (2 5 mg total) by nebulization every 6 (six) hours as needed for wheezing or shortness of breath   albuterol (Ventolin HFA) 90 mcg/act inhaler   No Yes   Sig: Inhale 2 puffs every 4 (four) hours as needed for wheezing or shortness of breath   fluticasone-salmeterol (Advair HFA) 45-21 MCG/ACT inhaler   No Yes   Sig: Inhale 2 puffs 2 (two) times a day Rinse mouth after use        Facility-Administered Medications: None       Past Medical History:   Diagnosis Date   • Anxiety    • Asthma    • [facility] )    • Depression    • GERD (gastroesophageal reflux disease)    • Hypercholesterolemia        Past Surgical History:   Procedure Laterality Date   • CHOLECYSTECTOMY     • FL INJECTION RIGHT HIP (ARTHROGRAM)  11/23/2020   • FL INJECTION RIGHT HIP (NON ARTHROGRAM)  8/10/2020   • TONSILLECTOMY     • TUBAL LIGATION         Family History   Problem Relation Age of Onset   • Mental illness Mother    • Depression Mother    • Cancer Mother    • Hyperlipidemia Mother    • Asthma Father    • Arthritis Father    • Cancer Brother    • Psoriasis Daughter      I have reviewed and agree with the history as documented  E-Cigarette/Vaping   • E-Cigarette Use Never User      E-Cigarette/Vaping Substances   • Nicotine No    • THC No    • CBD No    • Flavoring No    • Other No    • Unknown No      Social History     Tobacco Use   • Smoking status: Every Day     Packs/day: 0 50     Types: Cigarettes   • Smokeless tobacco: Never   Vaping Use   • Vaping Use: Never used   Substance Use Topics   • Alcohol use: Not Currently   • Drug use: Not Currently        Review of Systems   Constitutional: Negative for activity change, fever and unexpected weight change  HENT: Positive for facial swelling  Negative for postnasal drip and rhinorrhea  Jaw pain   Eyes: Negative for visual disturbance  Respiratory: Negative for cough, chest tightness and shortness of breath  Cardiovascular: Negative for chest pain and palpitations  Gastrointestinal: Positive for nausea  Negative for abdominal pain, blood in stool, constipation, diarrhea and vomiting  Genitourinary: Negative for dysuria and hematuria  Skin: Negative for color change and wound  Allergic/Immunologic: Negative for immunocompromised state  Neurological: Negative for dizziness and syncope  Psychiatric/Behavioral: Negative for dysphoric mood  The patient is not nervous/anxious  All other systems reviewed and are negative  Physical Exam  ED Triage Vitals [12/07/22 0923]   Temperature Pulse Respirations Blood Pressure SpO2   97 8 °F (36 6 °C) 82 18 159/96 99 %      Temp Source Heart Rate Source Patient Position - Orthostatic VS BP Location FiO2 (%)   Temporal Monitor Sitting Right arm --      Pain Score       10 - Worst Possible Pain             Orthostatic Vital Signs  Vitals:    12/07/22 0923   BP: 159/96   Pulse: 82   Patient Position - Orthostatic VS: Sitting       Physical Exam  Vitals and nursing note reviewed  Exam conducted with a chaperone present ( at bedside)     Constitutional:       General: She is not in acute distress  Appearance: She is normal weight  She is ill-appearing  She is not toxic-appearing or diaphoretic  HENT:      Head: Normocephalic and atraumatic  Comments: Slightly more swollen on the left side of her face  Tender across the left mandible extending up to the mastoid  No erythema or swelling of the mastoid itself  Right Ear: External ear normal       Left Ear: Tympanic membrane, ear canal and external ear normal  There is no impacted cerumen  Nose: Nose normal       Mouth/Throat:      Mouth: Mucous membranes are moist       Comments: No obvious abscess or area of fluctuance amenable to drainage  Eyes:      General: No scleral icterus  Right eye: No discharge  Left eye: No discharge  Pupils: Pupils are equal, round, and reactive to light  Cardiovascular:      Rate and Rhythm: Normal rate  Pulses: Normal pulses  Pulmonary:      Effort: Pulmonary effort is normal  No respiratory distress  Breath sounds: No stridor  Abdominal:      General: Abdomen is flat  There is no distension  Hernia: No hernia is present  Musculoskeletal:         General: Normal range of motion  Cervical back: Normal range of motion  Skin:     General: Skin is warm and dry  Coloration: Skin is not jaundiced  Neurological:      General: No focal deficit present  Mental Status: She is alert  Mental status is at baseline     Psychiatric:         Mood and Affect: Mood normal          Behavior: Behavior normal          ED Medications  Medications   ketorolac (TORADOL) injection 30 mg (has no administration in time range)       Diagnostic Studies  Results Reviewed     None                 No orders to display         Procedures  Procedures      ED Course                                       MDM  Number of Diagnoses or Management Options  Dental infection: new and requires workup  Diagnosis management comments: Patient likely has a dental infection however no obvious abscess or area of fluctuance that would be amenable to drainage here in the ED  Although her mandible and mastoid is somewhat tender, no bogginess, swelling, severe tenderness of the mastoid and TM looks clear so doubt mastoiditis  Also does not have a fever here which again argues against mastoiditis infection  Since swelling is not bilateral, not Brandon angina  Overall, will discharge patient on short course of Augmentin and Toradol for pain and have her follow-up with [facility]      Disposition  Final diagnoses:   Dental infection     Time reflects when diagnosis was documented in both MDM as applicable and the Disposition within this note     Time User Action Codes Description Comment    12/7/2022  9:49 AM Missy Lindsay [K04 7] Dental infection       ED Disposition     ED Disposition   Discharge    Condition   Stable    Date/Time   Wed Dec 7, 2022  9:53 AM    Comment   Chandana Monzon discharge to home/self care  Follow-up Information     Follow up With Specialties Details Why [address] [place] [address] for Oral and Maxillofacial Surgery [place]  Schedule an appointment as soon as possible for a visit   [address] [place] [address]  [phone]          Patient's Medications   Discharge Prescriptions    AMOXICILLIN-CLAVULANATE (AUGMENTIN) 875-125 MG PER TABLET    Take 1 tablet by mouth every 12 (twelve) hours for 7 days       Start Date: 12/7/2022 End Date: 12/14/2022       Order Dose: 1 tablet       Quantity: 14 tablet    Refills: 0     No discharge procedures on file  PDMP Review     None           ED Provider  Attending physically available and evaluated Chandana Monzon I managed the patient along with the ED Attending      Electronically Signed by         Kimberly Romero MD  12/07/22 6841

## 2022-12-07 NOTE — Clinical Note
Rene Landeros was seen and treated in our emergency department on 12/7/2022  Diagnosis: Dental infection    Genevieve    She may return on this date: 12/09/2022    Rene Landeros was seen in the ED on 12/07/22  Please excuse her from work for today and tomorrow as she gets better       If you have any questions or concerns, please don't hesitate to call        Shonda Arshad MD    ______________________________           _______________          _______________  Hospital Representative                              Date                                Time

## 2022-12-10 ENCOUNTER — VBI (OUTPATIENT)
Dept: ADMINISTRATIVE | Facility: OTHER | Age: 37
End: 2022-12-10

## 2022-12-11 ENCOUNTER — HOSPITAL ENCOUNTER (EMERGENCY)
Facility: HOSPITAL | Age: 37
Discharge: HOME | End: 2022-12-11

## 2022-12-11 ENCOUNTER — APPOINTMENT (EMERGENCY)
Dept: CT IMAGING | Facility: HOSPITAL | Age: 37
End: 2022-12-11

## 2022-12-11 VITALS
SYSTOLIC BLOOD PRESSURE: 161 MMHG | BODY MASS INDEX: 43.32 KG/M2 | RESPIRATION RATE: 18 BRPM | HEART RATE: 95 BPM | TEMPERATURE: 97.2 F | HEIGHT: 65 IN | WEIGHT: 260 LBS | OXYGEN SATURATION: 99 % | DIASTOLIC BLOOD PRESSURE: 96 MMHG

## 2022-12-11 DIAGNOSIS — K04.7 DENTAL INFECTION: Primary | ICD-10-CM

## 2022-12-11 LAB
ANION GAP SERPL CALCULATED.3IONS-SCNC: 8 MMOL/L (ref 4–13)
BASOPHILS # BLD AUTO: 0.05 THOUSANDS/ÂΜL (ref 0–0.1)
BASOPHILS NFR BLD AUTO: 1 % (ref 0–1)
BUN SERPL-MCNC: 8 MG/DL (ref 5–25)
CALCIUM SERPL-MCNC: 8.8 MG/DL (ref 8.3–10.1)
CHLORIDE SERPL-SCNC: 106 MMOL/L (ref 96–108)
CO2 SERPL-SCNC: 27 MMOL/L (ref 21–32)
CREAT SERPL-MCNC: 0.6 MG/DL (ref 0.6–1.3)
EOSINOPHIL # BLD AUTO: 0.25 THOUSAND/ÂΜL (ref 0–0.61)
EOSINOPHIL NFR BLD AUTO: 3 % (ref 0–6)
ERYTHROCYTE [DISTWIDTH] IN BLOOD BY AUTOMATED COUNT: 13.2 % (ref 11.6–15.1)
EXT PREGNANCY TEST URINE: NEGATIVE
EXT. CONTROL: NORMAL
GFR SERPL CREATININE-BSD FRML MDRD: 117 ML/MIN/1.73SQ M
GLUCOSE SERPL-MCNC: 93 MG/DL (ref 65–140)
HCT VFR BLD AUTO: 44.9 % (ref 34.8–46.1)
HGB BLD-MCNC: 15.2 G/DL (ref 11.5–15.4)
IMM GRANULOCYTES # BLD AUTO: 0.05 THOUSAND/UL (ref 0–0.2)
IMM GRANULOCYTES NFR BLD AUTO: 1 % (ref 0–2)
LACTATE SERPL-SCNC: 1 MMOL/L (ref 0.5–2)
LYMPHOCYTES # BLD AUTO: 2.53 THOUSANDS/ÂΜL (ref 0.6–4.47)
LYMPHOCYTES NFR BLD AUTO: 26 % (ref 14–44)
MCH RBC QN AUTO: 28.8 PG (ref 26.8–34.3)
MCHC RBC AUTO-ENTMCNC: 33.9 G/DL (ref 31.4–37.4)
MCV RBC AUTO: 85 FL (ref 82–98)
MONOCYTES # BLD AUTO: 0.43 THOUSAND/ÂΜL (ref 0.17–1.22)
MONOCYTES NFR BLD AUTO: 4 % (ref 4–12)
NEUTROPHILS # BLD AUTO: 6.48 THOUSANDS/ÂΜL (ref 1.85–7.62)
NEUTS SEG NFR BLD AUTO: 65 % (ref 43–75)
NRBC BLD AUTO-RTO: 0 /100 WBCS
PLATELET # BLD AUTO: 337 THOUSANDS/UL (ref 149–390)
PMV BLD AUTO: 8.9 FL (ref 8.9–12.7)
POTASSIUM SERPL-SCNC: 4.1 MMOL/L (ref 3.5–5.3)
RBC # BLD AUTO: 5.27 MILLION/UL (ref 3.81–5.12)
SODIUM SERPL-SCNC: 141 MMOL/L (ref 135–147)
WBC # BLD AUTO: 9.79 THOUSAND/UL (ref 4.31–10.16)

## 2022-12-11 RX ORDER — CLINDAMYCIN PHOSPHATE 600 MG/50ML
600 INJECTION INTRAVENOUS ONCE
Status: COMPLETED | OUTPATIENT
Start: 2022-12-11 | End: 2022-12-11

## 2022-12-11 RX ORDER — NAPROXEN 500 MG/1
500 TABLET ORAL 2 TIMES DAILY WITH MEALS
Qty: 30 TABLET | Refills: 0 | Status: SHIPPED | OUTPATIENT
Start: 2022-12-11

## 2022-12-11 RX ORDER — CHLORHEXIDINE GLUCONATE 0.12 MG/ML
15 RINSE ORAL 2 TIMES DAILY
Qty: 120 ML | Refills: 0 | Status: SHIPPED | OUTPATIENT
Start: 2022-12-11

## 2022-12-11 RX ORDER — CLINDAMYCIN HYDROCHLORIDE 300 MG/1
300 CAPSULE ORAL 4 TIMES DAILY
Qty: 28 CAPSULE | Refills: 0 | Status: SHIPPED | OUTPATIENT
Start: 2022-12-11 | End: 2022-12-18

## 2022-12-11 RX ORDER — MORPHINE SULFATE 4 MG/ML
4 INJECTION, SOLUTION INTRAMUSCULAR; INTRAVENOUS ONCE
Status: COMPLETED | OUTPATIENT
Start: 2022-12-11 | End: 2022-12-11

## 2022-12-11 RX ORDER — KETOROLAC TROMETHAMINE 30 MG/ML
15 INJECTION, SOLUTION INTRAMUSCULAR; INTRAVENOUS ONCE
Status: COMPLETED | OUTPATIENT
Start: 2022-12-11 | End: 2022-12-11

## 2022-12-11 RX ADMIN — MORPHINE SULFATE 4 MG: 4 INJECTION, SOLUTION INTRAMUSCULAR; INTRAVENOUS at 20:26

## 2022-12-11 RX ADMIN — SODIUM CHLORIDE 1000 ML: 0.9 INJECTION, SOLUTION INTRAVENOUS at 20:29

## 2022-12-11 RX ADMIN — IOHEXOL 85 ML: 350 INJECTION, SOLUTION INTRAVENOUS at 21:10

## 2022-12-11 RX ADMIN — KETOROLAC TROMETHAMINE 15 MG: 30 INJECTION, SOLUTION INTRAMUSCULAR; INTRAVENOUS at 20:26

## 2022-12-11 RX ADMIN — CLINDAMYCIN PHOSPHATE 600 MG: 600 INJECTION, SOLUTION INTRAVENOUS at 20:28

## 2022-12-11 NOTE — Clinical Note
Imelda Perez was seen and treated in our emergency department on 12/11/2022  No restrictions        Dental infection    Diagnosis: Dental Infection    Chitra Beaulieu  may return to work on return date  She may return on this date: 12/13/2022         If you have any questions or concerns, please don't hesitate to call        Alcira Ram RN    ______________________________           _______________          _______________  Hospital Representative                              Date                                Time

## 2022-12-12 NOTE — ED PROVIDER NOTES
History  Chief Complaint   Patient presents with   • Dental Pain     Patient was seen here the other day for the same and placed on an antibiotic  Patient has 10/10 pain on the left lower side  Patient hasn't taken anything within the last 6 hours for pain  Patient presents to the emergency department today for evaluation of left-sided facial swelling left-sided facial pain as well as fever  This is a 60-year-old female who has been treated twice daily with Augmentin over the last 4 days without improvement  She feels that her left-sided facial swelling is worsening and the pain is worsening  Self treated with over-the-counter pain relievers  She did have a fever earlier on today  No significant history of submental or submandibular swelling  No right-sided facial swelling  Prior to Admission Medications   Prescriptions Last Dose Informant Patient Reported? Taking? albuterol (2 5 mg/3 mL) 0 083 % nebulizer solution   No No   Sig: Take 3 mL (2 5 mg total) by nebulization every 6 (six) hours as needed for wheezing or shortness of breath   albuterol (Ventolin HFA) 90 mcg/act inhaler   No No   Sig: Inhale 2 puffs every 4 (four) hours as needed for wheezing or shortness of breath   amoxicillin-clavulanate (AUGMENTIN) 875-125 mg per tablet   No No   Sig: Take 1 tablet by mouth every 12 (twelve) hours for 7 days   fluticasone-salmeterol (Advair HFA) 45-21 MCG/ACT inhaler   No No   Sig: Inhale 2 puffs 2 (two) times a day Rinse mouth after use        Facility-Administered Medications: None       Past Medical History:   Diagnosis Date   • Anxiety    • Asthma    • Bipolar 1 disorder ([facility] [address] )    • Depression    • GERD (gastroesophageal reflux disease)    • Hypercholesterolemia        Past Surgical History:   Procedure Laterality Date   • CHOLECYSTECTOMY     • FL INJECTION RIGHT HIP (ARTHROGRAM)  11/23/2020   • FL INJECTION RIGHT HIP (NON ARTHROGRAM)  8/10/2020   • TONSILLECTOMY     • TUBAL LIGATION Family History   Problem Relation Age of Onset   • Mental illness Mother    • Depression Mother    • Cancer Mother    • Hyperlipidemia Mother    • Asthma Father    • Arthritis Father    • Cancer Brother    • Psoriasis Daughter      I have reviewed and agree with the history as documented  E-Cigarette/Vaping   • E-Cigarette Use Never User      E-Cigarette/Vaping Substances   • Nicotine No    • THC No    • CBD No    • Flavoring No    • Other No    • Unknown No      Social History     Tobacco Use   • Smoking status: Every Day     Packs/day: 0 50     Types: Cigarettes   • Smokeless tobacco: Never   Vaping Use   • Vaping Use: Never used   Substance Use Topics   • Alcohol use: Not Currently   • Drug use: Not Currently       Review of Systems   Constitutional: Positive for fever  Negative for chills  HENT: Positive for dental problem and facial swelling  Negative for ear pain and sore throat  Eyes: Negative for pain and visual disturbance  Respiratory: Negative for cough and shortness of breath  Cardiovascular: Negative for chest pain and palpitations  Gastrointestinal: Negative for abdominal pain and vomiting  Genitourinary: Negative for dysuria and hematuria  Musculoskeletal: Negative for arthralgias and back pain  Skin: Negative for color change and rash  Neurological: Negative for seizures and syncope  All other systems reviewed and are negative  Physical Exam  Physical Exam  Vitals reviewed  Constitutional:       General: She is not in acute distress  Appearance: Normal appearance  She is not ill-appearing, toxic-appearing or diaphoretic  HENT:      Head: Normocephalic and atraumatic  Right Ear: External ear normal       Left Ear: External ear normal       Mouth/Throat:      Mouth: No angioedema  Dentition: Abnormal dentition  Gingival swelling present  Pharynx: No pharyngeal swelling or oropharyngeal exudate  Comments: Left-sided facial swelling noted  No identifiable drainable intraoral abscess   Eyes:      General: No scleral icterus  Right eye: No discharge  Left eye: No discharge  Extraocular Movements: Extraocular movements intact  Conjunctiva/sclera: Conjunctivae normal    Cardiovascular:      Rate and Rhythm: Normal rate  Pulses: Normal pulses  Pulmonary:      Effort: Pulmonary effort is normal  No respiratory distress  Breath sounds: No stridor  Musculoskeletal:         General: No deformity or signs of injury  Cervical back: Normal range of motion  No rigidity  Skin:     General: Skin is warm  Coloration: Skin is not jaundiced  Findings: No lesion or rash  Neurological:      General: No focal deficit present  Mental Status: She is alert and oriented to person, place, and time  Mental status is at baseline  Gait: Gait normal    Psychiatric:         Mood and Affect: Mood normal          Thought Content:  Thought content normal          Judgment: Judgment normal          Vital Signs  ED Triage Vitals [12/11/22 2003]   Temperature Pulse Respirations Blood Pressure SpO2   (!) 97 2 °F (36 2 °C) 95 18 161/96 99 %      Temp Source Heart Rate Source Patient Position - Orthostatic VS BP Location FiO2 (%)   Temporal Monitor Sitting Right arm --      Pain Score       10 - Worst Possible Pain           Vitals:    12/11/22 2003   BP: 161/96   Pulse: 95   Patient Position - Orthostatic VS: Sitting         Visual Acuity      ED Medications  Medications   sodium chloride 0 9 % bolus 1,000 mL (1,000 mL Intravenous New Bag 12/11/22 2029)   clindamycin (CLEOCIN) IVPB (premix in dextrose) 600 mg 50 mL (600 mg Intravenous New Bag 12/11/22 2028)   ketorolac (TORADOL) injection 15 mg (15 mg Intravenous Given 12/11/22 2026)   morphine injection 4 mg (4 mg Intravenous Given 12/11/22 2026)   iohexol (OMNIPAQUE) 350 MG/ML injection (SINGLE-DOSE) 85 mL (85 mL Intravenous Given 12/11/22 2110)       Diagnostic Studies  Results Reviewed     Procedure Component Value Units Date/Time    Lactic acid [[id number]]  (Normal) Collected: 12/11/22 2025    Lab Status: Final result Specimen: Blood from Arm, Left Updated: 12/11/22 2049     LACTIC ACID 1 0 mmol/L     Narrative:      Result may be elevated if tourniquet was used during collection      Basic metabolic panel [[id number]] Collected: 12/11/22 2025    Lab Status: Final result Specimen: Blood from Arm, Left Updated: 12/11/22 2040     Sodium 141 mmol/L      Potassium 4 1 mmol/L      Chloride 106 mmol/L      CO2 27 mmol/L      ANION GAP 8 mmol/L      BUN 8 mg/dL      Creatinine 0 60 mg/dL      Glucose 93 mg/dL      Calcium 8 8 mg/dL      eGFR 117 ml/min/1 73sq m     Narrative:      [place] guidelines for Chronic Kidney Disease (CKD):   •  Stage 1 with normal or high GFR (GFR > 90 mL/min/1 73 square meters)  •  Stage 2 Mild CKD (GFR = 60-89 mL/min/1 73 square meters)  •  Stage 3A Moderate CKD (GFR = 45-59 mL/min/1 73 square meters)  •  Stage 3B Moderate CKD (GFR = 30-44 mL/min/1 73 square meters)  •  Stage 4 Severe CKD (GFR = 15-29 mL/min/1 73 square meters)  •  Stage 5 End Stage CKD (GFR <15 mL/min/1 73 square meters)  Note: GFR calculation is accurate only with a steady state creatinine    CBC and differential [[id number]]  (Abnormal) Collected: 12/11/22 2025    Lab Status: Final result Specimen: Blood from Arm, Left Updated: 12/11/22 2032     WBC 9 79 Thousand/uL      RBC 5 27 Million/uL      Hemoglobin 15 2 g/dL      Hematocrit 44 9 %      MCV 85 fL      MCH 28 8 pg      MCHC 33 9 g/dL      RDW 13 2 %      MPV 8 9 fL      Platelets 886 Thousands/uL      nRBC 0 /100 WBCs      Neutrophils Relative 65 %      Immat GRANS % 1 %      Lymphocytes Relative 26 %      Monocytes Relative 4 %      Eosinophils Relative 3 %      Basophils Relative 1 %      Neutrophils Absolute 6 48 Thousands/µL      Immature Grans Absolute 0 05 Thousand/uL      Lymphocytes Absolute 2 53 Thousands/µL      Monocytes Absolute 0 43 Thousand/µL      Eosinophils Absolute 0 25 Thousand/µL      Basophils Absolute 0 05 Thousands/µL     POCT pregnancy, urine [[id number]]  (Normal) Resulted: 12/11/22 2030    Lab Status: Final result Updated: 12/11/22 2030     EXT Preg Test, Ur Negative     Control Valid                 CT facial bones with contrast   Final Result by Araceli Yu MD (12/11 2120)      No evidence of abscess  No evidence of acute inflammatory process within the face  Workstation performed: [id number]                    Procedures  Procedures         ED Course  ED Course as of 12/11/22 2127   [place] Dec 11, 2022   2020 SpO2: 99 %   2020 Respirations: 18   2020 Pulse: 95   2020 Temperature(!): 97 2 °F (36 2 °C)   2020 Blood Pressure: 161/96   2034 PREGNANCY TEST URINE: Negative   2034 WBC: 9 79   2034 Hemoglobin: 15 2   2034 MPV: 8 9   2034 Platelet Count: 256   9599 eGFR: 117   2041 Sodium: 141   2124 IMPRESSION:     No evidence of abscess  No evidence of acute inflammatory process within the face  SBIRT 22yo+    Flowsheet Row Most Recent Value   SBIRT (23 yo +)    In order to provide better care to our patients, we are screening all of our patients for alcohol and drug use  Would it be okay to ask you these screening questions? No Filed at: 12/11/2022 2010                    MDM    Disposition  Final diagnoses:   Dental infection     Time reflects when diagnosis was documented in both MDM as applicable and the Disposition within this note     Time User Action Codes Description Comment    12/11/2022  9:25 PM Tiffanie Maier Add [K04 7] Dental infection       ED Disposition     ED Disposition   Discharge    Condition   Stable    Date/Time   Sun Dec 11, 2022  9:25 PM    Comment   Flasher Cord discharge to home/self care                 Follow-up Information     Follow up With Specialties Details Why Contact Info    Dentist  Schedule an appointment as soon as possible for a visit             Patient's Medications   Discharge Prescriptions    CHLORHEXIDINE (PERIDEX) 0 12 % SOLUTION    Apply 15 mL to the mouth or throat 2 (two) times a day       Start Date: 12/11/2022End Date: --       Order Dose: 15 mL       Quantity: 120 mL    Refills: 0    CLINDAMYCIN (CLEOCIN) 300 MG CAPSULE    Take 1 capsule (300 mg total) by mouth 4 (four) times a day for 7 days       Start Date: 12/11/2022End Date: 12/18/2022       Order Dose: 300 mg       Quantity: 28 capsule    Refills: 0    NAPROXEN (NAPROSYN) 500 MG TABLET    Take 1 tablet (500 mg total) by mouth 2 (two) times a day with meals       Start Date: 12/11/2022End Date: --       Order Dose: 500 mg       Quantity: 30 tablet    Refills: 0       No discharge procedures on file      PDMP Review     None          ED Provider  Electronically Signed by           Radha Ho PA-C  12/11/22 2760

## 2023-01-08 ENCOUNTER — OFFICE VISIT (OUTPATIENT)
Dept: URGENT CARE | Facility: MEDICAL CENTER | Age: 38
End: 2023-01-08

## 2023-01-08 VITALS
DIASTOLIC BLOOD PRESSURE: 88 MMHG | HEIGHT: 65 IN | WEIGHT: 293 LBS | BODY MASS INDEX: 48.82 KG/M2 | HEART RATE: 84 BPM | OXYGEN SATURATION: 98 % | TEMPERATURE: 97.3 F | SYSTOLIC BLOOD PRESSURE: 134 MMHG | RESPIRATION RATE: 20 BRPM

## 2023-01-08 DIAGNOSIS — B34.9 VIRAL ILLNESS: Primary | ICD-10-CM

## 2023-01-08 DIAGNOSIS — R05.1 ACUTE COUGH: ICD-10-CM

## 2023-01-08 LAB
SARS-COV-2 AG UPPER RESP QL IA: NEGATIVE
VALID CONTROL: NORMAL

## 2023-01-08 RX ORDER — ONDANSETRON 4 MG/1
4 TABLET, ORALLY DISINTEGRATING ORAL EVERY 6 HOURS PRN
Qty: 15 TABLET | Refills: 0 | Status: SHIPPED | OUTPATIENT
Start: 2023-01-08

## 2023-01-08 NOTE — PATIENT INSTRUCTIONS
Fluids   Rest   Take Zofran as needed for nausea and vomiting   Tylenol or Motrin as needed for fever or pain   If symptoms worsen have yourself rechecked

## 2023-01-08 NOTE — PROGRESS NOTES
[facility]        NAME: Felicia Perez is a 40 y o  female  : 1985    MRN: [id number]  DATE: 2023  TIME: 8:58 AM    Assessment and Plan   Viral illness [B34 9]  1  Viral illness  ondansetron (ZOFRAN-ODT) 4 mg disintegrating tablet      2  Acute cough  Poct Covid 19 Rapid Antigen Test            Patient Instructions       Follow up with PCP in 3-5 days  Proceed to  ER if symptoms worsen  Chief Complaint     Chief Complaint   Patient presents with   • Vomiting     Right ear pain  Started 2 days ago  Had fever yesterday  Fatigued         History of Present Illness         Patient presents with a 2 day history of fever right ear pain fatigue vomiting diarrhea body aches and headaches  No known exposure to COVID or flu  Review of Systems   Review of Systems   Constitutional: Positive for fatigue and fever  HENT: Positive for congestion  Negative for rhinorrhea and sore throat  Respiratory: Positive for cough  Gastrointestinal: Positive for diarrhea and vomiting  Negative for nausea  Musculoskeletal: Positive for myalgias  Skin: Negative for rash  Neurological: Positive for headaches           Current Medications       Current Outpatient Medications:   •  albuterol (2 5 mg/3 mL) 0 083 % nebulizer solution, Take 3 mL (2 5 mg total) by nebulization every 6 (six) hours as needed for wheezing or shortness of breath, Disp: 3 mL, Rfl: 1  •  albuterol (Ventolin HFA) 90 mcg/act inhaler, Inhale 2 puffs every 4 (four) hours as needed for wheezing or shortness of breath, Disp: 18 g, Rfl: 5  •  chlorhexidine (PERIDEX) 0 12 % solution, Apply 15 mL to the mouth or throat 2 (two) times a day, Disp: 120 mL, Rfl: 0  •  ondansetron (ZOFRAN-ODT) 4 mg disintegrating tablet, Take 1 tablet (4 mg total) by mouth every 6 (six) hours as needed for nausea or vomiting, Disp: 15 tablet, Rfl: 0  •  fluticasone-salmeterol (Advair HFA) 45-21 MCG/ACT inhaler, Inhale 2 puffs 2 (two) times a day Rinse mouth after use  (Patient not taking: Reported on 1/8/2023), Disp: 12 g, Rfl: 1  •  naproxen (Naprosyn) 500 mg tablet, Take 1 tablet (500 mg total) by mouth 2 (two) times a day with meals (Patient not taking: Reported on 1/8/2023), Disp: 30 tablet, Rfl: 0    Current Allergies     Allergies as of 01/08/2023 - Reviewed 01/08/2023   Allergen Reaction Noted   • Medical tape Rash 08/19/2013            The following portions of the patient's history were reviewed and updated as appropriate: allergies, current medications, past family history, past medical history, past social history, past surgical history and problem list      Past Medical History:   Diagnosis Date   • Anxiety    • Asthma    • Bipolar 1 disorder ([facility] [address] )    • Depression    • GERD (gastroesophageal reflux disease)    • Hypercholesterolemia        Past Surgical History:   Procedure Laterality Date   • CHOLECYSTECTOMY     • FL INJECTION RIGHT HIP (ARTHROGRAM)  11/23/2020   • FL INJECTION RIGHT HIP (NON ARTHROGRAM)  8/10/2020   • TONSILLECTOMY     • TUBAL LIGATION         Family History   Problem Relation Age of Onset   • Mental illness Mother    • Depression Mother    • Cancer Mother    • Hyperlipidemia Mother    • Asthma Father    • Arthritis Father    • Cancer Brother    • Psoriasis Daughter          Medications have been verified  Objective   /88   Pulse 84   Temp (!) 97 3 °F (36 3 °C)   Resp 20   Ht 5' 5" (1 651 m)   Wt 136 kg (299 lb)   SpO2 98%   BMI 49 76 kg/m²   No LMP recorded  Physical Exam     Physical Exam  Vitals and nursing note reviewed  Constitutional:       Appearance: Normal appearance  HENT:      Head: Normocephalic and atraumatic  Right Ear: Tympanic membrane normal       Left Ear: Tympanic membrane normal       Mouth/Throat:      Mouth: Mucous membranes are moist       Pharynx: Oropharynx is clear     Eyes:      Conjunctiva/sclera: Conjunctivae normal    Cardiovascular:      Rate and Rhythm: Normal rate and regular rhythm  Heart sounds: Normal heart sounds  Pulmonary:      Effort: Pulmonary effort is normal       Breath sounds: Normal breath sounds  Skin:     General: Skin is warm  Neurological:      Mental Status: She is alert

## 2023-01-13 ENCOUNTER — OFFICE VISIT (OUTPATIENT)
Dept: FAMILY MEDICINE CLINIC | Facility: CLINIC | Age: 38
End: 2023-01-13

## 2023-01-13 ENCOUNTER — DOCUMENTATION (OUTPATIENT)
Dept: FAMILY MEDICINE CLINIC | Facility: CLINIC | Age: 38
End: 2023-01-13

## 2023-01-13 VITALS
RESPIRATION RATE: 18 BRPM | OXYGEN SATURATION: 97 % | HEIGHT: 65 IN | TEMPERATURE: 97.8 F | WEIGHT: 293 LBS | HEART RATE: 92 BPM | BODY MASS INDEX: 48.82 KG/M2 | SYSTOLIC BLOOD PRESSURE: 128 MMHG | DIASTOLIC BLOOD PRESSURE: 82 MMHG

## 2023-01-13 DIAGNOSIS — G43.101 MIGRAINE WITH AURA AND WITH STATUS MIGRAINOSUS, NOT INTRACTABLE: ICD-10-CM

## 2023-01-13 DIAGNOSIS — Z00.00 ANNUAL PHYSICAL EXAM: Primary | ICD-10-CM

## 2023-01-13 DIAGNOSIS — Z71.6 ENCOUNTER FOR SMOKING CESSATION COUNSELING: ICD-10-CM

## 2023-01-13 DIAGNOSIS — K21.9 GASTROESOPHAGEAL REFLUX DISEASE, UNSPECIFIED WHETHER ESOPHAGITIS PRESENT: ICD-10-CM

## 2023-01-13 DIAGNOSIS — Z23 NEED FOR VACCINATION: ICD-10-CM

## 2023-01-13 DIAGNOSIS — K04.7 DENTAL INFECTION: ICD-10-CM

## 2023-01-13 PROBLEM — E78.5 DYSLIPIDEMIA, GOAL LDL BELOW 100: Status: ACTIVE | Noted: 2018-02-23

## 2023-01-13 PROBLEM — K52.9 GASTROENTERITIS: Status: ACTIVE | Noted: 2019-03-23

## 2023-01-13 RX ORDER — PANTOPRAZOLE SODIUM 40 MG/1
40 TABLET, DELAYED RELEASE ORAL DAILY
Qty: 56 TABLET | Refills: 0 | Status: SHIPPED | OUTPATIENT
Start: 2023-01-13 | End: 2023-03-10

## 2023-01-13 RX ORDER — SUMATRIPTAN 25 MG/1
25 TABLET, FILM COATED ORAL ONCE AS NEEDED
Qty: 10 TABLET | Refills: 0 | Status: SHIPPED | OUTPATIENT
Start: 2023-01-13

## 2023-01-13 RX ORDER — BUPROPION HYDROCHLORIDE 150 MG/1
150 TABLET, EXTENDED RELEASE ORAL 2 TIMES DAILY
Qty: 180 TABLET | Refills: 1 | Status: SHIPPED | OUTPATIENT
Start: 2023-01-13

## 2023-01-13 RX ORDER — CHLORHEXIDINE GLUCONATE 0.12 MG/ML
15 RINSE ORAL 2 TIMES DAILY
Qty: 120 ML | Refills: 0 | Status: SHIPPED | OUTPATIENT
Start: 2023-01-13

## 2023-01-13 RX ORDER — PROPRANOLOL HYDROCHLORIDE 80 MG/1
80 CAPSULE, EXTENDED RELEASE ORAL DAILY
Qty: 30 CAPSULE | Refills: 1 | Status: SHIPPED | OUTPATIENT
Start: 2023-01-13

## 2023-01-13 RX ORDER — AMOXICILLIN 500 MG/1
500 CAPSULE ORAL EVERY 8 HOURS SCHEDULED
Qty: 21 CAPSULE | Refills: 0 | Status: SHIPPED | OUTPATIENT
Start: 2023-01-13 | End: 2023-01-20

## 2023-01-13 RX ORDER — IBUPROFEN 600 MG/1
600 TABLET ORAL EVERY 6 HOURS PRN
COMMUNITY
Start: 2022-12-23 | End: 2023-12-23

## 2023-01-13 NOTE — PATIENT INSTRUCTIONS

## 2023-01-13 NOTE — PROGRESS NOTES
[address]    NAME: Nikki Villar  AGE: 40 y o  SEX: female  : 1985     DATE: 2023     Assessment and Plan:     Problem List Items Addressed This Visit        Digestive    Dental infection    Relevant Medications    amoxicillin (AMOXIL) 500 mg capsule    chlorhexidine (PERIDEX) 0 12 % solution    Gastroesophageal reflux disease    Relevant Medications    pantoprazole (PROTONIX) 40 mg tablet       Cardiovascular and Mediastinum    Migraine with aura and with status migrainosus, not intractable    Relevant Medications    ibuprofen (MOTRIN) 600 mg tablet    buPROPion (Wellbutrin SR) 150 mg 12 hr tablet    propranolol (INDERAL LA) 80 mg 24 hr capsule    SUMAtriptan (Imitrex) 25 mg tablet   Other Visit Diagnoses     Annual physical exam    -  Primary    Need for vaccination        Relevant Orders    Hepatitis B vaccine adult IM    influenza vaccine, quadrivalent, 0 5 mL, preservative-free, for adult and pediatric patients 6 mos+ (AFLURIA, FLUARIX, FLULAVAL, FLUZONE)    Encounter for smoking cessation counseling        Relevant Medications    buPROPion (Wellbutrin SR) 150 mg 12 hr tablet          Immunizations and preventive care screenings were discussed with patient today  Appropriate education was printed on patient's after visit summary  Counseling:  Alcohol/drug use: discussed moderation in alcohol intake, the recommendations for healthy alcohol use, and avoidance of illicit drug use  Dental Health: discussed importance of regular tooth brushing, flossing, and dental visits  Injury prevention: discussed safety/seat belts, safety helmets, smoke detectors, carbon dioxide detectors, and smoking near bedding or upholstery  Sexual health: discussed sexually transmitted diseases, partner selection, use of condoms, avoidance of unintended pregnancy, and contraceptive alternatives    · Exercise: the importance of regular exercise/physical activity was discussed  Recommend exercise 3-5 times per week for at least 30 minutes  BMI Counseling: Body mass index is 48 76 kg/m²  The BMI is above normal  Nutrition recommendations include decreasing portion sizes, encouraging healthy choices of fruits and vegetables, decreasing fast food intake, consuming healthier snacks, limiting drinks that contain sugar, moderation in carbohydrate intake, increasing intake of lean protein, reducing intake of saturated and trans fat and reducing intake of cholesterol  Exercise recommendations include exercising 3-5 times per week  No pharmacotherapy was ordered  Rationale for BMI follow-up plan is due to patient being overweight or obese  Tobacco Cessation Counseling: Tobacco cessation counseling was provided  The patient is sincerely urged to quit consumption of tobacco  She is ready to quit tobacco  Medication options and side effects of medication discussed  Bupropion SR was prescribed  Return in about 4 weeks (around 2/10/2023) for Recheck PAP; 2 months migraine and lab review  Chief Complaint:     Chief Complaint   Patient presents with   • Annual Exam     Discuss vomiting for last week- has been taking meds for dental abscess ibuprofen and exedrin migraine  Goes Monday for dental extraction      History of Present Illness:     Adult Annual Physical   Patient here for a comprehensive physical exam  The patient reports problems - tooth pain  Diet and Physical Activity  · Diet/Nutrition: well balanced diet, limited junk food and consuming 3-5 servings of fruits/vegetables daily  · Exercise: walking, 5-7 times a week on average and 30-60 minutes on average  Depression Screening  PHQ-2/9 Depression Screening         General Health  · Sleep: gets 4-6 hours of sleep on average  · Hearing: normal - bilateral   · Vision: no vision problems, most recent eye exam >1 year ago and wears glasses     · Dental: no dental visits for >1 year and does not brush teeth regularly  /GYN Health  · Last menstrual period: January 6, 2022  · Contraceptive method: tubal   · History of STDs?: no      Review of Systems:     Review of Systems   Constitutional: Negative for activity change, appetite change, fatigue and unexpected weight change  HENT: Positive for dental problem  Negative for congestion, ear pain, hearing loss, nosebleeds, rhinorrhea, sinus pain and trouble swallowing  Eyes: Negative for photophobia  Respiratory: Negative for cough, shortness of breath and wheezing  Cardiovascular: Negative for chest pain, palpitations and leg swelling  Gastrointestinal: Positive for abdominal pain (with heartburn since taking ibuprofen frequently)  Negative for anal bleeding, blood in stool, constipation, diarrhea and nausea  Endocrine: Negative for polydipsia, polyphagia and polyuria  Genitourinary: Negative for difficulty urinating, dysuria, frequency, hematuria and urgency  Musculoskeletal: Negative for arthralgias, back pain and myalgias  Skin: Negative for color change and rash  Neurological: Negative for dizziness, tremors, syncope, weakness and headaches  Psychiatric/Behavioral: Negative for agitation, confusion, self-injury and suicidal ideas        Past Medical History:     Past Medical History:   Diagnosis Date   • Anxiety    • Asthma    • Bipolar 1 disorder ([facility] [address] )    • Depression    • GERD (gastroesophageal reflux disease)    • Hypercholesterolemia       Past Surgical History:     Past Surgical History:   Procedure Laterality Date   • CHOLECYSTECTOMY     • FL INJECTION RIGHT HIP (ARTHROGRAM)  11/23/2020   • FL INJECTION RIGHT HIP (NON ARTHROGRAM)  8/10/2020   • TONSILLECTOMY     • TUBAL LIGATION        Social History:     Social History     Socioeconomic History   • Marital status: Single     Spouse name: None   • Number of children: None   • Years of education: None   • Highest education level: None Occupational History   • None   Tobacco Use   • Smoking status: Every Day     Packs/day: 0 50     Types: Cigarettes   • Smokeless tobacco: Never   Vaping Use   • Vaping Use: Every day   • Substances: THC   Substance and Sexual Activity   • Alcohol use: Not Currently   • Drug use: Not Currently   • Sexual activity: Yes     Partners: Male   Other Topics Concern   • None   Social History Narrative   • None     Social Determinants of Health     Financial Resource Strain: Not on file   Food Insecurity: Not on file   Transportation Needs: Not on file   Physical Activity: Not on file   Stress: Not on file   Social Connections: Not on file   Intimate Partner Violence: Not on file   Housing Stability: Not on file      Family History:     Family History   Problem Relation Age of Onset   • Mental illness Mother    • Depression Mother    • Cancer Mother    • Hyperlipidemia Mother    • Asthma Father    • Arthritis Father    • Cancer Brother    • Psoriasis Daughter       Current Medications:     Current Outpatient Medications   Medication Sig Dispense Refill   • albuterol (2 5 mg/3 mL) 0 083 % nebulizer solution Take 3 mL (2 5 mg total) by nebulization every 6 (six) hours as needed for wheezing or shortness of breath 3 mL 1   • albuterol (Ventolin HFA) 90 mcg/act inhaler Inhale 2 puffs every 4 (four) hours as needed for wheezing or shortness of breath 18 g 5   • amoxicillin (AMOXIL) 500 mg capsule Take 1 capsule (500 mg total) by mouth every 8 (eight) hours for 7 days 21 capsule 0   • buPROPion (Wellbutrin SR) 150 mg 12 hr tablet Take 1 tablet (150 mg total) by mouth 2 (two) times a day 180 tablet 1   • chlorhexidine (PERIDEX) 0 12 % solution Apply 15 mL to the mouth or throat 2 (two) times a day 120 mL 0   • ibuprofen (MOTRIN) 600 mg tablet Take 600 mg by mouth every 6 (six) hours as needed     • ondansetron (ZOFRAN-ODT) 4 mg disintegrating tablet Take 1 tablet (4 mg total) by mouth every 6 (six) hours as needed for nausea or vomiting 15 tablet 0   • pantoprazole (PROTONIX) 40 mg tablet Take 1 tablet (40 mg total) by mouth daily On an empty stomach with water - 30 minutes prior to eating or drinking anything else 56 tablet 0   • propranolol (INDERAL LA) 80 mg 24 hr capsule Take 1 capsule (80 mg total) by mouth daily 30 capsule 1   • SUMAtriptan (Imitrex) 25 mg tablet Take 1 tablet (25 mg total) by mouth once as needed for migraine 10 tablet 0     No current facility-administered medications for this visit  Allergies: Allergies   Allergen Reactions   • Medical Tape Rash     Rips skin off      Physical Exam:     /82 (BP Location: Left arm, Patient Position: Sitting, Cuff Size: Large)   Pulse 92   Temp 97 8 °F (36 6 °C) (Tympanic)   Resp 18   Ht 5' 5" (1 651 m)   Wt 133 kg (293 lb)   SpO2 97%   BMI 48 76 kg/m²     Physical Exam  Vitals and nursing note reviewed  Constitutional:       General: She is not in acute distress  Appearance: She is well-developed  HENT:      Head: Normocephalic and atraumatic  Nose: Nose normal       Mouth/Throat:      Mouth: Mucous membranes are moist       Dentition: Abnormal dentition  Pharynx: Oropharynx is clear  Eyes:      Conjunctiva/sclera: Conjunctivae normal    Cardiovascular:      Rate and Rhythm: Normal rate and regular rhythm  Heart sounds: No murmur heard  Pulmonary:      Effort: Pulmonary effort is normal  No respiratory distress  Breath sounds: Normal breath sounds  Abdominal:      Palpations: Abdomen is soft  There is no mass  Tenderness: There is abdominal tenderness  There is no guarding  Genitourinary:     Comments: Exam deferred  Musculoskeletal:         General: No swelling  Cervical back: Neck supple  Skin:     General: Skin is warm and dry  Capillary Refill: Capillary refill takes less than 2 seconds  Neurological:      Mental Status: She is alert and oriented to person, place, and time     Psychiatric:         Mood and Affect: Mood normal           Merced Gallagher, [id number] [facility]

## 2023-03-05 ENCOUNTER — OFFICE VISIT (OUTPATIENT)
Dept: URGENT CARE | Facility: MEDICAL CENTER | Age: 38
End: 2023-03-05

## 2023-03-05 ENCOUNTER — HOSPITAL ENCOUNTER (EMERGENCY)
Facility: HOSPITAL | Age: 38
Discharge: HOME | End: 2023-03-05
Attending: EMERGENCY MEDICINE | Admitting: EMERGENCY MEDICINE

## 2023-03-05 VITALS
TEMPERATURE: 97.9 F | BODY MASS INDEX: 42.68 KG/M2 | HEART RATE: 88 BPM | DIASTOLIC BLOOD PRESSURE: 92 MMHG | HEIGHT: 64 IN | OXYGEN SATURATION: 98 % | RESPIRATION RATE: 20 BRPM | WEIGHT: 250 LBS | SYSTOLIC BLOOD PRESSURE: 138 MMHG

## 2023-03-05 VITALS
WEIGHT: 249.12 LBS | OXYGEN SATURATION: 98 % | DIASTOLIC BLOOD PRESSURE: 82 MMHG | SYSTOLIC BLOOD PRESSURE: 171 MMHG | RESPIRATION RATE: 20 BRPM | HEART RATE: 97 BPM | BODY MASS INDEX: 42.76 KG/M2 | TEMPERATURE: 97.1 F

## 2023-03-05 DIAGNOSIS — R10.9 ABDOMINAL PAIN, UNSPECIFIED ABDOMINAL LOCATION: Primary | ICD-10-CM

## 2023-03-05 DIAGNOSIS — R11.2 NAUSEA AND VOMITING: ICD-10-CM

## 2023-03-05 DIAGNOSIS — R19.7 DIARRHEA: ICD-10-CM

## 2023-03-05 DIAGNOSIS — E87.6 HYPOKALEMIA: ICD-10-CM

## 2023-03-05 DIAGNOSIS — R10.9 ACUTE ABDOMINAL PAIN: Primary | ICD-10-CM

## 2023-03-05 DIAGNOSIS — R03.0 ELEVATED BLOOD PRESSURE READING: ICD-10-CM

## 2023-03-05 DIAGNOSIS — R51.9 ACUTE HEADACHE: ICD-10-CM

## 2023-03-05 LAB
ALBUMIN SERPL BCP-MCNC: 4.1 G/DL (ref 3.5–5)
ALP SERPL-CCNC: 61 U/L (ref 34–104)
ALT SERPL W P-5'-P-CCNC: 23 U/L (ref 7–52)
ANION GAP SERPL CALCULATED.3IONS-SCNC: 9 MMOL/L (ref 4–13)
AST SERPL W P-5'-P-CCNC: 18 U/L (ref 13–39)
BASOPHILS # BLD AUTO: 0.04 THOUSANDS/ÂΜL (ref 0–0.1)
BASOPHILS NFR BLD AUTO: 0 % (ref 0–1)
BILIRUB SERPL-MCNC: 0.39 MG/DL (ref 0.2–1)
BILIRUB UR QL STRIP: NEGATIVE
BUN SERPL-MCNC: 4 MG/DL (ref 5–25)
CALCIUM SERPL-MCNC: 9.3 MG/DL (ref 8.4–10.2)
CHLORIDE SERPL-SCNC: 104 MMOL/L (ref 96–108)
CLARITY UR: NORMAL
CO2 SERPL-SCNC: 25 MMOL/L (ref 21–32)
COLOR UR: YELLOW
CREAT SERPL-MCNC: 0.56 MG/DL (ref 0.6–1.3)
EOSINOPHIL # BLD AUTO: 0.26 THOUSAND/ÂΜL (ref 0–0.61)
EOSINOPHIL NFR BLD AUTO: 3 % (ref 0–6)
ERYTHROCYTE [DISTWIDTH] IN BLOOD BY AUTOMATED COUNT: 13.2 % (ref 11.6–15.1)
EXT PREGNANCY TEST URINE: NEGATIVE
EXT. CONTROL: NORMAL
FLUAV RNA RESP QL NAA+PROBE: NEGATIVE
FLUBV RNA RESP QL NAA+PROBE: NEGATIVE
GFR SERPL CREATININE-BSD FRML MDRD: 120 ML/MIN/1.73SQ M
GLUCOSE SERPL-MCNC: 110 MG/DL (ref 65–140)
GLUCOSE UR STRIP-MCNC: NEGATIVE MG/DL
HCT VFR BLD AUTO: 45.1 % (ref 34.8–46.1)
HGB BLD-MCNC: 15.1 G/DL (ref 11.5–15.4)
HGB UR QL STRIP.AUTO: NEGATIVE
IMM GRANULOCYTES # BLD AUTO: 0.08 THOUSAND/UL (ref 0–0.2)
IMM GRANULOCYTES NFR BLD AUTO: 1 % (ref 0–2)
KETONES UR STRIP-MCNC: NEGATIVE MG/DL
LEUKOCYTE ESTERASE UR QL STRIP: NEGATIVE
LIPASE SERPL-CCNC: 27 U/L (ref 11–82)
LYMPHOCYTES # BLD AUTO: 2.79 THOUSANDS/ÂΜL (ref 0.6–4.47)
LYMPHOCYTES NFR BLD AUTO: 28 % (ref 14–44)
MCH RBC QN AUTO: 29.2 PG (ref 26.8–34.3)
MCHC RBC AUTO-ENTMCNC: 33.5 G/DL (ref 31.4–37.4)
MCV RBC AUTO: 87 FL (ref 82–98)
MONOCYTES # BLD AUTO: 0.43 THOUSAND/ÂΜL (ref 0.17–1.22)
MONOCYTES NFR BLD AUTO: 4 % (ref 4–12)
NEUTROPHILS # BLD AUTO: 6.54 THOUSANDS/ÂΜL (ref 1.85–7.62)
NEUTS SEG NFR BLD AUTO: 64 % (ref 43–75)
NITRITE UR QL STRIP: NEGATIVE
NRBC BLD AUTO-RTO: 0 /100 WBCS
PH UR STRIP.AUTO: 6 [PH]
PLATELET # BLD AUTO: 298 THOUSANDS/UL (ref 149–390)
PMV BLD AUTO: 9.3 FL (ref 8.9–12.7)
POTASSIUM SERPL-SCNC: 3.4 MMOL/L (ref 3.5–5.3)
PROT SERPL-MCNC: 7.2 G/DL (ref 6.4–8.4)
PROT UR STRIP-MCNC: NEGATIVE MG/DL
RBC # BLD AUTO: 5.17 MILLION/UL (ref 3.81–5.12)
RSV RNA RESP QL NAA+PROBE: NEGATIVE
SARS-COV-2 RNA RESP QL NAA+PROBE: NEGATIVE
SL AMB  POCT GLUCOSE, UA: NEGATIVE
SL AMB LEUKOCYTE ESTERASE,UA: NEGATIVE
SL AMB POCT BILIRUBIN,UA: NEGATIVE
SL AMB POCT BLOOD,UA: NEGATIVE
SL AMB POCT CLARITY,UA: CLEAR
SL AMB POCT COLOR,UA: NORMAL
SL AMB POCT KETONES,UA: NEGATIVE
SL AMB POCT NITRITE,UA: NEGATIVE
SL AMB POCT PH,UA: 6
SL AMB POCT SPECIFIC GRAVITY,UA: 1.03
SL AMB POCT URINE HCG: NEGATIVE
SL AMB POCT URINE PROTEIN: NEGATIVE
SL AMB POCT UROBILINOGEN: 0.2
SODIUM SERPL-SCNC: 138 MMOL/L (ref 135–147)
SP GR UR STRIP.AUTO: 1.02 (ref 1–1.03)
UROBILINOGEN UR QL STRIP.AUTO: 0.2 E.U./DL
WBC # BLD AUTO: 10.14 THOUSAND/UL (ref 4.31–10.16)

## 2023-03-05 RX ORDER — KETOROLAC TROMETHAMINE 30 MG/ML
15 INJECTION, SOLUTION INTRAMUSCULAR; INTRAVENOUS ONCE
Status: COMPLETED | OUTPATIENT
Start: 2023-03-05 | End: 2023-03-05

## 2023-03-05 RX ORDER — SUCRALFATE 1 G/1
1 TABLET ORAL ONCE
Status: COMPLETED | OUTPATIENT
Start: 2023-03-05 | End: 2023-03-05

## 2023-03-05 RX ORDER — SUCRALFATE 1 G/1
1 TABLET ORAL 4 TIMES DAILY PRN
Qty: 12 TABLET | Refills: 0 | Status: SHIPPED | OUTPATIENT
Start: 2023-03-05 | End: 2023-03-08

## 2023-03-05 RX ORDER — ONDANSETRON 4 MG/1
4 TABLET, FILM COATED ORAL EVERY 8 HOURS PRN
Qty: 12 TABLET | Refills: 0 | Status: SHIPPED | OUTPATIENT
Start: 2023-03-05

## 2023-03-05 RX ORDER — METOCLOPRAMIDE HYDROCHLORIDE 5 MG/ML
5 INJECTION INTRAMUSCULAR; INTRAVENOUS ONCE
Status: COMPLETED | OUTPATIENT
Start: 2023-03-05 | End: 2023-03-05

## 2023-03-05 RX ORDER — POTASSIUM CHLORIDE 20 MEQ/1
20 TABLET, EXTENDED RELEASE ORAL ONCE
Status: COMPLETED | OUTPATIENT
Start: 2023-03-05 | End: 2023-03-05

## 2023-03-05 RX ADMIN — KETOROLAC TROMETHAMINE 15 MG: 30 INJECTION, SOLUTION INTRAMUSCULAR; INTRAVENOUS at 18:10

## 2023-03-05 RX ADMIN — SUCRALFATE 1 G: 1 TABLET ORAL at 18:10

## 2023-03-05 RX ADMIN — POTASSIUM CHLORIDE 20 MEQ: 1500 TABLET, EXTENDED RELEASE ORAL at 18:47

## 2023-03-05 RX ADMIN — METOCLOPRAMIDE 5 MG: 5 INJECTION, SOLUTION INTRAMUSCULAR; INTRAVENOUS at 18:10

## 2023-03-05 NOTE — ED PROVIDER NOTES
History  Chief Complaint   Patient presents with   • Abdominal Pain     Abdominal pain vomiting and diarrhea for 3 days     Patient is a 45-year-old female, with a history significant for bipolar 1, tubal ligation, cholecystectomy, who presents to the ED today, after evaluation at a care now, due to atraumatic, gradual onset, progressively worsening, constant, cramping and sharp in character, nonradiating, generalized abdominal pain that began on Friday  Patient reports associated gradual onset generalized headache that feels similar in character to prior headaches, nausea, nonbloody vomiting, nonbloody diarrhea (no history of A-fib, recent hospitalizations or antibiotic use or travel)  Patient denies associated fever, chest pain, dyspnea, urinary symptoms, weakness, numbness  Notably, on review of the medical record, patient has had 2 CT abdomen pelvis , 9/20/2022 as well as 10/30/2022 due to abdominal pain with associated vomiting and/or diarrhea  Upon my personal review of the medical record, the scans were unremarkable for acute pathology  Eating exacerbates her symptoms  No clear remitting factors  Patient is without other concerns at this time  Prior to Admission Medications   Prescriptions Last Dose Informant Patient Reported? Taking?    SUMAtriptan (Imitrex) 25 mg tablet   No No   Sig: Take 1 tablet (25 mg total) by mouth once as needed for migraine   albuterol (2 5 mg/3 mL) 0 083 % nebulizer solution   No No   Sig: Take 3 mL (2 5 mg total) by nebulization every 6 (six) hours as needed for wheezing or shortness of breath   albuterol (Ventolin HFA) 90 mcg/act inhaler   No No   Sig: Inhale 2 puffs every 4 (four) hours as needed for wheezing or shortness of breath   buPROPion (Wellbutrin SR) 150 mg 12 hr tablet   No No   Sig: Take 1 tablet (150 mg total) by mouth 2 (two) times a day   chlorhexidine (PERIDEX) 0 12 % solution   No No   Sig: Apply 15 mL to the mouth or throat 2 (two) times a day ibuprofen (MOTRIN) 600 mg tablet   Yes No   Sig: Take 600 mg by mouth every 6 (six) hours as needed   ondansetron (ZOFRAN-ODT) 4 mg disintegrating tablet   No No   Sig: Take 1 tablet (4 mg total) by mouth every 6 (six) hours as needed for nausea or vomiting   pantoprazole (PROTONIX) 40 mg tablet   No No   Sig: Take 1 tablet (40 mg total) by mouth daily On an empty stomach with water - 30 minutes prior to eating or drinking anything else   propranolol (INDERAL LA) 80 mg 24 hr capsule   No No   Sig: Take 1 capsule (80 mg total) by mouth daily      Facility-Administered Medications: None       Past Medical History:   Diagnosis Date   • Anxiety    • Asthma    • Bipolar 1 disorder (HCC)    • Depression    • GERD (gastroesophageal reflux disease)    • Hypercholesterolemia        Past Surgical History:   Procedure Laterality Date   • CHOLECYSTECTOMY     • FL INJECTION RIGHT HIP (ARTHROGRAM)  11/23/2020   • FL INJECTION RIGHT HIP (NON ARTHROGRAM)  8/10/2020   • TONSILLECTOMY     • TUBAL LIGATION         Family History   Problem Relation Age of Onset   • Mental illness Mother    • Depression Mother    • Cancer Mother    • Hyperlipidemia Mother    • Asthma Father    • Arthritis Father    • Cancer Brother    • Psoriasis Daughter      I have reviewed and agree with the history as documented  E-Cigarette/Vaping   • E-Cigarette Use Current Every Day User      E-Cigarette/Vaping Substances   • Nicotine No    • THC Yes    • CBD No    • Flavoring No    • Other No    • Unknown No      Social History     Tobacco Use   • Smoking status: Every Day     Packs/day: 0 50     Types: Cigarettes   • Smokeless tobacco: Never   Vaping Use   • Vaping Use: Every day   • Substances: THC   Substance Use Topics   • Alcohol use: Not Currently   • Drug use: Not Currently        Review of Systems   Constitutional: Negative for fever  HENT: Negative for trouble swallowing  Eyes: Negative for visual disturbance     Respiratory: Negative for shortness of breath  Cardiovascular: Negative for chest pain  Gastrointestinal: Positive for abdominal pain, diarrhea, nausea and vomiting  Negative for blood in stool  Endocrine: Negative for polyuria  Genitourinary: Negative for dysuria  Musculoskeletal: Negative for gait problem  Skin: Negative for rash  Allergic/Immunologic: Positive for environmental allergies  Neurological: Positive for headaches  Negative for weakness and numbness  Hematological: Negative for adenopathy  Psychiatric/Behavioral: Negative for confusion  All other systems reviewed and are negative  Physical Exam  ED Triage Vitals [03/05/23 1706]   Temperature Pulse Respirations Blood Pressure SpO2   (!) 97 1 °F (36 2 °C) 97 20 (!) 171/82 98 %      Temp Source Heart Rate Source Patient Position - Orthostatic VS BP Location FiO2 (%)   Temporal -- Sitting Right arm --      Pain Score       8             Orthostatic Vital Signs  Vitals:    03/05/23 1706   BP: (!) 171/82   Pulse: 97   Patient Position - Orthostatic VS: Sitting       Physical Exam  Vitals and nursing note reviewed  Constitutional:       General: She is not in acute distress  Appearance: She is obese  She is not ill-appearing, toxic-appearing or diaphoretic  Comments: Patient appears comfortable during my evaluation    HENT:      Head: Normocephalic and atraumatic  Right Ear: External ear normal       Left Ear: External ear normal       Nose: Nose normal  No rhinorrhea  Mouth/Throat:      Mouth: Mucous membranes are moist       Pharynx: Oropharynx is clear  No oropharyngeal exudate or posterior oropharyngeal erythema  Comments: Uvula midline  No oropharyngeal or submandibular mass/swelling  Eyes:      General: No scleral icterus  Right eye: No discharge  Left eye: No discharge  Conjunctiva/sclera: Conjunctivae normal       Pupils: Pupils are equal, round, and reactive to light     Cardiovascular:      Rate and Rhythm: Normal rate and regular rhythm  Pulses: Normal pulses  Heart sounds: Normal heart sounds  No murmur heard  No friction rub  No gallop  Comments: 2+ Radial  Pulmonary:      Effort: Pulmonary effort is normal  No respiratory distress  Breath sounds: Normal breath sounds  No stridor  No wheezing, rhonchi or rales  Abdominal:      General: Abdomen is flat  There is no distension  Palpations: Abdomen is soft  Tenderness: There is abdominal tenderness (Generalized)  There is no right CVA tenderness, left CVA tenderness, guarding or rebound  Musculoskeletal:         General: No deformity  Cervical back: Normal range of motion and neck supple  No rigidity or tenderness  No muscular tenderness  Right lower leg: No edema  Left lower leg: No edema  Lymphadenopathy:      Cervical: No cervical adenopathy  Skin:     General: Skin is warm and dry  Capillary Refill: Capillary refill takes less than 2 seconds  Neurological:      Mental Status: She is alert  Comments: AAO x3  Cranial nerves 2-12 intact  5/5 strength in all four extremities  Sensation to light touch intact in all four extremities  No pronator drift  Patient is speaking clearly in complete sentences  Patient is answering appropriately and able follow commands     Psychiatric:         Mood and Affect: Mood normal          Behavior: Behavior normal          ED Medications  Medications   potassium chloride (K-DUR,KLOR-CON) CR tablet 20 mEq (has no administration in time range)   metoclopramide (REGLAN) injection 5 mg (5 mg Intravenous Given 3/5/23 1810)   sucralfate (CARAFATE) tablet 1 g (1 g Oral Given 3/5/23 1810)   ketorolac (TORADOL) injection 15 mg (15 mg Intravenous Given 3/5/23 1810)       Diagnostic Studies  Results Reviewed     Procedure Component Value Units Date/Time    Comprehensive metabolic panel [[id number]]  (Abnormal) Collected: 03/05/23 1279    Lab Status: Final result Specimen: Blood from Arm, Right Updated: 03/05/23 1820     Sodium 138 mmol/L      Potassium 3 4 mmol/L      Chloride 104 mmol/L      CO2 25 mmol/L      ANION GAP 9 mmol/L      BUN 4 mg/dL      Creatinine 0 56 mg/dL      Glucose 110 mg/dL      Calcium 9 3 mg/dL      AST 18 U/L      ALT 23 U/L      Alkaline Phosphatase 61 U/L      Total Protein 7 2 g/dL      Albumin 4 1 g/dL      Total Bilirubin 0 39 mg/dL      eGFR 120 ml/min/1 73sq m     Narrative:      National Kidney Disease Foundation guidelines for Chronic Kidney Disease (CKD):   •  Stage 1 with normal or high GFR (GFR > 90 mL/min/1 73 square meters)  •  Stage 2 Mild CKD (GFR = 60-89 mL/min/1 73 square meters)  •  Stage 3A Moderate CKD (GFR = 45-59 mL/min/1 73 square meters)  •  Stage 3B Moderate CKD (GFR = 30-44 mL/min/1 73 square meters)  •  Stage 4 Severe CKD (GFR = 15-29 mL/min/1 73 square meters)  •  Stage 5 End Stage CKD (GFR <15 mL/min/1 73 square meters)  Note: GFR calculation is accurate only with a steady state creatinine    Lipase [[id number]]  (Normal) Collected: 03/05/23 1745    Lab Status: Final result Specimen: Blood from Arm, Right Updated: 03/05/23 1820     Lipase 27 u/L     UA w Reflex to Microscopic w Reflex to Culture [[id number]] Collected: 03/05/23 1803    Lab Status: Final result Specimen: Urine, Clean Catch Updated: 03/05/23 1818     Color, UA Yellow     Clarity, UA Slightly Cloudy     Specific Gravity, UA 1 025     pH, UA 6 0     Leukocytes, UA Negative     Nitrite, UA Negative     Protein, UA Negative mg/dl      Glucose, UA Negative mg/dl      Ketones, UA Negative mg/dl      Urobilinogen, UA 0 2 E U /dl      Bilirubin, UA Negative     Occult Blood, UA Negative    CBC and differential [[id number]]  (Abnormal) Collected: 03/05/23 1745    Lab Status: Final result Specimen: Blood from Arm, Right Updated: 03/05/23 1805     WBC 10 14 Thousand/uL      RBC 5 17 Million/uL      Hemoglobin 15 1 g/dL      Hematocrit 45 1 %      MCV 87 fL      MCH 29 2 pg      MCHC 33 5 g/dL      RDW 13 2 %      MPV 9 3 fL      Platelets 669 Thousands/uL      nRBC 0 /100 WBCs      Neutrophils Relative 64 %      Immat GRANS % 1 %      Lymphocytes Relative 28 %      Monocytes Relative 4 %      Eosinophils Relative 3 %      Basophils Relative 0 %      Neutrophils Absolute 6 54 Thousands/µL      Immature Grans Absolute 0 08 Thousand/uL      Lymphocytes Absolute 2 79 Thousands/µL      Monocytes Absolute 0 43 Thousand/µL      Eosinophils Absolute 0 26 Thousand/µL      Basophils Absolute 0 04 Thousands/µL     POCT pregnancy, urine [[id number]]  (Normal) Resulted: 03/05/23 1803    Lab Status: Final result Updated: 03/05/23 1803     EXT Preg Test, Ur Negative     Control Valid    COVID/FLU/RSV [[id number]] Collected: 03/05/23 1745    Lab Status: In process Specimen: Nares from Nose Updated: 03/05/23 1801                 No orders to display         Procedures  Procedures      ED Course  ED Course as of 03/05/23 1846   Sun Mar 05, 2023   1750 ECG per my independent interpretation: Normal rate, regular rhythm, no ectopy, normal axis, no ST elevations or depressions     1840 Leukocytes, UA: Negative   1840 Nitrite, UA: Negative   1840 PREGNANCY TEST URINE: Negative  WNL   1840 WBC: 10 14  WNL   1840 Potassium(!): 3 4  Low     1840 Lipase: 27  WNL   1841 Patient reports improvement in symptoms  Patient feels comfortable going home  Patient asked for work note  On repeat palpation of the abdomen, patient's abdomen is soft and without rebound or guarding  SBIRT 22yo+    Flowsheet Row Most Recent Value   SBIRT (23 yo +)    In order to provide better care to our patients, we are screening all of our patients for alcohol and drug use  Would it be okay to ask you these screening questions? Yes Filed at: 03/05/2023 2157   Initial Alcohol Screen: US AUDIT-C     1  How often do you have a drink containing alcohol? 0 Filed at: 03/05/2023 1833   2   How many drinks containing alcohol do you have on a typical day you are drinking? 0 Filed at: 03/05/2023 1833   3b  FEMALE Any Age, or MALE 65+: How often do you have 4 or more drinks on one occassion? 0 Filed at: 03/05/2023 1833   Audit-C Score 0 Filed at: 03/05/2023 0127   YINKA: How many times in the past year have you    Used an illegal drug or used a prescription medication for non-medical reasons? Never Filed at: 03/05/2023 1833                Medical Decision Making  Patient is a 43-year-old female, with a history significant for bipolar 1, tubal ligation, cholecystectomy, who presents to the ED today, after evaluation at a care now, due to atraumatic, gradual onset, progressively worsening, constant, cramping and sharp in character, nonradiating, generalized abdominal pain that began on Friday  Patient reports associated gradual onset generalized headache that feels similar in character to prior headaches, nausea, nonbloody vomiting, nonbloody diarrhea (no history of A-fib, recent hospitalizations or antibiotic use or travel)  Patient denies associated fever, chest pain, dyspnea, urinary symptoms, weakness, numbness  Notably, on review of the medical record, patient has had 2 CT abdomen pelvis , 9/20/2022 as well as 10/30/2022 due to abdominal pain with associated vomiting and/or diarrhea  Upon my personal review of the medical record, the scans were unremarkable for acute pathology and reproductive organs were within normal limits  Eating exacerbates her symptoms  Patient is currently afebrile and hemodynamically stable  Her physical exam is notable for overall well appearance but discomfort to palpation of the generalized abdomen without rebound or guarding  No focal neurodeficits, no nuchal rigidity  This presentation is concerning for: Primary headache, viral syndrome, bipolar 1/chronic abdominal pain  I also considered UTI, pancreatitis, gastritis, pregnancy, peptic ulcer disease    No reason to suspect meningitis, SAH, appendicitis, ureterolithiasis, ovarian torsion at this time based on history and physical exam   Will investigate with CBC, CMP, lipase, UA, pregnancy test, viral testing, ECG  Will manage with Reglan, Toradol, Carafate, further based upon work-up  Will refer to GI and neurology     - No language barrier    - History obtained from patient  - There are no limitations to the history obtained  - External record review performed of previous charting was performed by me  - I independently reviewed and interpreted ordered labs, ECGs from this encounter   - Patient's medication regimen reviewed  - Patient's comorbidities factored into diagnosis considerations and disposition planning  Amount and/or Complexity of Data Reviewed  Labs: ordered  Decision-making details documented in ED Course  Risk  Prescription drug management  Disposition  Final diagnoses:   Acute abdominal pain   Nausea and vomiting   Diarrhea   Elevated blood pressure reading   Acute headache   Hypokalemia     Time reflects when diagnosis was documented in both MDM as applicable and the Disposition within this note     Time User Action Codes Description Comment    3/5/2023  5:22 PM Sueellen Nipper A Add [R10 9] Acute abdominal pain     3/5/2023  5:22 PM Sarah Adam [R11 2] Nausea and vomiting     3/5/2023  5:22 PM Sueellen Nipper Add [R19 7] Diarrhea     3/5/2023  5:22 PM Mini Adam A Add [R03 0] Elevated blood pressure reading     3/5/2023  5:22 PM Sueellen Nipper A Add [R51 9] Acute headache     3/5/2023  6:40 PM Sueellen Nipper A Add [E87 6] Hypokalemia       ED Disposition     ED Disposition   Discharge    Condition   Stable    Date/Time   Sun Mar 5, 2023  6:45 PM    Comment   Edward Powers discharge to home/self care                 Follow-up Information     Follow up With Specialties Details Why Contact Info Additional [address] [facility] Schedule an appointment as soon as possible for a visit   [address] [place] [address]  [phone]       [facility] Schedule an appointment as soon as possible for a visit   [address] [phone]  Stacy Pumaziggy [address] [facility] [address], [place], [place], [address] [facility] [address]    [facility] Schedule an appointment as soon as possible for a visit   [address] [address]  [address] [facility] [address] [facility], [facility] [address], [facility], [address] [place] [address]   [phone]          Patient's Medications   Discharge Prescriptions    ONDANSETRON (ZOFRAN) 4 MG TABLET    Take 1 tablet (4 mg total) by mouth every 8 (eight) hours as needed for nausea or vomiting       Start Date: 3/5/2023  End Date: --       Order Dose: 4 mg       Quantity: 12 tablet    Refills: 0    SUCRALFATE (CARAFATE) 1 G TABLET    Take 1 tablet (1 g total) by mouth 4 (four) times a day as needed (Abdominal pain) for up to 3 days       Start Date: 3/5/2023  End Date: 3/8/2023       Order Dose: 1 g       Quantity: 12 tablet    Refills: 0         PDMP Review     None           ED Provider  Attending physically available and evaluated Jerrod First VILLARREAL managed the patient along with the ED Attending      Electronically Signed by         Myriam Landry MD  03/05/23 0737

## 2023-03-05 NOTE — PROGRESS NOTES
[address] [facility]        NAME: Aren Villarreal is a 40 y o  female  : 1985    MRN: [id number]  DATE: 2023  TIME: 4:52 PM    Assessment and Plan   Abdominal pain, unspecified abdominal location [R10 9]  1  Abdominal pain, unspecified abdominal location  POCT urine dip    POCT urine HCG    Transfer to other facility            Patient Instructions       Follow up with PCP in 3-5 days  Proceed to  ER if symptoms worsen  Chief Complaint     Chief Complaint   Patient presents with   • Vomiting     Nausea/v/d and stomach pain since Friday , migraine headache started today, unable to keep food down but is able to keep water down          History of Present Illness       Started Friday with nausea and vomiting  She is also having abdominal pain  She hasn't really been able to eat much, has been able to drink water and keep it down but otherwise starts vomiting when attempts to eat  She has also had liquid stools  She did have a fever last night and took tylenol  She has also started with a migraine - she has a history of stress migraines  The patient has generalized abdominal pain  She is tender, winces in pain with palpation  She reports she has had chills  Given these symptoms and presentation I feel she is best served with a further evaluation in the Emergency department  Patient is agreeable to same and will go to [facility] evaluation  [place] text sent to charge nurse  Review of Systems   Review of Systems   Constitutional: Positive for appetite change, chills, fatigue and fever  HENT: Positive for congestion  Negative for ear pain, postnasal drip, rhinorrhea, sinus pressure, sinus pain, sore throat and trouble swallowing  Eyes: Negative for pain and visual disturbance  Respiratory: Positive for shortness of breath  Negative for cough  Intermittent SOB   Cardiovascular: Positive for chest pain  Negative for palpitations          Intermittent CP   Gastrointestinal: Positive for abdominal distention, abdominal pain, diarrhea, nausea and vomiting  Negative for constipation  Genitourinary: Negative for dysuria, frequency, hematuria, menstrual problem, pelvic pain and urgency  Musculoskeletal: Positive for myalgias  Negative for arthralgias and back pain  Skin: Negative for color change and rash  Neurological: Positive for dizziness, light-headedness and headaches  Negative for seizures and syncope  All other systems reviewed and are negative          Current Medications       Current Outpatient Medications:   •  albuterol (2 5 mg/3 mL) 0 083 % nebulizer solution, Take 3 mL (2 5 mg total) by nebulization every 6 (six) hours as needed for wheezing or shortness of breath, Disp: 3 mL, Rfl: 1  •  albuterol (Ventolin HFA) 90 mcg/act inhaler, Inhale 2 puffs every 4 (four) hours as needed for wheezing or shortness of breath, Disp: 18 g, Rfl: 5  •  buPROPion (Wellbutrin SR) 150 mg 12 hr tablet, Take 1 tablet (150 mg total) by mouth 2 (two) times a day, Disp: 180 tablet, Rfl: 1  •  pantoprazole (PROTONIX) 40 mg tablet, Take 1 tablet (40 mg total) by mouth daily On an empty stomach with water - 30 minutes prior to eating or drinking anything else, Disp: 56 tablet, Rfl: 0  •  propranolol (INDERAL LA) 80 mg 24 hr capsule, Take 1 capsule (80 mg total) by mouth daily, Disp: 30 capsule, Rfl: 1  •  SUMAtriptan (Imitrex) 25 mg tablet, Take 1 tablet (25 mg total) by mouth once as needed for migraine, Disp: 10 tablet, Rfl: 0  •  chlorhexidine (PERIDEX) 0 12 % solution, Apply 15 mL to the mouth or throat 2 (two) times a day, Disp: 120 mL, Rfl: 0  •  ibuprofen (MOTRIN) 600 mg tablet, Take 600 mg by mouth every 6 (six) hours as needed, Disp: , Rfl:   •  ondansetron (ZOFRAN-ODT) 4 mg disintegrating tablet, Take 1 tablet (4 mg total) by mouth every 6 (six) hours as needed for nausea or vomiting, Disp: 15 tablet, Rfl: 0    Current Allergies     Allergies as of 03/05/2023 - Reviewed 03/05/2023   Allergen Reaction Noted   • Medical tape Rash 08/19/2013            The following portions of the patient's history were reviewed and updated as appropriate: allergies, current medications, past family history, past medical history, past social history, past surgical history and problem list      Past Medical History:   Diagnosis Date   • Anxiety    • Asthma    • Bipolar 1 disorder ([address] )    • Depression    • GERD (gastroesophageal reflux disease)    • Hypercholesterolemia        Past Surgical History:   Procedure Laterality Date   • CHOLECYSTECTOMY     • FL INJECTION RIGHT HIP (ARTHROGRAM)  11/23/2020   • FL INJECTION RIGHT HIP (NON ARTHROGRAM)  8/10/2020   • TONSILLECTOMY     • TUBAL LIGATION         Family History   Problem Relation Age of Onset   • Mental illness Mother    • Depression Mother    • Cancer Mother    • Hyperlipidemia Mother    • Asthma Father    • Arthritis Father    • Cancer Brother    • Psoriasis Daughter          Medications have been verified  Objective   /92   Pulse 88   Temp 97 9 °F (36 6 °C)   Resp 20   Ht 5' 4" (1 626 m)   Wt 113 kg (250 lb)   LMP 02/10/2023   SpO2 98%   BMI 42 91 kg/m²        Physical Exam     Physical Exam  Vitals and nursing note reviewed  Constitutional:       General: She is awake  She is not in acute distress  Appearance: Normal appearance  She is morbidly obese  She is not ill-appearing  HENT:      Head: Normocephalic and atraumatic  Right Ear: Tympanic membrane, ear canal and external ear normal       Left Ear: Tympanic membrane, ear canal and external ear normal       Nose: Congestion present  Mouth/Throat:      Mouth: Mucous membranes are moist       Pharynx: Oropharynx is clear  Eyes:      Extraocular Movements: Extraocular movements intact  Conjunctiva/sclera: Conjunctivae normal       Pupils: Pupils are equal, round, and reactive to light  Cardiovascular:      Rate and Rhythm: Normal rate and regular rhythm        Pulses: Normal pulses  Heart sounds: Normal heart sounds  Pulmonary:      Effort: Pulmonary effort is normal       Breath sounds: Normal breath sounds  Abdominal:      General: Abdomen is flat  Bowel sounds are normal       Palpations: Abdomen is soft  Musculoskeletal:         General: Normal range of motion  Cervical back: Normal range of motion and neck supple  Skin:     General: Skin is warm  Capillary Refill: Capillary refill takes less than 2 seconds  Neurological:      General: No focal deficit present  Mental Status: She is alert and oriented to person, place, and time  Psychiatric:         Mood and Affect: Mood normal          Behavior: Behavior normal  Behavior is cooperative

## 2023-03-05 NOTE — DISCHARGE INSTRUCTIONS
You were evaluated in the emergency department for: abdominal pain  You can access your current and pending results through [facility]  A radiologist will take a second look at your X-Rays, if you had any, and you will be contacted with any new findings  You should follow-up with your primary care provider, as soon as possible, for re-evaluation  If you do not have a primary care provider, I have referred you to [address] [place] [address]  You will be contacted about scheduling an appointment  Their phone number is also included on this paperwork  Have also been referred to gastroenterology and you should follow-up with them as well  You have also been referred to neurology: I recommend that you google and keep a headache journal prior to seeing them  You may take 650mg of tylenol every four to six hours, not exceeding 3,000mg daily, for the management of your discomfort  You may also take ibuprofen, 400mg every six to eight hours  Your workup revealed no emergent features at this time; however, many disease processes are dynamic:    Please, return to the emergency department if you experience new or worsening symptoms, fever, chest pain, shortness of breath, difficulty breathing, dizziness, abdominal pain, persistent nausea/vomiting, syncope or passing out, blood in your urine or stool, coughing up blood, leg swelling/pain, urinary retention, bowel or bladder incontinence, numbness between your legs  Additionally, your blood pressure was measured to be high  This is something that you should discuss with your primary care provider and have re-checked within one week

## 2023-03-05 NOTE — Clinical Note
Brooks Perdomo was seen and treated in our emergency department on 3/5/2023  Diagnosis:     Cali Cruz  may return to work on return date  She may return on this date: 03/06/2023         If you have any questions or concerns, please don't hesitate to call        Oskar Galan MD    ______________________________           _______________          _______________  Hospital Representative                              Date                                Time

## 2023-03-08 LAB
ATRIAL RATE: 86 BPM
P AXIS: 62 DEGREES
PR INTERVAL: 162 MS
QRS AXIS: 44 DEGREES
QRSD INTERVAL: 106 MS
QT INTERVAL: 390 MS
QTC INTERVAL: 466 MS
T WAVE AXIS: 57 DEGREES
VENTRICULAR RATE: 86 BPM

## 2023-03-14 PROBLEM — K52.9 GASTROENTERITIS: Status: RESOLVED | Noted: 2019-03-23 | Resolved: 2023-03-14

## 2023-07-28 ENCOUNTER — APPOINTMENT (EMERGENCY)
Dept: RADIOLOGY | Facility: HOSPITAL | Age: 38
End: 2023-07-28
Payer: COMMERCIAL

## 2023-07-28 ENCOUNTER — HOSPITAL ENCOUNTER (EMERGENCY)
Facility: HOSPITAL | Age: 38
Discharge: HOME | End: 2023-07-28
Attending: EMERGENCY MEDICINE
Payer: COMMERCIAL

## 2023-07-28 VITALS
HEART RATE: 78 BPM | DIASTOLIC BLOOD PRESSURE: 89 MMHG | HEIGHT: 64 IN | OXYGEN SATURATION: 97 % | TEMPERATURE: 98 F | WEIGHT: 280 LBS | BODY MASS INDEX: 47.8 KG/M2 | SYSTOLIC BLOOD PRESSURE: 145 MMHG | RESPIRATION RATE: 16 BRPM

## 2023-07-28 DIAGNOSIS — M25.561 ACUTE PAIN OF RIGHT KNEE: Primary | ICD-10-CM

## 2023-07-28 PROCEDURE — 73564 X-RAY EXAM KNEE 4 OR MORE: CPT

## 2023-07-28 RX ORDER — KETOROLAC TROMETHAMINE 30 MG/ML
30 INJECTION, SOLUTION INTRAMUSCULAR; INTRAVENOUS ONCE
Status: COMPLETED | OUTPATIENT
Start: 2023-07-28 | End: 2023-07-28

## 2023-07-28 RX ORDER — NAPROXEN 500 MG/1
500 TABLET ORAL 2 TIMES DAILY WITH MEALS
Qty: 14 TABLET | Refills: 0 | Status: SHIPPED | OUTPATIENT
Start: 2023-07-28 | End: 2023-08-04

## 2023-07-28 RX ADMIN — KETOROLAC TROMETHAMINE 30 MG: 30 INJECTION, SOLUTION INTRAMUSCULAR at 11:05

## 2023-07-28 NOTE — ED PROVIDER NOTES
History  Chief Complaint   Patient presents with   • Knee Pain     Right knee pain for 3 weeks, denies injury. PCP put on prednisone but not helping     The patient is a 44-year-old female who presents emergency department today with a chief complaint of right knee pain. The patient states that she has had ongoing issues since she was young. States she had an orthopedist at that time and was told that she had "limited cartilage in between her knee". States that she has had ongoing pain since but over the last 2 weeks it has gradually worsened. She denies any falls or known injury. States she hears crepitus with walking. Pain is worse with ambulation better with rest.  Has been taking Tylenol Motrin without relief. Has not seen any specialist for this. Knee Pain  Location:  Knee  Injury: no    Knee location:  R knee  Pain details:     Quality:  Aching and shooting    Severity:  Moderate    Onset quality:  Gradual    Timing:  Constant    Progression:  Worsening  Chronicity:  Recurrent  Dislocation: no    Foreign body present:  No foreign bodies  Tetanus status:  Unknown  Relieved by:  Nothing  Worsened by:  Bearing weight  Ineffective treatments:  Acetaminophen, movement, rest and NSAIDs  Associated symptoms: no decreased ROM, no fatigue, no fever, no itching, no muscle weakness and no stiffness    Risk factors: no known bone disorder        Prior to Admission Medications   Prescriptions Last Dose Informant Patient Reported? Taking?    SUMAtriptan (Imitrex) 25 mg tablet   No No   Sig: Take 1 tablet (25 mg total) by mouth once as needed for migraine   albuterol (2.5 mg/3 mL) 0.083 % nebulizer solution   No No   Sig: Take 3 mL (2.5 mg total) by nebulization every 6 (six) hours as needed for wheezing or shortness of breath   albuterol (Ventolin HFA) 90 mcg/act inhaler   No No   Sig: Inhale 2 puffs every 4 (four) hours as needed for wheezing or shortness of breath   buPROPion (Wellbutrin SR) 150 mg 12 hr tablet No No   Sig: Take 1 tablet (150 mg total) by mouth 2 (two) times a day   chlorhexidine (PERIDEX) 0.12 % solution   No No   Sig: Apply 15 mL to the mouth or throat 2 (two) times a day   ibuprofen (MOTRIN) 600 mg tablet   Yes No   Sig: Take 600 mg by mouth every 6 (six) hours as needed   ondansetron (ZOFRAN) 4 mg tablet   No No   Sig: Take 1 tablet (4 mg total) by mouth every 8 (eight) hours as needed for nausea or vomiting   ondansetron (ZOFRAN-ODT) 4 mg disintegrating tablet   No No   Sig: Take 1 tablet (4 mg total) by mouth every 6 (six) hours as needed for nausea or vomiting   pantoprazole (PROTONIX) 40 mg tablet   No No   Sig: Take 1 tablet (40 mg total) by mouth daily On an empty stomach with water - 30 minutes prior to eating or drinking anything else   propranolol (INDERAL LA) 80 mg 24 hr capsule   No No   Sig: Take 1 capsule (80 mg total) by mouth daily   sucralfate (CARAFATE) 1 g tablet   No No   Sig: Take 1 tablet (1 g total) by mouth 4 (four) times a day as needed (Abdominal pain) for up to 3 days      Facility-Administered Medications: None       Past Medical History:   Diagnosis Date   • Anxiety    • Asthma    • Bipolar 1 disorder (HCC)    • Depression    • GERD (gastroesophageal reflux disease)    • Hypercholesterolemia        Past Surgical History:   Procedure Laterality Date   • CHOLECYSTECTOMY     • FL INJECTION RIGHT HIP (ARTHROGRAM)  11/23/2020   • FL INJECTION RIGHT HIP (NON ARTHROGRAM)  8/10/2020   • TONSILLECTOMY     • TUBAL LIGATION         Family History   Problem Relation Age of Onset   • Mental illness Mother    • Depression Mother    • Cancer Mother    • Hyperlipidemia Mother    • Asthma Father    • Arthritis Father    • Cancer Brother    • Psoriasis Daughter      I have reviewed and agree with the history as documented.     E-Cigarette/Vaping   • E-Cigarette Use Current Every Day User      E-Cigarette/Vaping Substances   • Nicotine No    • THC Yes    • CBD No    • Flavoring No    • Other No    • Unknown No      Social History     Tobacco Use   • Smoking status: Every Day     Packs/day: 0.50     Types: Cigarettes   • Smokeless tobacco: Never   Vaping Use   • Vaping Use: Every day   • Substances: THC   Substance Use Topics   • Alcohol use: Not Currently   • Drug use: Yes     Types: Marijuana     Comment: smokes marijuana once per month       Review of Systems   Constitutional: Negative for fatigue and fever. Musculoskeletal: Negative for stiffness. Skin: Negative for itching. Physical Exam  Physical Exam    Vital Signs  ED Triage Vitals   Temperature Pulse Respirations Blood Pressure SpO2   07/28/23 1107 07/28/23 1048 07/28/23 1048 07/28/23 1048 07/28/23 1048   98 °F (36.7 °C) 78 16 145/89 97 %      Temp src Heart Rate Source Patient Position - Orthostatic VS BP Location FiO2 (%)   -- 07/28/23 1048 07/28/23 1048 07/28/23 1048 --    Monitor Sitting Left arm       Pain Score       07/28/23 1048       7           Vitals:    07/28/23 1048   BP: 145/89   Pulse: 78   Patient Position - Orthostatic VS: Sitting         Visual Acuity      ED Medications  Medications   ketorolac (TORADOL) injection 30 mg (30 mg Intramuscular Given 7/28/23 1105)       Diagnostic Studies  Results Reviewed     None                 XR knee 4+ vw right injury   ED Interpretation by Tess Cadena PA-C (07/28 1139)   No acute fx        Final Result by Asia Andersen MD (07/28 1142)      No acute osseous abnormality. Workstation performed: [id number]                    Procedures  Procedures         ED Course                               SBIRT 22yo+    Flowsheet Row Most Recent Value   Initial Alcohol Screen: US AUDIT-C     1. How often do you have a drink containing alcohol? 0 Filed at: 07/28/2023 1048   2. How many drinks containing alcohol do you have on a typical day you are drinking? 0 Filed at: 07/28/2023 1048   3a. Male UNDER 65:  How often do you have five or more drinks on one occasion? 0 Filed at: 07/28/2023 1048   3b. FEMALE Any Age, or MALE 65+: How often do you have 4 or more drinks on one occassion? 0 Filed at: 07/28/2023 1048   Audit-C Score 0 Filed at: 07/28/2023 1048   YINKA: How many times in the past year have you. .. Used an illegal drug or used a prescription medication for non-medical reasons? Never Filed at: 07/28/2023 1048                    Medical Decision Making  The patient is a 60-year-old female who presents emergency department today with a chief complaint of right knee pain. The patient states that she has had ongoing issues since she was young. States she had an orthopedist at that time and was told that she had "limited cartilage in between her knee". States that she has had ongoing pain since but over the last 2 weeks it has gradually worsened. She denies any falls or known injury. States she hears crepitus with walking. Pain is worse with ambulation better with rest.  Has been taking Tylenol Motrin without relief. Has not seen any specialist for this. The patient's x-ray was unremarkable for any acute fractures or dislocations. Patient was given Ace wrap and crutches. We will treat for pain and refer to orthopedics for further evaluation and treatment the patient was in agreement with treatment plan. Patient's differential diagnosis included but was not limited to meniscus injury, strain, sprain, fracture    Amount and/or Complexity of Data Reviewed  Radiology: ordered and independent interpretation performed. Decision-making details documented in ED Course. Risk  Prescription drug management.           Disposition  Final diagnoses:   Acute pain of right knee     Time reflects when diagnosis was documented in both MDM as applicable and the Disposition within this note     Time User Action Codes Description Comment    7/28/2023 11:39 AM Robby Valencia Add [M25.561] Acute pain of right knee       ED Disposition     ED Disposition   Discharge Condition   Stable    Date/Time   Fri Jul 28, 2023 11:39 AM    Comment   Samantha Kang discharge to home/self care.                Follow-up Information     Follow up With Specialties Details Why [id number] [facility] Orthopedic Surgery Schedule an appointment as soon as possible for a visit  If symptoms worsen [address]  [address] [place] [address]  [phone]            Discharge Medication List as of 7/28/2023 11:41 AM      START taking these medications    Details   naproxen (NAPROSYN) 500 mg tablet Take 1 tablet (500 mg total) by mouth 2 (two) times a day with meals for 7 days, Starting Fri 7/28/2023, Until Fri 8/4/2023, Normal         CONTINUE these medications which have NOT CHANGED    Details   albuterol (2.5 mg/3 mL) 0.083 % nebulizer solution Take 3 mL (2.5 mg total) by nebulization every 6 (six) hours as needed for wheezing or shortness of breath, Starting Tue 7/26/2022, Normal      albuterol (Ventolin HFA) 90 mcg/act inhaler Inhale 2 puffs every 4 (four) hours as needed for wheezing or shortness of breath, Starting Tue 7/26/2022, Normal      buPROPion (Wellbutrin SR) 150 mg 12 hr tablet Take 1 tablet (150 mg total) by mouth 2 (two) times a day, Starting Fri 1/13/2023, Normal      chlorhexidine (PERIDEX) 0.12 % solution Apply 15 mL to the mouth or throat 2 (two) times a day, Starting Fri 1/13/2023, Normal      ibuprofen (MOTRIN) 600 mg tablet Take 600 mg by mouth every 6 (six) hours as needed, Starting Fri 12/23/2022, Until Sat 12/23/2023 at 2359, Historical Med      ondansetron (ZOFRAN) 4 mg tablet Take 1 tablet (4 mg total) by mouth every 8 (eight) hours as needed for nausea or vomiting, Starting Sun 3/5/2023, Normal      ondansetron (ZOFRAN-ODT) 4 mg disintegrating tablet Take 1 tablet (4 mg total) by mouth every 6 (six) hours as needed for nausea or vomiting, Starting Sun 1/8/2023, Normal      pantoprazole (PROTONIX) 40 mg tablet Take 1 tablet (40 mg total) by mouth daily On an empty stomach with water - 30 minutes prior to eating or drinking anything else, Starting Fri 1/13/2023, Until Fri 3/10/2023, Normal      propranolol (INDERAL LA) 80 mg 24 hr capsule Take 1 capsule (80 mg total) by mouth daily, Starting Fri 1/13/2023, Normal      sucralfate (CARAFATE) 1 g tablet Take 1 tablet (1 g total) by mouth 4 (four) times a day as needed (Abdominal pain) for up to 3 days, Starting Sun 3/5/2023, Until Wed 3/8/2023 at 2359, Normal      SUMAtriptan (Imitrex) 25 mg tablet Take 1 tablet (25 mg total) by mouth once as needed for migraine, Starting Fri 1/13/2023, Normal                 PDMP Review     None          ED Provider  Electronically Signed by           Gautam Olmedo PA-C  07/28/23 4663

## 2023-07-28 NOTE — Clinical Note
Kristie Govea was seen and treated in our emergency department on 7/28/2023. No work until cleared by Family Doctor/Orthopedics        Diagnosis:     Marie Monday  . She may return on this date: If you have any questions or concerns, please don't hesitate to call.       Elsa Pena PA-C    ______________________________           _______________          _______________  Hospital Representative                              Date                                Time

## 2023-07-28 NOTE — ED NOTES
Patient transported to [address] [place] [address], [address], [address]  07/28/23 8575

## 2023-08-02 ENCOUNTER — OFFICE VISIT (OUTPATIENT)
Dept: OBGYN CLINIC | Facility: CLINIC | Age: 38
End: 2023-08-02
Payer: COMMERCIAL

## 2023-08-02 VITALS
HEIGHT: 64 IN | SYSTOLIC BLOOD PRESSURE: 132 MMHG | DIASTOLIC BLOOD PRESSURE: 80 MMHG | BODY MASS INDEX: 48.14 KG/M2 | WEIGHT: 282 LBS | TEMPERATURE: 97.8 F | HEART RATE: 75 BPM

## 2023-08-02 DIAGNOSIS — E66.01 MORBID OBESITY WITH BMI OF 45.0-49.9, ADULT (HCC): ICD-10-CM

## 2023-08-02 DIAGNOSIS — M22.2X1 PATELLOFEMORAL PAIN SYNDROME OF RIGHT KNEE: Primary | ICD-10-CM

## 2023-08-02 DIAGNOSIS — M25.561 ACUTE PAIN OF RIGHT KNEE: ICD-10-CM

## 2023-08-02 PROCEDURE — 99214 OFFICE O/P EST MOD 30 MIN: CPT | Performed by: ORTHOPAEDIC SURGERY

## 2023-08-02 RX ORDER — TRAZODONE HYDROCHLORIDE 50 MG/1
TABLET ORAL
COMMUNITY
Start: 2023-06-29

## 2023-08-02 RX ORDER — OMEPRAZOLE 20 MG/1
CAPSULE, DELAYED RELEASE ORAL
COMMUNITY
Start: 2023-06-30

## 2023-08-02 RX ORDER — ARIPIPRAZOLE 10 MG/1
10 TABLET ORAL EVERY MORNING
COMMUNITY
Start: 2023-06-29

## 2023-08-02 RX ORDER — VARENICLINE TARTRATE 0.5 MG/1
TABLET, FILM COATED ORAL
COMMUNITY
Start: 2023-07-24

## 2023-08-02 NOTE — LETTER
August 2, 2023     Patient: Shyanne Tian  YOB: 1985  Date of Visit: 8/2/2023      To Whom it May Concern:    Shyanne Tian is under my professional care. Rashmi Mcdonald was seen in my office on 8/2/2023. Rashmi Mcdonald may return to work on 8/3/2023. She is permitted intermittent sitting, standing and walking as tolerated. This restriction is to remain in effect until she is rechecked in 3 to 4 weeks . If you have any questions or concerns, please don't hesitate to call.          Sincerely,          Vasquez Banegas        CC: Shyanne Tian

## 2023-08-02 NOTE — PROGRESS NOTES
ASSESSMENT/PLAN:    Diagnoses and all orders for this visit:    Patellofemoral pain syndrome of right knee  -     Ambulatory Referral to Physical Therapy; Future    Acute pain of right knee  -     Ambulatory referral to Orthopedic Surgery    Morbid obesity with BMI of 45.0-49.9, adult (HCC)    Other orders  -     ARIPiprazole (ABILIFY) 10 mg tablet; Take 10 mg by mouth every morning  -     omeprazole (PriLOSEC) 20 mg delayed release capsule; TAKE 1 CAPSULE ORALLY IN THE MORNING 1 HOUR BEFORE 1ST MEAL OF THE DAY  -     traZODone (DESYREL) 50 mg tablet  -     varenicline (CHANTIX) 0.5 mg tablet; take 1 tablet by mouth daily for 1-3 DAYS then 1 tablet twice a day for DAYS 4-7        Reviewed physical exam and imaging with patient at time of visit. Her symptoms are consistent with patellofemoral pain of her right knee. Referral sent for physical therapy, focus on lower extremity and core strengthening. A CS injection was offered for symptomatic relief, however, patient declined at this time. Continue weightbearing activity, as tolerated. A note was provided outlining limited duties. She will follow-up in 3-4 weeks for re-evaluation. The patient expresses understanding and is in agreement with today's treatment plan. Return in about 3 weeks (around 8/23/2023). _____________________________________________________  CHIEF COMPLAINT:  Chief Complaint   Patient presents with   • Right Knee - Pain         SUBJECTIVE:  Linda Pearson is a 41y.o. year old female who presents for initial evaluation of her right knee. The patient states that she has had ongoing pain in her right knee, since she was a child. She states that the pain significantly progressed within the past 3 weeks. She cannot recall any specific mechanism of injury. She reported to her local ED because she states the pain was intolerable. She reports anterior knee pain. She states that her pain is present with weightbearing activity and walking. She reports a popping sensation when moving from sitting to standing. She reports 4 episodes of instability in the past 3 weeks. She states that she has taken OTC NSAIDs for the pain. She states that the ED prescribed naproxen, but that also has not helped. The ED also provided a note to excuse her from work. She denies numbness and tingling.       PAST MEDICAL HISTORY:  Past Medical History:   Diagnosis Date   • Anxiety    • Asthma    • Bipolar 1 disorder ([address])    • Depression    • GERD (gastroesophageal reflux disease)    • Hypercholesterolemia        PAST SURGICAL HISTORY:  Past Surgical History:   Procedure Laterality Date   • CHOLECYSTECTOMY     • FL INJECTION RIGHT HIP (ARTHROGRAM)  11/23/2020   • FL INJECTION RIGHT HIP (NON ARTHROGRAM)  8/10/2020   • TONSILLECTOMY     • TUBAL LIGATION         FAMILY HISTORY:  Family History   Problem Relation Age of Onset   • Mental illness Mother    • Depression Mother    • Cancer Mother    • Hyperlipidemia Mother    • Asthma Father    • Arthritis Father    • Cancer Brother    • Psoriasis Daughter        SOCIAL HISTORY:  Social History     Tobacco Use   • Smoking status: Every Day     Packs/day: 0.50     Types: Cigarettes   • Smokeless tobacco: Never   Vaping Use   • Vaping Use: Every day   • Substances: THC   Substance Use Topics   • Alcohol use: Not Currently   • Drug use: Yes     Types: Marijuana     Comment: smokes marijuana once per month       MEDICATIONS:    Current Outpatient Medications:   •  albuterol (2.5 mg/3 mL) 0.083 % nebulizer solution, Take 3 mL (2.5 mg total) by nebulization every 6 (six) hours as needed for wheezing or shortness of breath, Disp: 3 mL, Rfl: 1  •  albuterol (Ventolin HFA) 90 mcg/act inhaler, Inhale 2 puffs every 4 (four) hours as needed for wheezing or shortness of breath, Disp: 18 g, Rfl: 5  •  ARIPiprazole (ABILIFY) 10 mg tablet, Take 10 mg by mouth every morning, Disp: , Rfl:   •  omeprazole (PriLOSEC) 20 mg delayed release capsule, TAKE 1 CAPSULE ORALLY IN THE MORNING 1 HOUR BEFORE 1ST MEAL OF THE DAY, Disp: , Rfl:   •  traZODone (DESYREL) 50 mg tablet, , Disp: , Rfl:   •  varenicline (CHANTIX) 0.5 mg tablet, take 1 tablet by mouth daily for 1-3 DAYS then 1 tablet twice a day for DAYS 4-7, Disp: , Rfl:   •  buPROPion (Wellbutrin SR) 150 mg 12 hr tablet, Take 1 tablet (150 mg total) by mouth 2 (two) times a day, Disp: 180 tablet, Rfl: 1  •  chlorhexidine (PERIDEX) 0.12 % solution, Apply 15 mL to the mouth or throat 2 (two) times a day, Disp: 120 mL, Rfl: 0  •  ibuprofen (MOTRIN) 600 mg tablet, Take 600 mg by mouth every 6 (six) hours as needed, Disp: , Rfl:   •  naproxen (NAPROSYN) 500 mg tablet, Take 1 tablet (500 mg total) by mouth 2 (two) times a day with meals for 7 days, Disp: 14 tablet, Rfl: 0  •  ondansetron (ZOFRAN) 4 mg tablet, Take 1 tablet (4 mg total) by mouth every 8 (eight) hours as needed for nausea or vomiting, Disp: 12 tablet, Rfl: 0  •  ondansetron (ZOFRAN-ODT) 4 mg disintegrating tablet, Take 1 tablet (4 mg total) by mouth every 6 (six) hours as needed for nausea or vomiting, Disp: 15 tablet, Rfl: 0  •  pantoprazole (PROTONIX) 40 mg tablet, Take 1 tablet (40 mg total) by mouth daily On an empty stomach with water - 30 minutes prior to eating or drinking anything else, Disp: 56 tablet, Rfl: 0  •  propranolol (INDERAL LA) 80 mg 24 hr capsule, Take 1 capsule (80 mg total) by mouth daily, Disp: 30 capsule, Rfl: 1  •  sucralfate (CARAFATE) 1 g tablet, Take 1 tablet (1 g total) by mouth 4 (four) times a day as needed (Abdominal pain) for up to 3 days, Disp: 12 tablet, Rfl: 0  •  SUMAtriptan (Imitrex) 25 mg tablet, Take 1 tablet (25 mg total) by mouth once as needed for migraine, Disp: 10 tablet, Rfl: 0    ALLERGIES:  Allergies   Allergen Reactions   • Medical Tape Rash     Rips skin off       Review of systems:   Constitutional: Negative for fatigue, fever or loss of apetite. HENT: Negative.     Respiratory: Negative for shortness of breath, dyspnea. Cardiovascular: Negative for chest pain/tightness. Gastrointestinal: Negative for abdominal pain, N/V. Endocrine: Negative for cold/heat intolerance, unexplained weight loss/gain. Genitourinary: Negative for flank pain, dysuria, hematuria. Musculoskeletal: As noted in HPI. Skin: Negative for rash. Neurological: Negative. Psychiatric/Behavioral: Negative for agitation. _____________________________________________________  PHYSICAL EXAMINATION:    Blood pressure 132/80, pulse 75, temperature 97.8 °F (36.6 °C), temperature source Temporal, height 5' 4" (1.626 m), weight 128 kg (282 lb).     General: well developed and well nourished, alert, oriented times 3 and appears comfortable  Psychiatric: Normal  HEENT: Benign  Cardiovascular: Regular    Pulmonary: No wheezing or stridor  Abdomen: Soft, Nontender  Skin: No masses, erythema, lacerations, fluctation, ulcerations  Neurovascular: Intact    MUSCULOSKELETAL EXAMINATION:    right knee(s) -   Patient ambulates with steady gait pattern  Uses No assistive device  No anatomical deformity  Skin is warm and dry to touch with no signs of erythema, ecchymosis, or infection   No soft tissue swelling or effusion noted  ROM (0° - 125°)   MMT: 5/5 throughout  TTP over patellar tendon and to a lesser extent distal pole patella  Flexor and extensor mechanisms are intact   + Apprehension test   Knee is stable to varus and valgus stress  Patella tracks centrally with palpable crepitus  Calf compartments are soft and supple  - Elizabeth's sign  2+ DP and PT pulses with brisk capillary refill to the toes  Sural, saphenous, tibial, superficial, and deep peroneal motor and sensory distributions intact  Sensation light touch intact distally      _____________________________________________________  STUDIES REVIEWED:  The attending physician has personally reviewed the pertinent films in PACS and the interpretation is as follows:    X-Ray of right knee taken on 7/28/23 were reviewed and showed no acute osseous abnormalities. Mild osteoarthritis in the lateral compartment of the tibiofemoral joint. The x-ray report was reviewed. The ER note was reviewed.       Scribe Attestation    I,:  Christian Dumont am acting as a scribe while in the presence of the attending physician.:       I,:  Abilio Olivier personally performed the services described in this documentation    as scribed in my presence.:

## 2024-07-11 ENCOUNTER — OFFICE VISIT (OUTPATIENT)
Dept: URGENT CARE | Facility: CLINIC | Age: 39
End: 2024-07-11

## 2024-07-11 VITALS
HEART RATE: 83 BPM | RESPIRATION RATE: 20 BRPM | WEIGHT: 230 LBS | SYSTOLIC BLOOD PRESSURE: 116 MMHG | DIASTOLIC BLOOD PRESSURE: 80 MMHG | HEIGHT: 64 IN | BODY MASS INDEX: 39.27 KG/M2 | TEMPERATURE: 98.2 F | OXYGEN SATURATION: 98 %

## 2024-07-11 DIAGNOSIS — R11.2 NAUSEA AND VOMITING, UNSPECIFIED VOMITING TYPE: Primary | ICD-10-CM

## 2024-07-11 PROCEDURE — G0382 LEV 3 HOSP TYPE B ED VISIT: HCPCS

## 2024-07-11 RX ORDER — ONDANSETRON HYDROCHLORIDE 8 MG/1
8 TABLET, FILM COATED ORAL EVERY 8 HOURS PRN
Qty: 20 TABLET | Refills: 0 | Status: SHIPPED | OUTPATIENT
Start: 2024-07-11

## 2024-07-11 RX ORDER — CLONAZEPAM 0.5 MG/1
0.5 TABLET ORAL DAILY
COMMUNITY

## 2024-07-11 RX ORDER — ONDANSETRON 4 MG/1
8 TABLET, ORALLY DISINTEGRATING ORAL ONCE
Status: COMPLETED | OUTPATIENT
Start: 2024-07-11 | End: 2024-07-11

## 2024-07-11 RX ADMIN — ONDANSETRON 8 MG: 4 TABLET, ORALLY DISINTEGRATING ORAL at 16:32

## 2024-07-11 NOTE — PROGRESS NOTES
"  [facility]        NAME: Genevieve Go is a 39 y.o. female  : 1985    MRN: [id number]  DATE: 2024  TIME: 6:05 PM    Assessment and Plan   Nausea and vomiting, unspecified vomiting type [R11.2]  1. Nausea and vomiting, unspecified vomiting type  ondansetron (ZOFRAN-ODT) dispersible tablet 8 mg    ondansetron (ZOFRAN) 8 mg tablet        Nausea and vomiting.  Found some relief with Zofran and represcribing for patient.  Advised brat diet and pushing fluids.  Marked abdominal pain without red flag symptoms.  Advised to monitor and should not develop fever, worsening abdominal pain, chills and advised to report to the ER if experiencing    Patient Instructions       Follow up with PCP in 3-5 days.  Proceed to  ER if symptoms worsen.    If tests are performed, our office will contact you with results only if changes need to made to the care plan discussed with you at the visit. [facility]    Chief Complaint     Chief Complaint   Patient presents with   • Vomiting     C/O vomiting and diarrhea since yesterday, associated with lower abdominal pain and cramping. Vomited x 9 and diarrhea x15 in the last 24 hours. Tolerating small amounts of fluid.         History of Present Illness       38 y/o female presents with nausea and diarrhea. \"A lot\" of vomiting. Holding down water.  Has multiple episodes of diarrhea as well.  Denies any fevers, chills.  Denies any lightheadedness or dizziness as well.  States night before symptoms started 2 days ago she ate multiple hotdogs.  Denies any recent sick contacts or exposures.  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 Pertinent negatives include no chest pain, chills, coughing, dizziness, fever, headaches or myalgias.       Review of Systems   Review of Systems   Constitutional:  " Positive for appetite change. Negative for chills, fatigue and fever.   Respiratory:  Negative for cough, shortness of breath, wheezing and stridor.    Cardiovascular:  Negative for chest pain and palpitations.   Gastrointestinal:  Positive for abdominal pain, diarrhea, nausea and vomiting. Negative for constipation.   Musculoskeletal:  Negative for myalgias.   Neurological:  Negative for dizziness, syncope, light-headedness and headaches.         Current Medications       Current Outpatient Medications:   •  albuterol (2.5 mg/3 mL) 0.083 % nebulizer solution, Take 3 mL (2.5 mg total) by nebulization every 6 (six) hours as needed for wheezing or shortness of breath, Disp: 3 mL, Rfl: 1  •  albuterol (Ventolin HFA) 90 mcg/act inhaler, Inhale 2 puffs every 4 (four) hours as needed for wheezing or shortness of breath, Disp: 18 g, Rfl: 5  •  ARIPiprazole (ABILIFY) 10 mg tablet, Take 10 mg by mouth every morning, Disp: , Rfl:   •  clonazePAM (KlonoPIN) 0.5 mg tablet, Take 0.5 mg by mouth daily, Disp: , Rfl:   •  ondansetron (ZOFRAN) 8 mg tablet, Take 1 tablet (8 mg total) by mouth every 8 (eight) hours as needed for nausea or vomiting, Disp: 20 tablet, Rfl: 0  •  traZODone (DESYREL) 50 mg tablet, , Disp: , Rfl:   •  buPROPion (Wellbutrin SR) 150 mg 12 hr tablet, Take 1 tablet (150 mg total) by mouth 2 (two) times a day (Patient not taking: Reported on 7/11/2024), Disp: 180 tablet, Rfl: 1  •  chlorhexidine (PERIDEX) 0.12 % solution, Apply 15 mL to the mouth or throat 2 (two) times a day (Patient not taking: Reported on 7/11/2024), Disp: 120 mL, Rfl: 0  •  naproxen (NAPROSYN) 500 mg tablet, Take 1 tablet (500 mg total) by mouth 2 (two) times a day with meals for 7 days, Disp: 14 tablet, Rfl: 0  •  omeprazole (PriLOSEC) 20 mg delayed release capsule, TAKE 1 CAPSULE ORALLY IN THE MORNING 1 HOUR BEFORE 1ST MEAL OF THE DAY (Patient not taking: Reported on 7/11/2024), Disp: , Rfl:   •  ondansetron (ZOFRAN-ODT) 4 mg  disintegrating tablet, Take 1 tablet (4 mg total) by mouth every 6 (six) hours as needed for nausea or vomiting (Patient not taking: Reported on 7/11/2024), Disp: 15 tablet, Rfl: 0  •  pantoprazole (PROTONIX) 40 mg tablet, Take 1 tablet (40 mg total) by mouth daily On an empty stomach with water - 30 minutes prior to eating or drinking anything else, Disp: 56 tablet, Rfl: 0  •  propranolol (INDERAL LA) 80 mg 24 hr capsule, Take 1 capsule (80 mg total) by mouth daily (Patient not taking: Reported on 7/11/2024), Disp: 30 capsule, Rfl: 1  •  sucralfate (CARAFATE) 1 g tablet, Take 1 tablet (1 g total) by mouth 4 (four) times a day as needed (Abdominal pain) for up to 3 days, Disp: 12 tablet, Rfl: 0  •  SUMAtriptan (Imitrex) 25 mg tablet, Take 1 tablet (25 mg total) by mouth once as needed for migraine (Patient not taking: Reported on 7/11/2024), Disp: 10 tablet, Rfl: 0  •  varenicline (CHANTIX) 0.5 mg tablet, take 1 tablet by mouth daily for 1-3 DAYS then 1 tablet twice a day for DAYS 4-7 (Patient not taking: Reported on 7/11/2024), Disp: , Rfl:   No current facility-administered medications for this visit.    Current Allergies     Allergies as of 07/11/2024 - Reviewed 07/11/2024   Allergen Reaction Noted   • Medical tape Rash 08/19/2013            The following portions of the patient's history were reviewed and updated as appropriate: allergies, current medications, past family history, past medical history, past social history, past surgical history and problem list.     Past Medical History:   Diagnosis Date   • Anxiety    • Asthma    • Bipolar 1 disorder (HCC)    • Depression    • GERD (gastroesophageal reflux disease)    • Hypercholesterolemia        Past Surgical History:   Procedure Laterality Date   • CHOLECYSTECTOMY     • FL INJECTION RIGHT HIP (ARTHROGRAM)  11/23/2020   • FL INJECTION RIGHT HIP (NON ARTHROGRAM)  8/10/2020   • TONSILLECTOMY     • TUBAL LIGATION         Family History   Problem Relation Age of  "Onset   • Mental illness Mother    • Depression Mother    • Cancer Mother    • Hyperlipidemia Mother    • Asthma Father    • Arthritis Father    • Cancer Brother    • Psoriasis Daughter          Medications have been verified.        Objective   /80   Pulse 83   Temp 98.2 °F (36.8 °C)   Resp 20   Ht 5' 4\" (1.626 m)   Wt 104 kg (230 lb)   LMP 06/16/2024   SpO2 98%   BMI 39.48 kg/m²        Physical Exam     Physical Exam  Vitals and nursing note reviewed.   Constitutional:       General: She is not in acute distress.     Appearance: Normal appearance. She is normal weight. She is not ill-appearing, toxic-appearing or diaphoretic.   Cardiovascular:      Rate and Rhythm: Normal rate and regular rhythm.      Pulses: Normal pulses.      Heart sounds: Normal heart sounds. No murmur heard.     No friction rub. No gallop.   Pulmonary:      Effort: Pulmonary effort is normal. No respiratory distress.      Breath sounds: Normal breath sounds. No stridor. No wheezing, rhonchi or rales.   Chest:      Chest wall: No tenderness.   Abdominal:      General: Abdomen is flat. Bowel sounds are normal. There is no distension.      Palpations: Abdomen is soft. There is no mass.      Tenderness: There is generalized abdominal tenderness (Generalized abdominal tenderness). There is no right CVA tenderness, left CVA tenderness, guarding or rebound. Negative signs include Garzon's sign, Rovsing's sign, McBurney's sign, psoas sign and obturator sign.      Hernia: No hernia is present.      Comments: Able to sit up from lying down position without any difficulty   Neurological:      Mental Status: She is alert.                 "

## 2024-07-11 NOTE — LETTER
July 11, 2024     Patient: Genevieve Go   YOB: 1985   Date of Visit: 7/11/2024       To Whom it May Concern:    Genevieve Go was seen in my clinic on 7/11/2024. She may return to work on 07/15 .    If you have any questions or concerns, please don't hesitate to call.         Sincerely,          Rahat Romo PA-C        CC: No Recipients

## 2024-10-24 ENCOUNTER — HOSPITAL ENCOUNTER (EMERGENCY)
Facility: HOSPITAL | Age: 39
Discharge: HOME | End: 2024-10-24
Attending: EMERGENCY MEDICINE

## 2024-10-24 ENCOUNTER — APPOINTMENT (EMERGENCY)
Dept: CT IMAGING | Facility: HOSPITAL | Age: 39
End: 2024-10-24

## 2024-10-24 VITALS
HEIGHT: 64 IN | BODY MASS INDEX: 41.74 KG/M2 | RESPIRATION RATE: 18 BRPM | SYSTOLIC BLOOD PRESSURE: 116 MMHG | TEMPERATURE: 98.1 F | OXYGEN SATURATION: 95 % | HEART RATE: 62 BPM | WEIGHT: 244.49 LBS | DIASTOLIC BLOOD PRESSURE: 56 MMHG

## 2024-10-24 DIAGNOSIS — R10.9 ABDOMINAL PAIN: ICD-10-CM

## 2024-10-24 DIAGNOSIS — R11.2 NAUSEA AND VOMITING: Primary | ICD-10-CM

## 2024-10-24 DIAGNOSIS — G43.909 MIGRAINE: ICD-10-CM

## 2024-10-24 LAB
ALBUMIN SERPL BCG-MCNC: 4.3 G/DL (ref 3.5–5)
ALP SERPL-CCNC: 55 U/L (ref 34–104)
ALT SERPL W P-5'-P-CCNC: 19 U/L (ref 7–52)
ANION GAP SERPL CALCULATED.3IONS-SCNC: 8 MMOL/L (ref 4–13)
APTT PPP: 30 SECONDS (ref 23–34)
AST SERPL W P-5'-P-CCNC: 24 U/L (ref 13–39)
BASOPHILS # BLD AUTO: 0.04 THOUSANDS/ΜL (ref 0–0.1)
BASOPHILS NFR BLD AUTO: 1 % (ref 0–1)
BILIRUB SERPL-MCNC: 0.76 MG/DL (ref 0.2–1)
BILIRUB UR QL STRIP: ABNORMAL
BUN SERPL-MCNC: 5 MG/DL (ref 5–25)
CALCIUM SERPL-MCNC: 9.6 MG/DL (ref 8.4–10.2)
CHLORIDE SERPL-SCNC: 105 MMOL/L (ref 96–108)
CLARITY UR: CLEAR
CO2 SERPL-SCNC: 26 MMOL/L (ref 21–32)
COLOR UR: YELLOW
CREAT SERPL-MCNC: 0.54 MG/DL (ref 0.6–1.3)
EOSINOPHIL # BLD AUTO: 0.19 THOUSAND/ΜL (ref 0–0.61)
EOSINOPHIL NFR BLD AUTO: 2 % (ref 0–6)
ERYTHROCYTE [DISTWIDTH] IN BLOOD BY AUTOMATED COUNT: 12.5 % (ref 11.6–15.1)
EXT PREGNANCY TEST URINE: NEGATIVE
EXT. CONTROL: NORMAL
FLUAV AG UPPER RESP QL IA.RAPID: NEGATIVE
FLUBV AG UPPER RESP QL IA.RAPID: NEGATIVE
GFR SERPL CREATININE-BSD FRML MDRD: 120 ML/MIN/1.73SQ M
GLUCOSE SERPL-MCNC: 97 MG/DL (ref 65–140)
GLUCOSE UR STRIP-MCNC: NEGATIVE MG/DL
HCT VFR BLD AUTO: 45.6 % (ref 34.8–46.1)
HGB BLD-MCNC: 15.5 G/DL (ref 11.5–15.4)
HGB UR QL STRIP.AUTO: NEGATIVE
IMM GRANULOCYTES # BLD AUTO: 0.04 THOUSAND/UL (ref 0–0.2)
IMM GRANULOCYTES NFR BLD AUTO: 1 % (ref 0–2)
INR PPP: 0.95 (ref 0.85–1.19)
KETONES UR STRIP-MCNC: NEGATIVE MG/DL
LACTATE SERPL-SCNC: 0.9 MMOL/L (ref 0.5–2)
LEUKOCYTE ESTERASE UR QL STRIP: NEGATIVE
LIPASE SERPL-CCNC: 21 U/L (ref 11–82)
LYMPHOCYTES # BLD AUTO: 2.28 THOUSANDS/ΜL (ref 0.6–4.47)
LYMPHOCYTES NFR BLD AUTO: 28 % (ref 14–44)
MCH RBC QN AUTO: 28.8 PG (ref 26.8–34.3)
MCHC RBC AUTO-ENTMCNC: 34 G/DL (ref 31.4–37.4)
MCV RBC AUTO: 85 FL (ref 82–98)
MONOCYTES # BLD AUTO: 0.42 THOUSAND/ΜL (ref 0.17–1.22)
MONOCYTES NFR BLD AUTO: 5 % (ref 4–12)
NEUTROPHILS # BLD AUTO: 5.11 THOUSANDS/ΜL (ref 1.85–7.62)
NEUTS SEG NFR BLD AUTO: 63 % (ref 43–75)
NITRITE UR QL STRIP: NEGATIVE
NRBC BLD AUTO-RTO: 0 /100 WBCS
PH UR STRIP.AUTO: 6.5 [PH]
PLATELET # BLD AUTO: 303 THOUSANDS/UL (ref 149–390)
PMV BLD AUTO: 9.4 FL (ref 8.9–12.7)
POTASSIUM SERPL-SCNC: 3.5 MMOL/L (ref 3.5–5.3)
PROT SERPL-MCNC: 7.5 G/DL (ref 6.4–8.4)
PROT UR STRIP-MCNC: NEGATIVE MG/DL
PROTHROMBIN TIME: 13.1 SECONDS (ref 12.3–15)
RBC # BLD AUTO: 5.38 MILLION/UL (ref 3.81–5.12)
SARS-COV+SARS-COV-2 AG RESP QL IA.RAPID: NEGATIVE
SODIUM SERPL-SCNC: 139 MMOL/L (ref 135–147)
SP GR UR STRIP.AUTO: 1.01 (ref 1–1.03)
UROBILINOGEN UR QL STRIP.AUTO: 0.2 E.U./DL
WBC # BLD AUTO: 8.08 THOUSAND/UL (ref 4.31–10.16)

## 2024-10-24 PROCEDURE — 85610 PROTHROMBIN TIME: CPT | Performed by: EMERGENCY MEDICINE

## 2024-10-24 PROCEDURE — 80053 COMPREHEN METABOLIC PANEL: CPT | Performed by: EMERGENCY MEDICINE

## 2024-10-24 PROCEDURE — 96361 HYDRATE IV INFUSION ADD-ON: CPT

## 2024-10-24 PROCEDURE — 99284 EMERGENCY DEPT VISIT MOD MDM: CPT

## 2024-10-24 PROCEDURE — 87811 SARS-COV-2 COVID19 W/OPTIC: CPT | Performed by: EMERGENCY MEDICINE

## 2024-10-24 PROCEDURE — 96374 THER/PROPH/DIAG INJ IV PUSH: CPT

## 2024-10-24 PROCEDURE — 87804 INFLUENZA ASSAY W/OPTIC: CPT | Performed by: EMERGENCY MEDICINE

## 2024-10-24 PROCEDURE — 85025 COMPLETE CBC W/AUTO DIFF WBC: CPT | Performed by: EMERGENCY MEDICINE

## 2024-10-24 PROCEDURE — 83690 ASSAY OF LIPASE: CPT | Performed by: EMERGENCY MEDICINE

## 2024-10-24 PROCEDURE — 81003 URINALYSIS AUTO W/O SCOPE: CPT | Performed by: EMERGENCY MEDICINE

## 2024-10-24 PROCEDURE — 74176 CT ABD & PELVIS W/O CONTRAST: CPT

## 2024-10-24 PROCEDURE — 96375 TX/PRO/DX INJ NEW DRUG ADDON: CPT

## 2024-10-24 PROCEDURE — 99285 EMERGENCY DEPT VISIT HI MDM: CPT | Performed by: EMERGENCY MEDICINE

## 2024-10-24 PROCEDURE — 85730 THROMBOPLASTIN TIME PARTIAL: CPT | Performed by: EMERGENCY MEDICINE

## 2024-10-24 PROCEDURE — 36415 COLL VENOUS BLD VENIPUNCTURE: CPT | Performed by: EMERGENCY MEDICINE

## 2024-10-24 PROCEDURE — 83605 ASSAY OF LACTIC ACID: CPT | Performed by: EMERGENCY MEDICINE

## 2024-10-24 PROCEDURE — 81025 URINE PREGNANCY TEST: CPT | Performed by: EMERGENCY MEDICINE

## 2024-10-24 RX ORDER — ONDANSETRON 2 MG/ML
4 INJECTION INTRAMUSCULAR; INTRAVENOUS ONCE
Status: COMPLETED | OUTPATIENT
Start: 2024-10-24 | End: 2024-10-24

## 2024-10-24 RX ORDER — DIPHENHYDRAMINE HYDROCHLORIDE 50 MG/ML
25 INJECTION INTRAMUSCULAR; INTRAVENOUS ONCE
Status: COMPLETED | OUTPATIENT
Start: 2024-10-24 | End: 2024-10-24

## 2024-10-24 RX ORDER — KETOROLAC TROMETHAMINE 30 MG/ML
30 INJECTION, SOLUTION INTRAMUSCULAR; INTRAVENOUS ONCE
Status: COMPLETED | OUTPATIENT
Start: 2024-10-24 | End: 2024-10-24

## 2024-10-24 RX ORDER — MORPHINE SULFATE 4 MG/ML
4 INJECTION, SOLUTION INTRAMUSCULAR; INTRAVENOUS ONCE
Status: COMPLETED | OUTPATIENT
Start: 2024-10-24 | End: 2024-10-24

## 2024-10-24 RX ADMIN — ONDANSETRON 4 MG: 2 INJECTION INTRAMUSCULAR; INTRAVENOUS at 01:33

## 2024-10-24 RX ADMIN — SODIUM CHLORIDE 1000 ML: 0.9 INJECTION, SOLUTION INTRAVENOUS at 01:32

## 2024-10-24 RX ADMIN — KETOROLAC TROMETHAMINE 30 MG: 30 INJECTION, SOLUTION INTRAMUSCULAR; INTRAVENOUS at 01:32

## 2024-10-24 RX ADMIN — DIPHENHYDRAMINE HYDROCHLORIDE 25 MG: 50 INJECTION, SOLUTION INTRAMUSCULAR; INTRAVENOUS at 01:33

## 2024-10-24 RX ADMIN — MORPHINE SULFATE 4 MG: 4 INJECTION INTRAVENOUS at 03:09

## 2024-10-24 NOTE — ED PROVIDER NOTES
Time reflects when diagnosis was documented in both MDM as applicable and the Disposition within this note       Time User Action Codes Description Comment    10/24/2024  2:28 AM Williams Younger Add [R11.2] Nausea and vomiting     10/24/2024  2:28 AM Williams Younger Add [R10.9] Abdominal pain     10/24/2024  2:36 AM Williams Younger Add [G43.909] Migraine           ED Disposition       ED Disposition   Discharge    Condition   Stable    Date/Time   u Oct 24, 2024  3:09 AM    Comment   Genevieve Go discharge to home/self care.                   Assessment & Plan       Medical Decision Making  Differential diagnosis included but not limited to migraine headache upper respiratory infection COVID flu viral syndrome colitis diverticulitis pancreatitis.  Patient remained clinically and hemodynamically stable in the emergency department she is afebrile nontoxic well-appearing felt improved after rest fluids and meds given the ED. workup in the ED reveals no evidence of acute intra-abdominal pathology or bacterial infection present.  Suspect viral syndrome and migraine headache with nausea vomiting and abdominal pain.  Symptoms improved with rest fluids and meds given in the emergency department. for now we will recommend over-the-counter meds plenty of fluids and supportive care and prompt follow-up with primary physician for further evaluation and treatment and obtain test results. return precautions and anticipatory guidance discussed.      Problems Addressed:  Abdominal pain: acute illness or injury  Migraine: acute illness or injury  Nausea and vomiting: acute illness or injury    Amount and/or Complexity of Data Reviewed  Labs: ordered. Decision-making details documented in ED Course.  Radiology: ordered. Decision-making details documented in ED Course.    Risk  Prescription drug management.             Medications   sodium chloride 0.9 % bolus 1,000 mL (0 mL Intravenous Stopped 10/24/24 3455)   ketorolac (TORADOL)  injection 30 mg (30 mg Intravenous Given 10/24/24 0132)   diphenhydrAMINE (BENADRYL) injection 25 mg (25 mg Intravenous Given 10/24/24 0133)   ondansetron (ZOFRAN) injection 4 mg (4 mg Intravenous Given 10/24/24 0133)   morphine injection 4 mg (4 mg Intravenous Given 10/24/24 0309)       ED Risk Strat Scores                                               History of Present Illness       Chief Complaint   Patient presents with    Vomiting     Pt been complaining of vomiting for past two days, pt also complaining of a migraine. Pt also having diarrhea. Pt possibly exposed to covid. Taking tylenol with no relief       Past Medical History:   Diagnosis Date    Anxiety     Asthma     Bipolar 1 disorder (HCC)     Depression     GERD (gastroesophageal reflux disease)     Hypercholesterolemia       Past Surgical History:   Procedure Laterality Date    CHOLECYSTECTOMY      FL INJECTION RIGHT HIP (ARTHROGRAM)  11/23/2020    FL INJECTION RIGHT HIP (NON ARTHROGRAM)  8/10/2020    TONSILLECTOMY      TUBAL LIGATION        Family History   Problem Relation Age of Onset    Mental illness Mother     Depression Mother     Cancer Mother     Hyperlipidemia Mother     Asthma Father     Arthritis Father     Cancer Brother     Psoriasis Daughter       Social History     Tobacco Use    Smoking status: Former     Current packs/day: 0.50     Types: Cigarettes    Smokeless tobacco: Never   Vaping Use    Vaping status: Every Day    Substances: Nicotine, THC, Flavoring   Substance Use Topics    Alcohol use: Not Currently    Drug use: Yes     Types: Marijuana     Comment: smokes marijuana once per month      E-Cigarette/Vaping    E-Cigarette Use Current Every Day User       E-Cigarette/Vaping Substances    Nicotine Yes     THC Yes     CBD No     Flavoring Yes     Other No     Unknown No       I have reviewed and agree with the history as documented.     Patient is a 39-year-old female presents emergency department complaining of abdominal pain  nausea vomiting and not feeling well for about 2 days also has headache and subjective fevers and mild cough.        History provided by:  Patient  Vomiting  Severity:  Moderate  Duration:  2 days  Associated symptoms: abdominal pain, chills, cough, fever and headaches    Associated symptoms: no diarrhea        Review of Systems   Constitutional:  Positive for chills and fever.   HENT:  Positive for congestion.    Respiratory:  Positive for cough. Negative for shortness of breath.    Cardiovascular:  Negative for chest pain.   Gastrointestinal:  Positive for abdominal pain, nausea and vomiting. Negative for diarrhea.   Neurological:  Positive for headaches. Negative for weakness and numbness.   All other systems reviewed and are negative.          Objective       ED Triage Vitals [10/24/24 0117]   Temperature Pulse Blood Pressure Respirations SpO2 Patient Position - Orthostatic VS   98.1 °F (36.7 °C) 79 129/68 18 99 % Sitting      Temp Source Heart Rate Source BP Location FiO2 (%) Pain Score    Temporal Monitor Left arm -- 8      Vitals      Date and Time Temp Pulse SpO2 Resp BP Pain Score FACES Pain Rating User   10/24/24 0309 -- -- -- -- -- 7 -- BA   10/24/24 0132 -- -- -- -- -- 7 --    10/24/24 0117 98.1 °F (36.7 °C) 79 99 % 18 129/68 8 -- SR            Physical Exam  Vitals and nursing note reviewed.   Constitutional:       General: She is not in acute distress.     Appearance: Normal appearance.   HENT:      Head: Normocephalic and atraumatic.      Nose: Nose normal.   Eyes:      Conjunctiva/sclera: Conjunctivae normal.   Cardiovascular:      Rate and Rhythm: Normal rate and regular rhythm.   Pulmonary:      Effort: Pulmonary effort is normal. No respiratory distress.      Breath sounds: Normal breath sounds.   Abdominal:      Palpations: Abdomen is soft.      Tenderness: There is abdominal tenderness in the right lower quadrant, suprapubic area and left lower quadrant. There is no guarding or rebound.    Skin:     General: Skin is dry.   Neurological:      General: No focal deficit present.      Mental Status: She is alert and oriented to person, place, and time.         Results Reviewed       Procedure Component Value Units Date/Time    FLU/COVID Rapid Antigen (30 min. TAT) - Preferred screening test in ED [[id number]]  (Normal) Collected: 10/24/24 0127    Lab Status: Final result Specimen: Nares from Nose Updated: 10/24/24 0201     SARS COV Rapid Antigen Negative     Influenza A Rapid Antigen Negative     Influenza B Rapid Antigen Negative    Narrative:      This test has been performed using the LUMOback July 2 FLU+SARS Antigen test under the Emergency Use Authorization (EUA). This test has been validated by the  and verified by the performing laboratory. The July uses lateral flow immunofluorescent sandwich assay to detect SARS-COV, Influenza A and Influenza B Antigen.     The [employer]idel July 2 SARS Antigen test does not differentiate between SARS-CoV and SARS-CoV-2.     Negative results are presumptive and may be confirmed with a molecular assay, if necessary, for patient management. Negative results do not rule out SARS-CoV-2 or influenza infection and should not be used as the sole basis for treatment or patient management decisions. A negative test result may occur if the level of antigen in a sample is below the limit of detection of this test.     Positive results are indicative of the presence of viral antigens, but do not rule out bacterial infection or co-infection with other viruses.     All test results should be used as an adjunct to clinical observations and other information available to the provider.    FOR PEDIATRIC PATIENTS - copy/paste COVID Guidelines URL to browser: [email or web address]    Lactic acid, plasma (w/reflex if result > 2.0) [[id number]]  (Normal) Collected: 10/24/24 0127    Lab Status: Final result Specimen: Blood from  Arm, Right Updated: 10/24/24 0201     LACTIC ACID 0.9 mmol/L     Narrative:      Result may be elevated if tourniquet was used during collection.    Comprehensive metabolic panel [[id number]]  (Abnormal) Collected: 10/24/24 0127    Lab Status: Final result Specimen: Blood from Arm, Right Updated: 10/24/24 0200     Sodium 139 mmol/L      Potassium 3.5 mmol/L      Chloride 105 mmol/L      CO2 26 mmol/L      ANION GAP 8 mmol/L      BUN 5 mg/dL      Creatinine 0.54 mg/dL      Glucose 97 mg/dL      Calcium 9.6 mg/dL      AST 24 U/L      ALT 19 U/L      Alkaline Phosphatase 55 U/L      Total Protein 7.5 g/dL      Albumin 4.3 g/dL      Total Bilirubin 0.76 mg/dL      eGFR 120 ml/min/1.73sq m     Narrative:      National Kidney Disease Foundation guidelines for Chronic Kidney Disease (CKD):     Stage 1 with normal or high GFR (GFR > 90 mL/min/1.73 square meters)    Stage 2 Mild CKD (GFR = 60-89 mL/min/1.73 square meters)    Stage 3A Moderate CKD (GFR = 45-59 mL/min/1.73 square meters)    Stage 3B Moderate CKD (GFR = 30-44 mL/min/1.73 square meters)    Stage 4 Severe CKD (GFR = 15-29 mL/min/1.73 square meters)    Stage 5 End Stage CKD (GFR <15 mL/min/1.73 square meters)  Note: GFR calculation is accurate only with a steady state creatinine    Lipase [[id number]]  (Normal) Collected: 10/24/24 0127    Lab Status: Final result Specimen: Blood from Arm, Right Updated: 10/24/24 0200     Lipase 21 u/L     Protime-INR [[id number]]  (Normal) Collected: 10/24/24 0127    Lab Status: Final result Specimen: Blood from Arm, Right Updated: 10/24/24 0157     Protime 13.1 seconds      INR 0.95    Narrative:      INR Therapeutic Range    Indication                                             INR Range      Atrial Fibrillation                                               2.0-3.0  Hypercoagulable State                                    2.0.2.3  Left Ventricular Asist Device                            2.0-3.0  Mechanical Heart Valve                                   -    Aortic(with afib, MI, embolism, HF, LA enlargement,    and/or coagulopathy)                                     2.0-3.0 (2.5-3.5)     Mitral                                                             2.5-3.5  Prosthetic/Bioprosthetic Heart Valve               2.0-3.0  Venous thromboembolism (VTE: VT, PE        2.0-3.0    APTT [[id number]]  (Normal) Collected: 10/24/24 0127    Lab Status: Final result Specimen: Blood from Arm, Right Updated: 10/24/24 0157     PTT 30 seconds     UA w Reflex to Microscopic w Reflex to Culture [[id number]]  (Abnormal) Collected: 10/24/24 0131    Lab Status: Final result Specimen: Urine, Clean Catch Updated: 10/24/24 0146     Color, UA Yellow     Clarity, UA Clear     Specific Gravity, UA 1.015     pH, UA 6.5     Leukocytes, UA Negative     Nitrite, UA Negative     Protein, UA Negative mg/dl      Glucose, UA Negative mg/dl      Ketones, UA Negative mg/dl      Urobilinogen, UA 0.2 E.U./dl      Bilirubin, UA Small     Occult Blood, UA Negative    CBC and differential [[id number]]  (Abnormal) Collected: 10/24/24 0127    Lab Status: Final result Specimen: Blood from Arm, Right Updated: 10/24/24 0145     WBC 8.08 Thousand/uL      RBC 5.38 Million/uL      Hemoglobin 15.5 g/dL      Hematocrit 45.6 %      MCV 85 fL      MCH 28.8 pg      MCHC 34.0 g/dL      RDW 12.5 %      MPV 9.4 fL      Platelets 303 Thousands/uL      nRBC 0 /100 WBCs      Segmented % 63 %      Immature Grans % 1 %      Lymphocytes % 28 %      Monocytes % 5 %      Eosinophils Relative 2 %      Basophils Relative 1 %      Absolute Neutrophils 5.11 Thousands/µL      Absolute Immature Grans 0.04 Thousand/uL      Absolute Lymphocytes 2.28 Thousands/µL      Absolute Monocytes 0.42 Thousand/µL      Eosinophils Absolute 0.19 Thousand/µL      Basophils Absolute 0.04 Thousands/µL     POCT pregnancy, urine [[id number]]  (Normal) Resulted: 10/24/24 0131    Lab Status: Final result Updated: 10/24/24 0131     EXT Preg  Test, Ur Negative     Control Valid            CT abdomen pelvis wo contrast   Final Interpretation by Patrick Lewis MD (10/24 0255)      No acute intra-abdominal/pelvic abnormalities noted on limited noncontrast evaluation with findings detailed above.      Workstation performed: [id number]             Procedures    ED Medication and Procedure Management   Prior to Admission Medications   Prescriptions Last Dose Informant Patient Reported? Taking?   ARIPiprazole (ABILIFY) 10 mg tablet   Yes No   Sig: Take 10 mg by mouth every morning   SUMAtriptan (Imitrex) 25 mg tablet   No No   Sig: Take 1 tablet (25 mg total) by mouth once as needed for migraine   Patient not taking: Reported on 7/11/2024   albuterol (2.5 mg/3 mL) 0.083 % nebulizer solution   No No   Sig: Take 3 mL (2.5 mg total) by nebulization every 6 (six) hours as needed for wheezing or shortness of breath   albuterol (Ventolin HFA) 90 mcg/act inhaler   No No   Sig: Inhale 2 puffs every 4 (four) hours as needed for wheezing or shortness of breath   buPROPion (Wellbutrin SR) 150 mg 12 hr tablet   No No   Sig: Take 1 tablet (150 mg total) by mouth 2 (two) times a day   Patient not taking: Reported on 7/11/2024   chlorhexidine (PERIDEX) 0.12 % solution   No No   Sig: Apply 15 mL to the mouth or throat 2 (two) times a day   Patient not taking: Reported on 7/11/2024   clonazePAM (KlonoPIN) 0.5 mg tablet   Yes No   Sig: Take 0.5 mg by mouth daily   naproxen (NAPROSYN) 500 mg tablet   No No   Sig: Take 1 tablet (500 mg total) by mouth 2 (two) times a day with meals for 7 days   omeprazole (PriLOSEC) 20 mg delayed release capsule   Yes No   Sig: TAKE 1 CAPSULE ORALLY IN THE MORNING 1 HOUR BEFORE 1ST MEAL OF THE DAY   Patient not taking: Reported on 7/11/2024   ondansetron (ZOFRAN) 8 mg tablet   No No   Sig: Take 1 tablet (8 mg total) by mouth every 8 (eight) hours as needed for nausea or vomiting   ondansetron (ZOFRAN-ODT) 4 mg disintegrating tablet   No No   Sig:  Take 1 tablet (4 mg total) by mouth every 6 (six) hours as needed for nausea or vomiting   Patient not taking: Reported on 7/11/2024   pantoprazole (PROTONIX) 40 mg tablet   No No   Sig: Take 1 tablet (40 mg total) by mouth daily On an empty stomach with water - 30 minutes prior to eating or drinking anything else   propranolol (INDERAL LA) 80 mg 24 hr capsule   No No   Sig: Take 1 capsule (80 mg total) by mouth daily   Patient not taking: Reported on 7/11/2024   sucralfate (CARAFATE) 1 g tablet   No No   Sig: Take 1 tablet (1 g total) by mouth 4 (four) times a day as needed (Abdominal pain) for up to 3 days   traZODone (DESYREL) 50 mg tablet   Yes No   varenicline (CHANTIX) 0.5 mg tablet   Yes No   Sig: take 1 tablet by mouth daily for 1-3 DAYS then 1 tablet twice a day for DAYS 4-7   Patient not taking: Reported on 7/11/2024      Facility-Administered Medications: None     Patient's Medications   Discharge Prescriptions    No medications on file     No discharge procedures on file.  ED SEPSIS DOCUMENTATION   Time reflects when diagnosis was documented in both MDM as applicable and the Disposition within this note       Time User Action Codes Description Comment    10/24/2024  2:28 AM Williams Younger [R11.2] Nausea and vomiting     10/24/2024  2:28 AM Williams Younger [R10.9] Abdominal pain     10/24/2024  2:36 AM Williams Younger [G43.909] Migraine                  Williams Younger DO  10/24/24 0311

## 2024-10-24 NOTE — Clinical Note
Genevieve Go was seen and treated in our emergency department on 10/24/2024.            off work today and tomorrow    Diagnosis:     Genevieve  .    She may return on this date:          If you have any questions or concerns, please don't hesitate to call.      Williams Younger, DO    ______________________________           _______________          _______________  Hospital Representative                              Date                                Time

## 2024-12-09 ENCOUNTER — HOSPITAL ENCOUNTER (EMERGENCY)
Facility: HOSPITAL | Age: 39
Discharge: HOME | End: 2024-12-09
Attending: EMERGENCY MEDICINE | Admitting: EMERGENCY MEDICINE

## 2024-12-09 VITALS
SYSTOLIC BLOOD PRESSURE: 100 MMHG | OXYGEN SATURATION: 97 % | DIASTOLIC BLOOD PRESSURE: 55 MMHG | WEIGHT: 253.75 LBS | BODY MASS INDEX: 43.56 KG/M2 | TEMPERATURE: 97.3 F | HEART RATE: 68 BPM | RESPIRATION RATE: 16 BRPM

## 2024-12-09 DIAGNOSIS — G43.909 MIGRAINE HEADACHE: Primary | ICD-10-CM

## 2024-12-09 DIAGNOSIS — R11.2 NAUSEA VOMITING AND DIARRHEA: ICD-10-CM

## 2024-12-09 DIAGNOSIS — B34.9 VIRAL SYNDROME: ICD-10-CM

## 2024-12-09 DIAGNOSIS — E83.42 HYPOMAGNESEMIA: ICD-10-CM

## 2024-12-09 DIAGNOSIS — R19.7 NAUSEA VOMITING AND DIARRHEA: ICD-10-CM

## 2024-12-09 DIAGNOSIS — E87.6 HYPOKALEMIA: ICD-10-CM

## 2024-12-09 LAB
ALBUMIN SERPL BCG-MCNC: 4 G/DL (ref 3.5–5)
ALP SERPL-CCNC: 45 U/L (ref 34–104)
ALT SERPL W P-5'-P-CCNC: 10 U/L (ref 7–52)
ANION GAP SERPL CALCULATED.3IONS-SCNC: 7 MMOL/L (ref 4–13)
APTT PPP: 29 SECONDS (ref 23–34)
AST SERPL W P-5'-P-CCNC: 11 U/L (ref 13–39)
BASOPHILS # BLD AUTO: 0.04 THOUSANDS/ÂΜL (ref 0–0.1)
BASOPHILS NFR BLD AUTO: 0 % (ref 0–1)
BILIRUB SERPL-MCNC: 0.37 MG/DL (ref 0.2–1)
BUN SERPL-MCNC: 8 MG/DL (ref 5–25)
CALCIUM SERPL-MCNC: 8.9 MG/DL (ref 8.4–10.2)
CHLORIDE SERPL-SCNC: 106 MMOL/L (ref 96–108)
CO2 SERPL-SCNC: 25 MMOL/L (ref 21–32)
CREAT SERPL-MCNC: 0.58 MG/DL (ref 0.6–1.3)
EOSINOPHIL # BLD AUTO: 0.35 THOUSAND/ÂΜL (ref 0–0.61)
EOSINOPHIL NFR BLD AUTO: 4 % (ref 0–6)
ERYTHROCYTE [DISTWIDTH] IN BLOOD BY AUTOMATED COUNT: 13.2 % (ref 11.6–15.1)
GFR SERPL CREATININE-BSD FRML MDRD: 117 ML/MIN/1.73SQ M
GLUCOSE SERPL-MCNC: 99 MG/DL (ref 65–140)
HCT VFR BLD AUTO: 39.8 % (ref 34.8–46.1)
HGB BLD-MCNC: 13.3 G/DL (ref 11.5–15.4)
IMM GRANULOCYTES # BLD AUTO: 0.05 THOUSAND/UL (ref 0–0.2)
IMM GRANULOCYTES NFR BLD AUTO: 1 % (ref 0–2)
INR PPP: 0.94 (ref 0.85–1.19)
LACTATE SERPL-SCNC: 1.3 MMOL/L (ref 0.5–2)
LIPASE SERPL-CCNC: 26 U/L (ref 11–82)
LYMPHOCYTES # BLD AUTO: 3.78 THOUSANDS/ÂΜL (ref 0.6–4.47)
LYMPHOCYTES NFR BLD AUTO: 39 % (ref 14–44)
MAGNESIUM SERPL-MCNC: 1.8 MG/DL (ref 1.9–2.7)
MCH RBC QN AUTO: 29.4 PG (ref 26.8–34.3)
MCHC RBC AUTO-ENTMCNC: 33.4 G/DL (ref 31.4–37.4)
MCV RBC AUTO: 88 FL (ref 82–98)
MONOCYTES # BLD AUTO: 0.6 THOUSAND/ÂΜL (ref 0.17–1.22)
MONOCYTES NFR BLD AUTO: 6 % (ref 4–12)
NEUTROPHILS # BLD AUTO: 4.94 THOUSANDS/ÂΜL (ref 1.85–7.62)
NEUTS SEG NFR BLD AUTO: 50 % (ref 43–75)
NRBC BLD AUTO-RTO: 0 /100 WBCS
PLATELET # BLD AUTO: 255 THOUSANDS/UL (ref 149–390)
PMV BLD AUTO: 9.9 FL (ref 8.9–12.7)
POTASSIUM SERPL-SCNC: 3.2 MMOL/L (ref 3.5–5.3)
PROT SERPL-MCNC: 7.1 G/DL (ref 6.4–8.4)
PROTHROMBIN TIME: 13 SECONDS (ref 12.3–15)
RBC # BLD AUTO: 4.52 MILLION/UL (ref 3.81–5.12)
SODIUM SERPL-SCNC: 138 MMOL/L (ref 135–147)
WBC # BLD AUTO: 9.76 THOUSAND/UL (ref 4.31–10.16)

## 2024-12-09 PROCEDURE — 96374 THER/PROPH/DIAG INJ IV PUSH: CPT

## 2024-12-09 PROCEDURE — 83735 ASSAY OF MAGNESIUM: CPT | Performed by: EMERGENCY MEDICINE

## 2024-12-09 PROCEDURE — 96375 TX/PRO/DX INJ NEW DRUG ADDON: CPT

## 2024-12-09 PROCEDURE — 36415 COLL VENOUS BLD VENIPUNCTURE: CPT | Performed by: EMERGENCY MEDICINE

## 2024-12-09 PROCEDURE — 99284 EMERGENCY DEPT VISIT MOD MDM: CPT | Performed by: EMERGENCY MEDICINE

## 2024-12-09 PROCEDURE — 99284 EMERGENCY DEPT VISIT MOD MDM: CPT

## 2024-12-09 PROCEDURE — 96361 HYDRATE IV INFUSION ADD-ON: CPT

## 2024-12-09 PROCEDURE — 83690 ASSAY OF LIPASE: CPT | Performed by: EMERGENCY MEDICINE

## 2024-12-09 PROCEDURE — 83605 ASSAY OF LACTIC ACID: CPT | Performed by: EMERGENCY MEDICINE

## 2024-12-09 PROCEDURE — 80053 COMPREHEN METABOLIC PANEL: CPT | Performed by: EMERGENCY MEDICINE

## 2024-12-09 PROCEDURE — 85610 PROTHROMBIN TIME: CPT | Performed by: EMERGENCY MEDICINE

## 2024-12-09 PROCEDURE — 85025 COMPLETE CBC W/AUTO DIFF WBC: CPT | Performed by: EMERGENCY MEDICINE

## 2024-12-09 PROCEDURE — 85730 THROMBOPLASTIN TIME PARTIAL: CPT | Performed by: EMERGENCY MEDICINE

## 2024-12-09 RX ORDER — ONDANSETRON 4 MG/1
4 TABLET, ORALLY DISINTEGRATING ORAL EVERY 6 HOURS PRN
Qty: 12 TABLET | Refills: 0 | Status: SHIPPED | OUTPATIENT
Start: 2024-12-09

## 2024-12-09 RX ORDER — METOCLOPRAMIDE HYDROCHLORIDE 5 MG/ML
10 INJECTION INTRAMUSCULAR; INTRAVENOUS ONCE
Status: COMPLETED | OUTPATIENT
Start: 2024-12-09 | End: 2024-12-09

## 2024-12-09 RX ORDER — POTASSIUM CHLORIDE 1500 MG/1
40 TABLET, EXTENDED RELEASE ORAL ONCE
Status: COMPLETED | OUTPATIENT
Start: 2024-12-09 | End: 2024-12-09

## 2024-12-09 RX ORDER — DIPHENHYDRAMINE HYDROCHLORIDE 50 MG/ML
25 INJECTION INTRAMUSCULAR; INTRAVENOUS ONCE
Status: COMPLETED | OUTPATIENT
Start: 2024-12-09 | End: 2024-12-09

## 2024-12-09 RX ORDER — KETOROLAC TROMETHAMINE 30 MG/ML
30 INJECTION, SOLUTION INTRAMUSCULAR; INTRAVENOUS ONCE
Status: COMPLETED | OUTPATIENT
Start: 2024-12-09 | End: 2024-12-09

## 2024-12-09 RX ADMIN — SODIUM CHLORIDE 1000 ML: 0.9 INJECTION, SOLUTION INTRAVENOUS at 21:25

## 2024-12-09 RX ADMIN — DIPHENHYDRAMINE HYDROCHLORIDE 25 MG: 50 INJECTION, SOLUTION INTRAMUSCULAR; INTRAVENOUS at 21:25

## 2024-12-09 RX ADMIN — METOCLOPRAMIDE HYDROCHLORIDE 10 MG: 5 INJECTION INTRAMUSCULAR; INTRAVENOUS at 21:27

## 2024-12-09 RX ADMIN — POTASSIUM CHLORIDE 40 MEQ: 1500 TABLET, EXTENDED RELEASE ORAL at 21:59

## 2024-12-09 RX ADMIN — Medication 500 MG: at 22:06

## 2024-12-09 RX ADMIN — KETOROLAC TROMETHAMINE 30 MG: 30 INJECTION, SOLUTION INTRAMUSCULAR at 21:27

## 2024-12-09 NOTE — Clinical Note
Genevieve Go was seen and treated in our emergency department on 12/9/2024.            Off work 12/9/2024 through 12/10/2024    Diagnosis:     Genevieve  .    She may return on this date:          If you have any questions or concerns, please don't hesitate to call.      Williams Younger, DO    ______________________________           _______________          _______________  Hospital Representative                              Date                                Time

## 2024-12-10 NOTE — ED PROVIDER NOTES
Time reflects when diagnosis was documented in both MDM as applicable and the Disposition within this note       Time User Action Codes Description Comment    12/9/2024  9:57 PM Williams Younger Add [G43.909] Migraine headache     12/9/2024  9:57 PM Williams Younger Add [R11.2,  R19.7] Nausea vomiting and diarrhea     12/9/2024  9:57 PM Williams Younger Add [B34.9] Viral syndrome     12/9/2024  9:57 PM Williams Younger Add [E83.42] Hypomagnesemia     12/9/2024  9:57 PM Williams Younger Add [E87.6] Hypokalemia           ED Disposition       ED Disposition   Discharge    Condition   Stable    Date/Time   Mon Dec 9, 2024  9:57 PM    Comment   Genevieve Go discharge to home/self care.                   Assessment & Plan       Medical Decision Making  Differential diagnosis included but not limited to viral enteritis migraine headache dehydration electrolyte derangement.  Patient remained clinically and hemodynamically stable in the emergency department she is afebrile nontoxic well-appearing benign nontender abdominal exam no active abdominal pain at this time on evaluation.  Symptoms and history consistent with viral enteritis and associated migraine headache with mild hypokalemia and hypomagnesemia noted and treated in the ED with oral replacements.  After migraine cocktail and fluids given in the ED patient felt much improved headache was resolved no further nausea. for now recommended plenty of fluids such as Gatorade to help with electrolytes and antiemetics as needed for nausea and vomiting and prompt follow-up with primary physician for further evaluation and treatment return precautions and anticipatory guidance discussed.      Problems Addressed:  Hypokalemia: acute illness or injury  Hypomagnesemia: acute illness or injury  Migraine headache: acute illness or injury  Nausea vomiting and diarrhea: acute illness or injury  Viral syndrome: acute illness or injury    Amount and/or Complexity of Data Reviewed  Labs:  "ordered. Decision-making details documented in ED Course.    Risk  OTC drugs.  Prescription drug management.             Medications   magnesium gluconate (MAGONATE) tablet 500 mg (has no administration in time range)   sodium chloride 0.9 % bolus 1,000 mL (0 mL Intravenous Stopped 12/9/24 2157)   ketorolac (TORADOL) injection 30 mg (30 mg Intravenous Given 12/9/24 2127)   diphenhydrAMINE (BENADRYL) injection 25 mg (25 mg Intravenous Given 12/9/24 2125)   metoclopramide (REGLAN) injection 10 mg (10 mg Intravenous Given 12/9/24 2127)   potassium chloride (Klor-Con M20) CR tablet 40 mEq (40 mEq Oral Given 12/9/24 2159)       ED Risk Strat Scores                           SBIRT 22yo+      Flowsheet Row Most Recent Value   Initial Alcohol Screen: US AUDIT-C     1. How often do you have a drink containing alcohol? 0 Filed at: 12/09/2024 2108   2. How many drinks containing alcohol do you have on a typical day you are drinking?  0 Filed at: 12/09/2024 2108   3b. FEMALE Any Age, or MALE 65+: How often do you have 4 or more drinks on one occassion? 0 Filed at: 12/09/2024 2108   Audit-C Score 0 Filed at: 12/09/2024 2108   YINKA: How many times in the past year have you...    Used an illegal drug or used a prescription medication for non-medical reasons? Never Filed at: 12/09/2024 2108                            History of Present Illness       Chief Complaint   Patient presents with    Vomiting     N/v/d abd pain, chills, migraine for the last 3 days. Hx of migraines, not medicated. Denies sensitivities to sound or light, blurry vision. Had a temp of 100.4 the \"other night\". Taking otc cold medication for s/s.        Past Medical History:   Diagnosis Date    Anxiety     Asthma     Bipolar 1 disorder (HCC)     Depression     GERD (gastroesophageal reflux disease)     Hypercholesterolemia     Migraines       Past Surgical History:   Procedure Laterality Date    CHOLECYSTECTOMY      FL INJECTION RIGHT HIP (ARTHROGRAM)  " 11/23/2020    FL INJECTION RIGHT HIP (NON ARTHROGRAM)  8/10/2020    TONSILLECTOMY      TUBAL LIGATION        Family History   Problem Relation Age of Onset    Mental illness Mother     Depression Mother     Cancer Mother     Hyperlipidemia Mother     Asthma Father     Arthritis Father     Cancer Brother     Psoriasis Daughter       Social History     Tobacco Use    Smoking status: Former     Current packs/day: 0.50     Types: Cigarettes    Smokeless tobacco: Never   Vaping Use    Vaping status: Every Day    Substances: Nicotine, THC, Flavoring   Substance Use Topics    Alcohol use: Not Currently    Drug use: Yes     Types: Marijuana     Comment: smokes marijuana once per month      E-Cigarette/Vaping    E-Cigarette Use Current Every Day User       E-Cigarette/Vaping Substances    Nicotine Yes     THC Yes     CBD No     Flavoring Yes     Other No     Unknown No       I have reviewed and agree with the history as documented.     Patient is a 39-year-old female presents the emergency department complaining of migraine headache associate with nausea vomiting diarrhea and generalized abdominal pain symptoms started few days ago still present worsening reports subjective fevers.        History provided by:  Patient  Vomiting  Severity:  Moderate  Duration:  3 days  Associated symptoms: abdominal pain, diarrhea, fever and headaches    Associated symptoms: no cough        Review of Systems   Constitutional:  Positive for fever.   HENT:  Negative for congestion.    Respiratory:  Negative for cough and shortness of breath.    Cardiovascular:  Negative for chest pain.   Gastrointestinal:  Positive for abdominal pain, diarrhea, nausea and vomiting.   Neurological:  Positive for headaches. Negative for speech difficulty, weakness and numbness.   All other systems reviewed and are negative.          Objective       ED Triage Vitals [12/09/24 2109]   Temperature Pulse Blood Pressure Respirations SpO2 Patient Position - Orthostatic  VS   (!) 97.3 °F (36.3 °C) 83 115/72 18 97 % Sitting      Temp Source Heart Rate Source BP Location FiO2 (%) Pain Score    Temporal Monitor Left arm -- 7      Vitals      Date and Time Temp Pulse SpO2 Resp BP Pain Score FACES Pain Rating User   12/09/24 2150 -- 68 97 % 16 100/55 -- -- KK   12/09/24 2130 -- 77 98 % 18 117/65 -- -- KK   12/09/24 2125 -- -- -- -- -- 7 --    12/09/24 2125 -- 78 97 % 15 116/71 -- -- KK   12/09/24 2119 -- -- -- -- -- 7 --    12/09/24 2113 -- -- -- -- -- 7 --    12/09/24 2109 97.3 °F (36.3 °C) 83 97 % 18 115/72 7 -- KK            Physical Exam  Vitals and nursing note reviewed.   Constitutional:       General: She is not in acute distress.     Appearance: Normal appearance.   HENT:      Head: Normocephalic and atraumatic.      Nose: Nose normal.   Eyes:      Extraocular Movements: Extraocular movements intact.      Conjunctiva/sclera: Conjunctivae normal.      Pupils: Pupils are equal, round, and reactive to light.   Cardiovascular:      Rate and Rhythm: Normal rate and regular rhythm.   Pulmonary:      Effort: Pulmonary effort is normal. No respiratory distress.      Breath sounds: Normal breath sounds.   Abdominal:      Palpations: Abdomen is soft.      Tenderness: There is no abdominal tenderness.   Skin:     General: Skin is dry.   Neurological:      General: No focal deficit present.      Mental Status: She is alert and oriented to person, place, and time.         Results Reviewed       Procedure Component Value Units Date/Time    Comprehensive metabolic panel [[id number]]  (Abnormal) Collected: 12/09/24 2123    Lab Status: Final result Specimen: Blood from Arm, Right Updated: 12/09/24 2152     Sodium 138 mmol/L      Potassium 3.2 mmol/L      Chloride 106 mmol/L      CO2 25 mmol/L      ANION GAP 7 mmol/L      BUN 8 mg/dL      Creatinine 0.58 mg/dL      Glucose 99 mg/dL      Calcium 8.9 mg/dL      AST 11 U/L      ALT 10 U/L      Alkaline Phosphatase 45 U/L      Total Protein 7.1  g/dL      Albumin 4.0 g/dL      Total Bilirubin 0.37 mg/dL      eGFR 117 ml/min/1.73sq m     Narrative:      National Kid[employer] Foundation guidelines for Chronic Kidney Disease (CKD):     Stage 1 with normal or high GFR (GFR > 90 mL/min/1.73 square meters)    Stage 2 Mild CKD (GFR = 60-89 mL/min/1.73 square meters)    Stage 3A Moderate CKD (GFR = 45-59 mL/min/1.73 square meters)    Stage 3B Moderate CKD (GFR = 30-44 mL/min/1.73 square meters)    Stage 4 Severe CKD (GFR = 15-29 mL/min/1.73 square meters)    Stage 5 End Stage CKD (GFR <15 mL/min/1.73 square meters)  Note: GFR calculation is accurate only with a steady state creatinine    Lipase [[id number]]  (Normal) Collected: 12/09/24 2123    Lab Status: Final result Specimen: Blood from Arm, Right Updated: 12/09/24 2152     Lipase 26 u/L     Lactic acid, plasma (w/reflex if result > 2.0) [[id number]]  (Normal) Collected: 12/09/24 2123    Lab Status: Final result Specimen: Blood from Arm, Right Updated: 12/09/24 2152     LACTIC ACID 1.3 mmol/L     Narrative:      Result may be elevated if tourniquet was used during collection.    Magnesium [[id number]]  (Abnormal) Collected: 12/09/24 2123    Lab Status: Final result Specimen: Blood from Arm, Right Updated: 12/09/24 2152     Magnesium 1.8 mg/dL     Protime-INR [[id number]]  (Normal) Collected: 12/09/24 2123    Lab Status: Final result Specimen: Blood from Arm, Right Updated: 12/09/24 2147     Protime 13.0 seconds      INR 0.94    Narrative:      INR Therapeutic Range    Indication                                             INR Range      Atrial Fibrillation                                               2.0-3.0  Hypercoagulable State                                    2.0.2.3  Left Ventricular Asist Device                            2.0-3.0  Mechanical Heart Valve                                  -    Aortic(with afib, MI, embolism, HF, LA enlargement,    and/or coagulopathy)                                      2.0-3.0 (2.5-3.5)     Mitral                                                             2.5-3.5  Prosthetic/Bioprosthetic Heart Valve               2.0-3.0  Venous thromboembolism (VTE: VT, PE        2.0-3.0    APTT [[id number]]  (Normal) Collected: 12/09/24 2123    Lab Status: Final result Specimen: Blood from Arm, Right Updated: 12/09/24 2147     PTT 29 seconds     CBC and differential [[id number]] Collected: 12/09/24 2123    Lab Status: Final result Specimen: Blood from Arm, Right Updated: 12/09/24 2134     WBC 9.76 Thousand/uL      RBC 4.52 Million/uL      Hemoglobin 13.3 g/dL      Hematocrit 39.8 %      MCV 88 fL      MCH 29.4 pg      MCHC 33.4 g/dL      RDW 13.2 %      MPV 9.9 fL      Platelets 255 Thousands/uL      nRBC 0 /100 WBCs      Segmented % 50 %      Immature Grans % 1 %      Lymphocytes % 39 %      Monocytes % 6 %      Eosinophils Relative 4 %      Basophils Relative 0 %      Absolute Neutrophils 4.94 Thousands/µL      Absolute Immature Grans 0.05 Thousand/uL      Absolute Lymphocytes 3.78 Thousands/µL      Absolute Monocytes 0.60 Thousand/µL      Eosinophils Absolute 0.35 Thousand/µL      Basophils Absolute 0.04 Thousands/µL             No orders to display       Procedures    ED Medication and Procedure Management   Prior to Admission Medications   Prescriptions Last Dose Informant Patient Reported? Taking?   ARIPiprazole (ABILIFY) 10 mg tablet   Yes No   Sig: Take 10 mg by mouth every morning   SUMAtriptan (Imitrex) 25 mg tablet   No No   Sig: Take 1 tablet (25 mg total) by mouth once as needed for migraine   Patient not taking: Reported on 7/11/2024   albuterol (2.5 mg/3 mL) 0.083 % nebulizer solution   No No   Sig: Take 3 mL (2.5 mg total) by nebulization every 6 (six) hours as needed for wheezing or shortness of breath   albuterol (Ventolin HFA) 90 mcg/act inhaler   No No   Sig: Inhale 2 puffs every 4 (four) hours as needed for wheezing or shortness of breath   buPROPion (Wellbutrin SR) 150 mg  12 hr tablet   No No   Sig: Take 1 tablet (150 mg total) by mouth 2 (two) times a day   Patient not taking: Reported on 7/11/2024   chlorhexidine (PERIDEX) 0.12 % solution   No No   Sig: Apply 15 mL to the mouth or throat 2 (two) times a day   Patient not taking: Reported on 7/11/2024   clonazePAM (KlonoPIN) 0.5 mg tablet   Yes No   Sig: Take 0.5 mg by mouth daily   naproxen (NAPROSYN) 500 mg tablet   No No   Sig: Take 1 tablet (500 mg total) by mouth 2 (two) times a day with meals for 7 days   omeprazole (PriLOSEC) 20 mg delayed release capsule   Yes No   Sig: TAKE 1 CAPSULE ORALLY IN THE MORNING 1 HOUR BEFORE 1ST MEAL OF THE DAY   Patient not taking: Reported on 7/11/2024   ondansetron (ZOFRAN) 8 mg tablet   No No   Sig: Take 1 tablet (8 mg total) by mouth every 8 (eight) hours as needed for nausea or vomiting   ondansetron (ZOFRAN-ODT) 4 mg disintegrating tablet   No No   Sig: Take 1 tablet (4 mg total) by mouth every 6 (six) hours as needed for nausea or vomiting   Patient not taking: Reported on 7/11/2024   pantoprazole (PROTONIX) 40 mg tablet   No No   Sig: Take 1 tablet (40 mg total) by mouth daily On an empty stomach with water - 30 minutes prior to eating or drinking anything else   propranolol (INDERAL LA) 80 mg 24 hr capsule   No No   Sig: Take 1 capsule (80 mg total) by mouth daily   Patient not taking: Reported on 7/11/2024   sucralfate (CARAFATE) 1 g tablet   No No   Sig: Take 1 tablet (1 g total) by mouth 4 (four) times a day as needed (Abdominal pain) for up to 3 days   traZODone (DESYREL) 50 mg tablet   Yes No   varenicline (CHANTIX) 0.5 mg tablet   Yes No   Sig: take 1 tablet by mouth daily for 1-3 DAYS then 1 tablet twice a day for DAYS 4-7   Patient not taking: Reported on 7/11/2024      Facility-Administered Medications: None     Patient's Medications   Discharge Prescriptions    ONDANSETRON (ZOFRAN-ODT) 4 MG DISINTEGRATING TABLET    Take 1 tablet (4 mg total) by mouth every 6 (six) hours as  needed for nausea or vomiting       Start Date: 12/9/2024 End Date: --       Order Dose: 4 mg       Quantity: 12 tablet    Refills: 0     No discharge procedures on file.  ED SEPSIS DOCUMENTATION   Time reflects when diagnosis was documented in both MDM as applicable and the Disposition within this note       Time User Action Codes Description Comment    12/9/2024  9:57 PM Williams Younger [G43.909] Migraine headache     12/9/2024  9:57 PM Williams Younger [R11.2,  R19.7] Nausea vomiting and diarrhea     12/9/2024  9:57 PM Williams Younger [B34.9] Viral syndrome     12/9/2024  9:57 PM Williams Younger [E83.42] Hypomagnesemia     12/9/2024  9:57 PM Williams Younger [E87.6] Hypokalemia                  Williams Younger DO  12/09/24 2200

## 2025-04-30 ENCOUNTER — OFFICE VISIT (OUTPATIENT)
Dept: URGENT CARE | Facility: CLINIC | Age: 40
End: 2025-04-30
Payer: COMMERCIAL

## 2025-04-30 VITALS
OXYGEN SATURATION: 98 % | TEMPERATURE: 97.9 F | DIASTOLIC BLOOD PRESSURE: 82 MMHG | WEIGHT: 241.4 LBS | SYSTOLIC BLOOD PRESSURE: 110 MMHG | RESPIRATION RATE: 18 BRPM | HEIGHT: 65 IN | HEART RATE: 66 BPM | BODY MASS INDEX: 40.22 KG/M2

## 2025-04-30 DIAGNOSIS — J20.9 ACUTE BRONCHITIS, UNSPECIFIED ORGANISM: Primary | ICD-10-CM

## 2025-04-30 DIAGNOSIS — J45.901 ASTHMA WITH ACUTE EXACERBATION, UNSPECIFIED ASTHMA SEVERITY, UNSPECIFIED WHETHER PERSISTENT: ICD-10-CM

## 2025-04-30 PROCEDURE — 99213 OFFICE O/P EST LOW 20 MIN: CPT

## 2025-04-30 RX ORDER — ALBUTEROL SULFATE 90 UG/1
2 INHALANT RESPIRATORY (INHALATION) EVERY 6 HOURS PRN
Qty: 8.5 G | Refills: 0 | Status: SHIPPED | OUTPATIENT
Start: 2025-04-30

## 2025-04-30 RX ORDER — METHYLPREDNISOLONE 4 MG/1
TABLET ORAL
Qty: 21 EACH | Refills: 0 | Status: SHIPPED | OUTPATIENT
Start: 2025-04-30

## 2025-04-30 RX ORDER — DEXTROMETHORPHAN HYDROBROMIDE AND PROMETHAZINE HYDROCHLORIDE 15; 6.25 MG/5ML; MG/5ML
5 SYRUP ORAL 4 TIMES DAILY PRN
Qty: 180 ML | Refills: 0 | Status: SHIPPED | OUTPATIENT
Start: 2025-04-30

## 2025-04-30 NOTE — PROGRESS NOTES
[facility]        NAME: Genevieve Go is a 39 y.o. female  : 1985    MRN: [id number]  DATE: 2025  TIME: 3:41 PM    Assessment and Plan   Acute bronchitis, unspecified organism [J20.9]  1. Acute bronchitis, unspecified organism  methylPREDNISolone 4 MG tablet therapy pack    albuterol (ProAir HFA) 90 mcg/act inhaler    promethazine-dextromethorphan (PHENERGAN-DM) 6.25-15 mg/5 mL oral syrup      2. Asthma with acute exacerbation, unspecified asthma severity, unspecified whether persistent  methylPREDNISolone 4 MG tablet therapy pack    albuterol (ProAir HFA) 90 mcg/act inhaler            Patient Instructions     Take Phenergan as needed for cough  Use inhaler for shortness of breath, chest tightness or wheezing  Take course of steroids as prescribed. Be sure to take with food! Recommended to take in the morning, as taking this medication later in the day may cause sleep disturbance (keeping you awake). If diabetic, be sure to monitor your blood glucose levels while on this medication and notify PCP if levels become too elevated.  Avoid taking ibuprofen containing products while on steroid therapy.    Follow up with PCP in 3-5 days.  Proceed to  ER if symptoms worsen.    If tests are performed, our office will contact you with results only if changes need to made to the care plan discussed with you at the visit. You can review your full results on [facility].    Chief Complaint     Chief Complaint   Patient presents with    Cough     Pt c/o cough x2 days that is making her vomit . Needs work note          History of Present Illness       Cough  This is a new problem. Episode onset: 2 days. The cough is Productive of sputum. Pertinent negatives include no chest pain, chills, ear pain, fever, postnasal drip, rhinorrhea, sore throat, shortness of breath or wheezing. Her past medical history is significant for asthma.       Review of Systems   Review of Systems   Constitutional:   Negative for chills, diaphoresis, fatigue and fever.   HENT:  Negative for congestion, ear pain, postnasal drip, rhinorrhea, sinus pressure, sore throat and trouble swallowing.    Respiratory:  Positive for cough. Negative for chest tightness, shortness of breath and wheezing.    Cardiovascular:  Negative for chest pain and palpitations.   Gastrointestinal:  Positive for vomiting (due to coughing).   Skin:  Negative for color change.   Neurological:  Negative for dizziness and light-headedness.   Psychiatric/Behavioral:  Negative for sleep disturbance.          Current Medications       Current Outpatient Medications:     albuterol (ProAir HFA) 90 mcg/act inhaler, Inhale 2 puffs every 6 (six) hours as needed for wheezing or shortness of breath, Disp: 8.5 g, Rfl: 0    methylPREDNISolone 4 MG tablet therapy pack, Use as directed on package, Disp: 21 each, Rfl: 0    promethazine-dextromethorphan (PHENERGAN-DM) 6.25-15 mg/5 mL oral syrup, Take 5 mL by mouth 4 (four) times a day as needed for cough, Disp: 180 mL, Rfl: 0    albuterol (2.5 mg/3 mL) 0.083 % nebulizer solution, Take 3 mL (2.5 mg total) by nebulization every 6 (six) hours as needed for wheezing or shortness of breath (Patient not taking: Reported on 4/30/2025), Disp: 3 mL, Rfl: 1    albuterol (Ventolin HFA) 90 mcg/act inhaler, Inhale 2 puffs every 4 (four) hours as needed for wheezing or shortness of breath (Patient not taking: Reported on 4/30/2025), Disp: 18 g, Rfl: 5    ARIPiprazole (ABILIFY) 10 mg tablet, Take 10 mg by mouth every morning (Patient not taking: Reported on 4/30/2025), Disp: , Rfl:     buPROPion (Wellbutrin SR) 150 mg 12 hr tablet, Take 1 tablet (150 mg total) by mouth 2 (two) times a day (Patient not taking: Reported on 4/30/2025), Disp: 180 tablet, Rfl: 1    chlorhexidine (PERIDEX) 0.12 % solution, Apply 15 mL to the mouth or throat 2 (two) times a day (Patient not taking: Reported on 4/30/2025), Disp: 120 mL, Rfl: 0    clonazePAM  (KlonoPIN) 0.5 mg tablet, Take 0.5 mg by mouth daily (Patient not taking: Reported on 4/30/2025), Disp: , Rfl:     naproxen (NAPROSYN) 500 mg tablet, Take 1 tablet (500 mg total) by mouth 2 (two) times a day with meals for 7 days, Disp: 14 tablet, Rfl: 0    omeprazole (PriLOSEC) 20 mg delayed release capsule, TAKE 1 CAPSULE ORALLY IN THE MORNING 1 HOUR BEFORE 1ST MEAL OF THE DAY (Patient not taking: Reported on 4/30/2025), Disp: , Rfl:     ondansetron (ZOFRAN) 8 mg tablet, Take 1 tablet (8 mg total) by mouth every 8 (eight) hours as needed for nausea or vomiting (Patient not taking: Reported on 4/30/2025), Disp: 20 tablet, Rfl: 0    ondansetron (ZOFRAN-ODT) 4 mg disintegrating tablet, Take 1 tablet (4 mg total) by mouth every 6 (six) hours as needed for nausea or vomiting (Patient not taking: Reported on 4/30/2025), Disp: 15 tablet, Rfl: 0    ondansetron (ZOFRAN-ODT) 4 mg disintegrating tablet, Take 1 tablet (4 mg total) by mouth every 6 (six) hours as needed for nausea or vomiting (Patient not taking: Reported on 4/30/2025), Disp: 12 tablet, Rfl: 0    pantoprazole (PROTONIX) 40 mg tablet, Take 1 tablet (40 mg total) by mouth daily On an empty stomach with water - 30 minutes prior to eating or drinking anything else, Disp: 56 tablet, Rfl: 0    propranolol (INDERAL LA) 80 mg 24 hr capsule, Take 1 capsule (80 mg total) by mouth daily (Patient not taking: Reported on 4/30/2025), Disp: 30 capsule, Rfl: 1    sucralfate (CARAFATE) 1 g tablet, Take 1 tablet (1 g total) by mouth 4 (four) times a day as needed (Abdominal pain) for up to 3 days, Disp: 12 tablet, Rfl: 0    SUMAtriptan (Imitrex) 25 mg tablet, Take 1 tablet (25 mg total) by mouth once as needed for migraine (Patient not taking: Reported on 4/30/2025), Disp: 10 tablet, Rfl: 0    traZODone (DESYREL) 50 mg tablet, , Disp: , Rfl:     varenicline (CHANTIX) 0.5 mg tablet, take 1 tablet by mouth daily for 1-3 DAYS then 1 tablet twice a day for DAYS 4-7 (Patient not  "taking: Reported on 4/30/2025), Disp: , Rfl:     Current Allergies     Allergies as of 04/30/2025 - Reviewed 04/30/2025   Allergen Reaction Noted    Medical tape Rash 08/19/2013            The following portions of the patient's history were reviewed and updated as appropriate: allergies, current medications, past family history, past medical history, past social history, past surgical history and problem list.     Past Medical History:   Diagnosis Date    Anxiety     Asthma     Bipolar 1 disorder (HCC)     Depression     GERD (gastroesophageal reflux disease)     Hypercholesterolemia     Migraines        Past Surgical History:   Procedure Laterality Date    ADENOIDECTOMY      CHOLECYSTECTOMY      FL INJECTION RIGHT HIP (ARTHROGRAM)  11/23/2020    FL INJECTION RIGHT HIP (NON ARTHROGRAM)  08/10/2020    TONSILLECTOMY      TUBAL LIGATION         Family History   Problem Relation Age of Onset    Mental illness Mother     Depression Mother     Cancer Mother     Hyperlipidemia Mother     Asthma Father     Arthritis Father     Cancer Brother     Psoriasis Daughter          Medications have been verified.        Objective   /82   Pulse 66   Temp 97.9 °F (36.6 °C)   Resp 18   Ht 5' 5\" (1.651 m)   Wt 109 kg (241 lb 6.4 oz)   SpO2 98%   BMI 40.17 kg/m²        Physical Exam     Physical Exam  Constitutional:       General: She is not in acute distress.     Appearance: Normal appearance. She is not ill-appearing.   HENT:      Head: Normocephalic.      Right Ear: Tympanic membrane and external ear normal.      Left Ear: Tympanic membrane and external ear normal.      Nose: No congestion.      Mouth/Throat:      Mouth: Mucous membranes are moist.      Pharynx: Oropharynx is clear.   Cardiovascular:      Rate and Rhythm: Normal rate and regular rhythm.      Pulses: Normal pulses.      Heart sounds: Normal heart sounds.   Pulmonary:      Effort: Pulmonary effort is normal. No respiratory distress.      Breath sounds: " Normal breath sounds. No stridor. No wheezing, rhonchi or rales.   Lymphadenopathy:      Cervical: No cervical adenopathy.   Skin:     General: Skin is warm and dry.   Neurological:      General: No focal deficit present.      Mental Status: She is alert and oriented to person, place, and time. Mental status is at baseline.   Psychiatric:         Mood and Affect: Mood normal.         Behavior: Behavior normal.         Thought Content: Thought content normal.         Judgment: Judgment normal.

## 2025-04-30 NOTE — PATIENT INSTRUCTIONS
Take Phenergan as needed for cough  Use inhaler for shortness of breath, chest tightness or wheezing  Take course of steroids as prescribed. Be sure to take with food! Recommended to take in the morning, as taking this medication later in the day may cause sleep disturbance (keeping you awake). If diabetic, be sure to monitor your blood glucose levels while on this medication and notify PCP if levels become too elevated.  Avoid taking ibuprofen containing products while on steroid therapy.    Follow up with PCP in 3-5 days.  Proceed to  ER if symptoms worsen.    If tests are performed, our office will contact you with results only if changes need to made to the care plan discussed with you at the visit. You can review your full results on [facility].

## 2025-04-30 NOTE — LETTER
April 30, 2025     Patient: Genevieve Go   YOB: 1985   Date of Visit: 4/30/2025       To Whom It May Concern:    It is my medical opinion that Genevieve Go may return to work on 5/1/2025 .    If you have any questions or concerns, please don't hesitate to call.         Sincerely,        Ivan Chapman PA-C    CC: No Recipients